# Patient Record
Sex: FEMALE | Race: WHITE
[De-identification: names, ages, dates, MRNs, and addresses within clinical notes are randomized per-mention and may not be internally consistent; named-entity substitution may affect disease eponyms.]

---

## 2018-11-30 ENCOUNTER — HOSPITAL ENCOUNTER (EMERGENCY)
Dept: HOSPITAL 97 - ER | Age: 38
Discharge: HOME | End: 2018-11-30
Payer: COMMERCIAL

## 2018-11-30 DIAGNOSIS — I10: ICD-10-CM

## 2018-11-30 DIAGNOSIS — S33.5XXA: Primary | ICD-10-CM

## 2018-11-30 LAB — UA COMPLETE W REFLEX CULTURE PNL UR: (no result)

## 2018-11-30 PROCEDURE — 74176 CT ABD & PELVIS W/O CONTRAST: CPT

## 2018-11-30 PROCEDURE — 81003 URINALYSIS AUTO W/O SCOPE: CPT

## 2018-11-30 PROCEDURE — 81015 MICROSCOPIC EXAM OF URINE: CPT

## 2018-11-30 PROCEDURE — 99284 EMERGENCY DEPT VISIT MOD MDM: CPT

## 2018-11-30 PROCEDURE — 76377 3D RENDER W/INTRP POSTPROCES: CPT

## 2018-11-30 PROCEDURE — 96372 THER/PROPH/DIAG INJ SC/IM: CPT

## 2018-11-30 NOTE — EDPHYS
Physician Documentation                                                                           

 [facility]                                                                

Name: Marci Reyes                                                                                 

Age: 38 yrs                                                                                       

Sex: Female                                                                                       

: 1980                                                                                   

MRN: [id number]                                                                                   

Arrival Date: 2018                                                                          

Time: 01:38                                                                                       

Account#: [id number]                                                                            

Bed 16                                                                                            

Private MD: Agus Perez                                                                        

ED Physician Clinton Tolentino                                                                       

HPI:                                                                                              

                                                                                             

03:19 This 38 yrs old  Female presents to ER via Ambulatory with complaints of Back  gs  

      Pain.                                                                                       

03:19 The patient presents with pain that is acute. The symptoms are located in the low back. gs  

      Onset: The symptoms/episode began/occurred 2 day(s) ago, and became persistent. The         

      pain radiates to the right quadriceps. Associated signs and symptoms: Pertinent             

      negatives: dysuria, incontinence, numbness, urinary retention. Modifying factors: the       

      patient symptoms are aggravated by any movement, bending. Severity of symptoms: At          

      their worst the symptoms were moderate, in the emergency department the symptoms are        

      unchanged.                                                                                  

                                                                                                  

OB/GYN:                                                                                           

01:45 Patient said her LMP was 2 years ago                                                    cc3 

                                                                                                  

Historical:                                                                                       

- Allergies:                                                                                      

:45 No Known Allergies;                                                                     cc3 

- PMHx:                                                                                           

03:21 Hypertension;                                                                           gs  

- PSHx:                                                                                           

01:45 ;                                                                              cc3 

                                                                                                  

- Immunization history:: Adult Immunizations not up to date.                                      

- Social history:: Smoking status: Patient/guardian denies using tobacco, never smoked.           

- Ebola Screening: : No symptoms or risks identified at this time.                                

                                                                                                  

                                                                                                  

ROS:                                                                                              

03:19 All other systems are negative.                                                         gs  

                                                                                                  

Exam:                                                                                             

03:19 Head/Face:  Normocephalic, atraumatic. Eyes:  Pupils equal round and reactive to light, gs  

      extra-ocular motions intact.  Lids and lashes normal.  Conjunctiva and sclera are           

      non-icteric and not injected.  Cornea within normal limits.  Periorbital areas with no      

      swelling, redness, or edema. ENT:  Nares patent. No nasal discharge, no septal              

      abnormalities noted.  Tympanic membranes are normal and external auditory canals are        

      clear.  Oropharynx with no redness, swelling, or masses, exudates, or evidence of           

      obstruction, uvula midline.  Mucous membranes moist. Neck:  Trachea midline, no             

      thyromegaly or masses palpated, and no cervical lymphadenopathy.  Supple, full range of     

      motion without nuchal rigidity, or vertebral point tenderness.  No Meningismus.             

      Chest/axilla:  Normal chest wall appearance and motion.  Nontender with no deformity.       

      No lesions are appreciated. Cardiovascular:  Regular rate and rhythm with a normal S1       

      and S2.  No gallops, murmurs, or rubs.  Normal PMI, no JVD.  No pulse deficits.             

      Respiratory:  Lungs have equal breath sounds bilaterally, clear to auscultation and         

      percussion.  No rales, rhonchi or wheezes noted.  No increased work of breathing, no        

      retractions or nasal flaring. Abdomen/GI:  Soft, non-tender, with normal bowel sounds.      

      No distension or tympany.  No guarding or rebound.  No evidence of tenderness               

      throughout. Skin:  Warm, dry with normal turgor.  Normal color with no rashes, no           

      lesions, and no evidence of cellulitis. MS/ Extremity:  Pulses equal, no cyanosis.          

      Neurovascular intact.  Full, normal range of motion. Neuro:  Awake and alert, GCS 15,       

      oriented to person, place, time, and situation.  Cranial nerves II-XII grossly intact.      

      Motor strength 5/5 in all extremities.  Sensory grossly intact.  Cerebellar exam            

      normal.  Normal gait.                                                                       

03:19 Constitutional: The patient appears alert, awake.                                           

03:19 Back: pain, that is moderate, of the  right low back.                                       

                                                                                                  

Vital Signs:                                                                                      

01:45  / 107; Pulse 94; Resp 20 S; Temp 98.7(O); Pulse Ox 98% on R/A; Weight 83.91 kg   cc3 

      (R); Height 5 ft. 1 in. (154.94 cm) (R); Pain 10/10;                                        

02:12  / 97; Pulse 93; Resp 19 S; Pulse Ox 99% on R/A;                                  cc3 

03:20  / 83; Pulse 89; Resp 18 S; Pulse Ox 98% on R/A;                                  cc3 

01:45 Body Mass Index 34.96 (83.91 kg, 154.94 cm)                                             cc3 

                                                                                                  

MDM:                                                                                              

01:55 Patient medically screened.                                                               

03:19 Differential diagnosis: Pyelonephritis ruptured disc, sprain, Ureterolithiasis. Data    gs  

      reviewed: vital signs, nurses notes. Counseling: I had a detailed discussion with the       

      patient and/or guardian regarding: the historical points, exam findings, and any            

      diagnostic results supporting the discharge/admit diagnosis, lab results, radiology         

      results, the need for outpatient follow up. Response to treatment: the patient's            

      symptoms have markedly improved after treatment, and as a result, I will discharge          

      patient.                                                                                    

                                                                                                  

                                                                                             

01:56 Order name: Urine Microscopic Only; Complete Time: 02:38                                  

                                                                                             

02:33 Order name: Urine Dipstick--Ancillary (enter results); Complete Time: 02:38             ar5 

                                                                                             

01:56 Order name: Urine Pregnancy Test (obtain specimen); Complete Time: 02:21                  

                                                                                             

01:56 Order name: CT Stone Protocol                                                             

                                                                                             

01:56 Order name: Urine Dipstick-Ancillary (obtain specimen); Complete Time: 02:21              

                                                                                                  

Administered Medications:                                                                         

02:05 Drug: Norco 10 mg-325 mg 1 tabs Route: PO;                                              cc3 

02:30 Follow up: Response: No adverse reaction; Pain is decreased                             cc3 

02:10 Drug: TORadol 30 mg Route: IM; Site: left gluteus;                                      cc3 

02:30 Follow up: Response: No adverse reaction; Pain is decreased                             cc3 

                                                                                                  

                                                                                                  

Disposition:                                                                                      

18 03:22 Discharged to Home. Impression: Sprain of ligaments of lumbar spine.               

- Condition is Stable.                                                                            

- Discharge Instructions: Back Exercises, Easy-to-Read.                                           

- Prescriptions for Prednisone 20 mg Oral Tablet - take 1 tablet by ORAL route once               

  daily for 5 days; 5 tablet. Tylenol- Codeine #4 300-60 mg Oral Tablet - take 1 tablet           

  by ORAL route every 6 hours As needed; 12 tablet.                                               

- Medication Reconciliation Form, Thank You Letter, Antibiotic Education, Prescription            

  Opioid Use form.                                                                                

- Follow up: Private Physician; When: 2 - 3 days; Reason: Re-evaluation by your                   

  physician.                                                                                      

                                                                                                  

                                                                                                  

                                                                                                  

Signatures:                                                                                       

Dispatcher [facility]                                                 

Clinton Tolentino MD MD                                                      

Melisa Hameed                             3                                                  

                                                                                                  

Corrections: (The following items were deleted from the chart)                                    

03:21 01:45 PMHx: None; cc3                                                                     

03:37 03:22 2018 03:22 Discharged to Home. Impression: Sprain of ligaments of lumbar    cc3 

      spine. Condition is Stable. Forms are Medication Reconciliation Form, Thank You Letter,     

      Antibiotic Education, Prescription Opioid Use. Follow up: Private Physician; When: 2 -      

      3 days; Reason: Re-evaluation by your physician.                                          

                                                                                                  

**************************************************************************************************

## 2018-11-30 NOTE — RAD REPORT
EXAM DESCRIPTION:  CT - Stone Protocol - 11/30/2018 5:41 am

 

CLINICAL HISTORY:  Abdominal pain. Lower abdominal pain.

 

COMPARISON:  None.

 

TECHNIQUE:  Computed axial tomography of the abdomen pelvis was obtained without oral or IV contrast.
 Lack of IV and oral contrast limits evaluation of solid organs, bowel, and vessels. Coronal reformat
ciarra images were obtained and reviewed.  Preliminary report generated by virtual radiologic a review p
rior to dictation

 

All CT scans are performed using dose optimization technique as appropriate and may include automated
 exposure control or mA/KV adjustment according to patient size.

 

FINDINGS:  A renal calculus is not seen. An ureteral calculus is not noted. A bladder calculus is not
 present.

 

The liver, spleen, pancreas and adrenals appear grossly normal

 

There is no evidence of diverticulitis. The appendix appears normal

 

A 4.5 centimeter left adnexal cystic mass without significant free-fluid.

 

An IUD is in good position

 

A left breast implant is only partially included in the field of view. It may be ruptured and should 
be correlated clinically

 

IMPRESSION:  Negative for a genitourinary calculus

 

4.5 centimeter left adnexal cystic mass without significant free-fluid. 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
radha cyst. It is recommended patient follow up ultrasound in a couple months for re-evaluation

## 2018-11-30 NOTE — ER
Nurse's Notes                                                                                     

 [facility]                                                                

Name: Marci Reyes                                                                                 

Age: 38 yrs                                                                                       

Sex: Female                                                                                       

: 1980                                                                                   

MRN: [id number]                                                                                   

Arrival Date: 2018                                                                          

Time: 01:38                                                                                       

Account#: [id number]                                                                            

Bed 16                                                                                            

Private MD: Agus Perez                                                                        

Diagnosis: Sprain of ligaments of lumbar spine                                                    

                                                                                                  

Presentation:                                                                                     

                                                                                             

01:45 Presenting complaint: Patient states: Low back pain radiating to bilateral hips since   cc3 

      yesterday. Transition of care: patient was not received from another setting of care.       

      Onset of symptoms was 2018. Risk Assessment: Do you want to hurt yourself      

      or someone else? Patient reports no desire to harm self or others. Initial Sepsis           

      Screen: Does the patient meet any 2 criteria? No. Patient's initial sepsis screen is        

      negative. Does the patient have a suspected source of infection? No. Patient's initial      

      sepsis screen is negative. Care prior to arrival: None.                                     

01:45 Method Of Arrival: Ambulatory                                                           cc3 

01:45 Acuity: GINGER 3                                                                           cc3 

                                                                                                  

Triage Assessment:                                                                                

01:45 General: Appears distressed, uncomfortable, Behavior is calm, cooperative, appropriate  cc3 

      for age. Pain: Complains of pain in low back, bilateral hips Pain currently is 10 out       

      of 10 on a pain scale. Quality of pain is described as aching. EENT: No signs and/or        

      symptoms were reported regarding the EENT system. Neuro: Level of Consciousness is          

      awake, alert, obeys commands, Oriented to person, place, time, situation, Appropriate       

      for age. Cardiovascular: Denies chest pain. Respiratory: Airway is patent Respiratory       

      effort is even, unlabored, Respiratory pattern is regular, symmetrical. GI: Abdomen is      

      round non-distended. : No signs and/or symptoms were reported regarding the               

      genitourinary system. Derm: No signs and/or symptoms reported regarding the                 

      dermatologic system. Musculoskeletal: Circulation, motion, and sensation intact. Range      

      of motion: intact in all extremities.                                                       

                                                                                                  

OB/GYN:                                                                                           

01:45 Patient said her LMP was 2 years ago                                                    cc3 

                                                                                                  

Historical:                                                                                       

- Allergies:                                                                                      

:45 No Known Allergies;                                                                     cc3 

- PMHx:                                                                                           

03:21 Hypertension;                                                                           gs  

- PSHx:                                                                                           

01:45 ;                                                                              cc3 

                                                                                                  

- Immunization history:: Adult Immunizations not up to date.                                      

- Social history:: Smoking status: Patient/guardian denies using tobacco, never smoked.           

- Ebola Screening: : No symptoms or risks identified at this time.                                

                                                                                                  

                                                                                                  

Screenin:45 Abuse screen: Denies threats or abuse. Denies injuries from another. Nutritional        cc3 

      screening: No deficits noted. Tuberculosis screening: No symptoms or risk factors           

      identified. Fall Risk Ambulatory Aid- None/Bed Rest/Nurse Assist (0 pts). Gait-             

      Normal/Bed Rest/Wheelchair (0 pts) Mental Status- Oriented to own ability (0 pts).          

                                                                                                  

Assessment:                                                                                       

01:45 General: see triage assessment.                                                         cc3 

02:42 Reassessment: Patient appears in no apparent distress at this time. Patient and/or      cc3 

      family updated on plan of care and expected duration. Pain level reassessed. Patient is     

      alert, oriented x 3, equal unlabored respirations, skin warm/dry/pink. Patient came         

      back from CT scan department.                                                               

03:30 Reassessment: Patient appears in no apparent distress at this time. Patient and/or      cc3 

      family updated on plan of care and expected duration. Pain level reassessed. Patient is     

      alert, oriented x 3, equal unlabored respirations, skin warm/dry/pink. Dr. Tolentino            

      discharged the patient home with prescription given. No IV cannula in situ. Patient         

      left ER vitally stable and ambulatory with her .                                     

                                                                                                  

Vital Signs:                                                                                      

01:45  / 107; Pulse 94; Resp 20 S; Temp 98.7(O); Pulse Ox 98% on R/A; Weight 83.91 kg   cc3 

      (R); Height 5 ft. 1 in. (154.94 cm) (R); Pain 10/10;                                        

02:12  / 97; Pulse 93; Resp 19 S; Pulse Ox 99% on R/A;                                  cc3 

03:20  / 83; Pulse 89; Resp 18 S; Pulse Ox 98% on R/A;                                  cc3 

01:45 Body Mass Index 34.96 (83.91 kg, 154.94 cm)                                             3 

                                                                                                  

ED Course:                                                                                        

01:38 Patient arrived in ED.                                                                  am2 

01:39 Agus Perez DO is Private Physician.                                                am2 

01:45 Arm band placed on right wrist.                                                         cc3 

01:45 Patient has correct armband on for positive identification. Bed in low position. Call   cc3 

      light in reach. Side rails up X 1. Pulse ox on. NIBP on.                                    

01:49 Clinton Tolentino MD is Attending Physician.                                                

01:50 Melisa Hameed is Primary Nurse.                                                      cc3 

01:55 Triage completed.                                                                       cc3 

02:49 CT Stone Protocol In Process Unspecified.                                               EDMS

03:02 CT completed. Patient tolerated procedure well. Patient moved to CT via wheelchair.       

      Patient moved back from CT.                                                                 

03:30 No provider procedures requiring assistance completed. Patient did not have IV access   cc3 

      during this emergency room visit.                                                           

                                                                                                  

Administered Medications:                                                                         

02:05 Drug: Norco 10 mg-325 mg 1 tabs Route: PO;                                              cc3 

02:30 Follow up: Response: No adverse reaction; Pain is decreased                             cc3 

02:10 Drug: TORadol 30 mg Route: IM; Site: left gluteus;                                      cc3 

02:30 Follow up: Response: No adverse reaction; Pain is decreased                             cc3 

                                                                                                  

                                                                                                  

Outcome:                                                                                          

03:22 Discharge ordered by MD.                                                                gs  

03:30 Discharged to home ambulatory, with family.                                             cc3 

03:30 Condition: stable                                                                           

03:30 Discharge instructions given to patient, family, Instructed on discharge instructions,      

      follow up and referral plans. medication usage, Demonstrated understanding of               

      instructions, follow-up care, medications, Prescriptions given X 2.                         

03:37 Patient left the ED.                                                                    cc3 

                                                                                                  

Signatures:                                                                                       

Dispatcher [employer]                           EDMS                                                 

Jairo Durand                                                                                   

Gabriela Luna                               Clinton Guerra MD MD                                                      

Melisa Hameed                             cc3                                                  

                                                                                                  

Corrections: (The following items were deleted from the chart)                                    

03:21 01:45 PMHx: None; cc3                                                                     

04:49 02:42 Reassessment: Patient came back from CT scan department. cc3                      cc3 

                                                                                                  

**************************************************************************************************

## 2024-08-04 NOTE — ER
Nurse's Notes                                                                                     

 [facility]                                                                 

Name: Marci Reyes                                                                                 

Age: 44 yrs                                                                                       

Sex: Female                                                                                       

: 1980                                                                                   

MRN: [id number]                                                                                   

Arrival Date: 2024                                                                          

Time: 10:42                                                                                       

Account#: [id number]                                                                            

Bed 12                                                                                            

Private MD:                                                                                       

Diagnosis: Dental Abscess                                                                         

                                                                                                  

Presentation:                                                                                     

                                                                                             

10:49 Chief complaint: Patient states: "I had some tooth fillings that fell out a while back  aa5 

      and never got fixed and Saturday I woke up with some swelling to my face". pt c/o left      

      cheek swelling. Coronavirus screen: At this time, the client does not indicate any          

      symptoms associated with coronavirus-19. Ebola Screen: Patient denies travel to an          

      Ebola-affected area in the 21 days before illness onset. Initial Sepsis Screen: Does        

      the patient meet any 2 criteria? No. Patient's initial sepsis screen is negative. Does      

      the patient have a suspected source of infection? No. Patient's initial sepsis screen       

      is negative. Risk Assessment: Do you want to hurt yourself or someone else? Patient         

      reports no desire to harm self or others. Onset of symptoms was 2024.                

10:49 Method Of Arrival: Ambulatory                                                           aa5 

10:49 Acuity: GINGRE 4                                                                           aa5 

                                                                                                  

OB/GYN:                                                                                           

11:31 LMP N/A - Birth control method, Not pregnant                                            ll1 

                                                                                                  

Historical:                                                                                       

- Allergies:                                                                                      

10:50 No Known Allergies;                                                                     aa5 

- PMHx:                                                                                           

10:50 Hypertension;                                                                           aa5 

- PSHx:                                                                                           

10:50 hysterectomy;  section;                                                         aa5 

                                                                                                  

- Immunization history:: Adult Immunizations unknown.                                             

- Infectious Disease History:: Denies.                                                            

- Social history:: Smoking status: Patient denies any tobacco usage or history of.                

                                                                                                  

                                                                                                  

Screenin:07 [facility] ED Fall Risk Assessment (Adult) History of falling in the last 3 months,       ll1 

      including since admission No falls in past 3 months (0 pts) Confusion or Disorientation     

      No (0 pts) Intoxicated or Sedated No (0 pts) Impaired Gait No (0 pts) Mobility Assist       

      Device Used No (0 pt) Altered Elimination No (0 pt) Score/Fall Risk Level 0 - 2 = Low       

      Risk Maintained a safe environment, Hourly rounding (assess needs \T\ fall precautionary    

      measures) done. Abuse screen: Denies threats or abuse. Nutritional screening: No            

      deficits noted. Tuberculosis screening: No symptoms or risk factors identified.             

                                                                                                  

Assessment:                                                                                       

11:06 General: Appears uncomfortable, Behavior is calm, cooperative, appropriate for age.     ll1 

      Pain: Complains of pain in L jaw/tooth pain Pain currently is 10 out of 10 on a pain        

      scale. Quality of pain is described as aching, throbbing. EENT: swelling present L side     

      of jaw. Reports pain in left cheek and left jaw.                                            

11:30 Reassessment: No changes from previously documented assessment. Patient and/or family   ll1 

      updated on plan of care and expected duration. Pain level reassessed. Patient is alert,     

      oriented x 3, equal unlabored respirations, skin warm/dry/pink.                             

                                                                                                  

Vital Signs:                                                                                      

10:49  / 100; Pulse 110; Resp 18 S; Temp 97.8(TE); Pulse Ox 98% on R/A; Weight 90.72 kg aa5 

      (R); Height 5 ft. 1 in. (R);                                                                

11:30  / 114; Pulse 97; Resp 17; Pulse Ox 98% ; Pain 7/10;                              ll1 

10:49 Body Mass Index 37.79 (90.72 kg, 154.94 cm)                                             aa5 

11:30 Pain Scale: Adult                                                                       ll1 

11:30 Dr. De La Torre informed of BP. Still cleared for discharge                                  ll1 

                                                                                                  

ED Course:                                                                                        

10:44 Patient arrived in ED.                                                                  im  

10:46 Akin De La Torre MD is Attending Physician.                                               ec2 

10:49 Arm band placed on.                                                                     aa5 

10:50 Triage completed.                                                                       aa5 

11:07 Patient has correct armband on for positive identification. Bed in low position.        ll1 

      Provided Education on: ER procedures and process. Cardiac monitoring not applicable on      

      this patient.                                                                               

11:07 No provider procedures requiring assistance completed.                                  ll1 

11:08 Celine Pal, RN is Primary Nurse.                                                     ll1 

11:32 Patient did not have IV access during this emergency room visit.                        ll1 

                                                                                                  

Administered Medications:                                                                         

11:05 Drug: Amoxicillin-Clavulanate  mg PO once Route: PO;                              ll1 

11:30 Follow up: Response: No adverse reaction                                                1 

11:05 Drug: Ketorolac IM 15 mg IM once Route: IM; Site: right deltoid;                        ll1 

11:30 Follow up: Response: No adverse reaction; Pain is decreased                             1 

11:06 Drug: HYDROcodone-acetaminophen PO 5 mg-325 mg 1 tabs PO once {Note: pain 10/10 RASS 0, 1 

      S.O. driving home.} Route: PO;                                                              

11:30 Follow up: Response: No adverse reaction; Pain is decreased; RASS: Alert and Calm (0)   [facility] 

11:30 Drug: Lidocaine Infiltration (1 %) 20 ml 20 ml Infiltration once; to bedside {Note: by  1 

      Dr. eD La Torre.} Volume: 20 ml; Route: Infiltration;                                            

11:30 Follow up: Response: No adverse reaction; Pain is decreased                             [facility] 

                                                                                                  

                                                                                                  

Medication:                                                                                       

11:07 VIS not applicable for this client.                                                     [facility] 

                                                                                                  

Outcome:                                                                                          

11:21 Discharge ordered by MD.                                                                ec2 

11:31 Discharged to home ambulatory,                                                          [facility] 

11:31 Condition: stable                                                                           

11:31 Discharge instructions given to patient, Instructed on discharge instructions, follow       

      up and referral plans. no drinking with medication, no driving heavy equipment,             

      medication usage, Demonstrated understanding of instructions, follow-up care,               

      medications, Prescriptions given X 2,                                                       

11:32 Patient left the ED.                                                                    [facility] 

                                                                                                  

Signatures:                                                                                       

Maira Garza RN RN   aa5                                                  

Celine Pal RN                       RN   1                                                  

Yuki Wiley Edwin, MD MD   ec2                                                  

                                                                                                  

Corrections: (The following items were deleted from the chart)                                    

10:51 10:50 Allergies: Aspirin; cristopher nicholas 

                                                                                                  

**************************************************************************************************

## 2024-08-04 NOTE — EDPHYS
Physician Documentation                                                                           

 [facility]                                                                 

Name: Marci Reyes                                                                                 

Age: 44 yrs                                                                                       

Sex: Female                                                                                       

: 1980                                                                                   

MRN: [id number]                                                                                   

Arrival Date: 2024                                                                          

Time: 10:42                                                                                       

Account#: [id number]                                                                            

Bed 12                                                                                            

Private MD:                                                                                       

Akin Contreras                                                                        

HPI:                                                                                              

                                                                                             

10:56 This 44 yrs old  Female presents to ER via Ambulatory with complaints of       ec2 

      Toothache, Facial Swelling.                                                                 

10:56 Patient arrives today for left upper dental pain as well as overlying facial swelling.  ec2 

      Reports no fevers or chills, no nausea or vomiting. Denies any history of p.o. intake..     

                                                                                                  

OB/GYN:                                                                                           

11:31 LMP N/A - Birth control method, Not pregnant                                            ll1 

                                                                                                  

Historical:                                                                                       

- Allergies:                                                                                      

10:50 No Known Allergies;                                                                     aa5 

- PMHx:                                                                                           

10:50 Hypertension;                                                                           aa5 

- PSHx:                                                                                           

10:50 hysterectomy;  section;                                                         aa5 

                                                                                                  

- Immunization history:: Adult Immunizations unknown.                                             

- Infectious Disease History:: Denies.                                                            

- Social history:: Smoking status: Patient denies any tobacco usage or history of.                

                                                                                                  

                                                                                                  

ROS:                                                                                              

10:56 Constitutional: as per hpi                                                              ec2 

                                                                                                  

Exam:                                                                                             

10:56 Constitutional:  GEN: NAD                                                                   

Head: atraumatic                                                                                  

Eyes: EOMI                                                                                        

Ears: External ears are normal.  ec2                                                              

      Mouth: Dental caries noted in the left upper teeth, overlying facial swelling, scant.       

      No erythema, no warmth, no fluctuance appreciated.  No submandibular swelling or            

      lymphadenopathy present.  No trismus                                                        

CV: regular rate                                                                                  

LUNGS: no respiratory                                                                             

      distress                                                                                    

ABD: non-distended                                                                                

SKIN: no evidence of rashes                                                                       

MSK: no evidence of                                                                               

      trauma                                                                                      

NEURO: moves all extremities equally                                                              

                                                                                                  

Vital Signs:                                                                                      

10:49  / 100; Pulse 110; Resp 18 S; Temp 97.8(TE); Pulse Ox 98% on R/A; Weight 90.72 kg aa5 

      (R); Height 5 ft. 1 in. (R);                                                                

11:30  / 114; Pulse 97; Resp 17; Pulse Ox 98% ; Pain 7/10;                              ll1 

10:49 Body Mass Index 37.79 (90.72 kg, 154.94 cm)                                             aa5 

11:30 Pain Scale: Adult                                                                       ll1 

11:30 Dr. De La Torre informed of BP. Still cleared for discharge                                  ll1 

                                                                                                  

Procedures:                                                                                       

11:22 Nerve block: (dental) of periapical block, Medication: Lidocaine 1% without epinephrine ec2 

      Amount: 8 mls were injected, Effect: the patient's symptoms are improved, markedly, Set     

      up for procedure. Performed by Akin De La Torre MD Patient tolerated well.                      

                                                                                                  

MDM:                                                                                              

10:47 Patient medically screened.                                                             ec2 

10:56 Data reviewed: vital signs. ED course: Patient arrives today for dental pain and facial ec2 

      swelling. Examination remarkable for well-appearing nontoxic vigorous otherwise in no       

      acute distress with slight tachycardia noted and facial findings as above. Will perform     

      dental block, instructed patient antibiotics and have patient follow-up with dentistry.     

      Presentation consistent with dental abscess.                                                

                                                                                                  

Administered Medications:                                                                         

11:05 Drug: Amoxicillin-Clavulanate  mg PO once Route: PO;                              ll1 

11:30 Follow up: Response: No adverse reaction                                                ll1 

11:05 Drug: Ketorolac IM 15 mg IM once Route: IM; Site: right deltoid;                        ll1 

11:30 Follow up: Response: No adverse reaction; Pain is decreased                             ll1 

11:06 Drug: HYDROcodone-acetaminophen PO 5 mg-325 mg 1 tabs PO once {Note: pain 10/10 RASS 0, ll1 

      S.O. driving home.} Route: PO;                                                              

11:30 Follow up: Response: No adverse reaction; Pain is decreased; RASS: Alert and Calm (0)   ll1 

11:30 Drug: Lidocaine Infiltration (1 %) 20 ml 20 ml Infiltration once; to bedside {Note: by  ll1 

      Dr. De La Torre.} Volume: 20 ml; Route: Infiltration;                                            

11:30 Follow up: Response: No adverse reaction; Pain is decreased                             ll1 

                                                                                                  

                                                                                                  

Disposition Summary:                                                                              

24 11:21                                                                                    

Discharge Ordered                                                                                 

 Notes:       Location: Home                                                                        
  ec2

      Condition: Stable                                                                       ec2 

      Diagnosis                                                                                   

        - Dental Abscess                                                                      ec2 

      Followup:                                                                               ec2 

        - With: Private Physician                                                                  

        - When:                                                                                    

        - Reason: Re-evaluation by your physician                                                  

      Discharge Instructions:                                                                     

        - Discharge Summary Sheet                                                             ec2 

      Forms:                                                                                      

        - Work release form                                                                   ll1 

        - Medication Reconciliation Form                                                      ec2 

        - Antibiotic Education                                                                ec2 

        - Prescription Opioid Use                                                             ec2 

        - Patient Portal Instructions                                                         ec2 

        - Leadership Thank You Letter                                                         ec2 

      Prescriptions:                                                                              

        - acetaminophen-codeine 300-30 mg Oral tablet                                              

            - take 1 tablet ORAL route every 4 hours; 15 tablet; Refills: 0, Product          ec2 

      Selection Permitted                                                                         

        - Augmentin 875-125 mg Oral tablet                                                         

            - take 1 tablet ORAL route every 12 hours for 7 days; 14 tablet; Refills: 0,      ec2 

      Product Selection Permitted                                                                 

Signatures:                                                                                       

Maira Garza RN                     RN   aa5                                                  

Celine Pal RN                       RN   ll1                                                  

Akin De La Torre MD MD   ec2                                                  

                                                                                                  

Corrections: (The following items were deleted from the chart)                                    

10:51 10:50 Allergies: Aspirin; aa5                                                           aa5 

                                                                                                  

**************************************************************************************************

## 2024-08-04 NOTE — XMS REPORT
Continuity of Care Document



                           Created on: 2024





REYES, MARCI REJI

External Reference #: [id number]

: 1980

Sex: Female



Demographics





                                        Address             [address] [place] [address]  [address]

 

                                        Home Phone          [phone]

 

                                        Work Phone          [phone]

 

                                        Email Address       [email or web address]

 

                                        Preferred Language  English

 

                                        Marital Status      Unknown

 

                                        Sikhism Affiliation Unknown

 

                                        Race                Unknown

 

                                        Additional Race(s)  Unavailable

 

                                        Ethnic Group        Unknown





Author





                                        Name                Unknown

 

                                        Address             [address] [facility]

[place]  [address]

 

                                        hospitals

[facility]

 

                                        Address             [address] [facility] [address]

[address]

[place]  [address]

 

                                        Phone               [phone]





Support





                          Name         Relationship Address      Phone

 

                                REYES, ELPIDIO J SP              [address] [place]  [address]                 [phone]

 

                                REYES, ELPIDIO J SP              [address] [place]  [address]                 [phone]

 

                                REYES, ELPIDIO J Personal Relationship [address] [place]  [address]                 [phone]





Care Team Providers





                                Care Team Member Name Role            Phone

 

                                Rochelle Quinn Attending Clinician Unavailable

 

                                French Andrea Attending Clinician Unavailable

 

                                Abril Wood  Attending Clinician Unavailable

 

                                Elizabet Hearn Attending Clinician UnavailFrench Carnes Admitting Clinician Unavailable

 

                                Abril Wood  Admitting Clinician Unavailable

 

                                Agus Perez Admitting Clinician Unavailable







Payers





                    Payer Name Policy Type Policy Number Effective Date Expirati

on Date Source







Allergies, Adverse Reactions, Alerts





                                                    Allergy 

Name                                    Allergy 

Type            Status          Severity        Reaction(s)     Onset 

Date                                    Inactive 

Date                                    Treating 

Clinician                 Comments                  Source

 

                                                    No Known 

Allergie

s            DA           Active       U                         2024-0

3- 

00:00:

00                                                              [facility]

 

                                                    No Known 

Allergie

s            DA           Active       U                         - 

00:00:

00                                                              [facility]

 

                                                    No Known 

Allergie

s            DA           Active       U                         - 

00:00:

00                                                              [facility]







Procedures





                          Procedure    Date / Time Performed Performing Clinicia

n Source

 

                          [id number]      2020 00:00:00 [facility]

 

                          [id number]      2020 00:00:00 [facility]







Encounters





                                                    Start 

Date/Time                               End 

Date/Time                               Encounter 

Type                                    Admission 

Type                                    Attending 

Clinicians                              Care 

Facility                                Care 

Department                              Encounter 

ID                                      Source

 

                                                    2023 

16:07:00                        Outpatient                      Rochelle Quinn             [facility]              [id number]

[id number]                                   [facility] 

- [facility]

 

                                                    2020 

07:15:00                        Inpatient       French Boyd               [facility]               [place]                  [id number]

2002                                  [facility]

l [facility]

 

                                                    2020 

13:30:00                        Inpatient       French Boyd               [facility]               [place]                  [id number]

                                  [facility]

[facility] 

[place]

 

                                                    2020 

13:07:00                        Inpatient                       Lisa Andreanna               [facility]               ROBERTO CARLOS                [id number]

2001                                  [facility]

l [facility]

 

                                                    2024 

08:28:00                                2024 

10:38:00            Inpatient           Abril Nath              [facility]               MEDI.01             [id number]

74                                      [facility] 

[facility]

[facility]

 

                                                    2023-10-23 

10:44:00                                2023-10-23 

10:44:00            Outpatient          Elizabet Alexis           [facility]               RADI                [id number]

19                                      [facility]

 

                                                    2020 

11:00:00                                2020 

11:00:00            Outpatient                              French Andrea               [facility]               RADI                [id number]

2001                                  [facility] 

[facility]

[facility]







Results





                    Test Description Test Time Test Comments Results   Result Co

mments Source







                                        

 

                                        

 

                                        





CBC W/AUTO [id number] 05:33:00* 



                      Test Item  Value      Reference Range Interpretation Comme

nts

 

                      WHITE BLOOD CELL (test code = WBC) 13.4 K/mm3 6.5-12.3   H

          

 

                      RED BLOOD CELL (test code = RBC) 3.72 M/mm3 3.51-4.69  N  

        

 

                      HEMOGLOBIN (test code = HGB) 9.9 g/dL   10.1-13.8  L      

    

 

                      HEMATOCRIT (test code = HCT) 31.5 %     32.5-41.8  L      

    

 

                      MEAN CELL VOLUME (test code = MCV) 84.7 fL    84.6-96.6  N

          

 

                      MEAN CELL HGB (test code = MCH) 26.6 pg    27.3-33.9  L   

       

 

                                                    MEAN CELL HGB CONCETRATION (

test 

code = MCHC)    31.4 gm/dL      32.0-34.2       L               

 

                                                    RED CELL DISTRIBUTION WIDTH 

(test 

code = RDW)     13.3 %          12.2-16.3       N               

 

                      PLATELET COUNT (test code = PLT) 333 K/mm3  134-363    N  

        

 

                                                    MEAN PLATELET VOLUME (test c

ode = 

MPV)            10.1 fL         9.2-12.7        N               

 

                      NEUTROPHIL % (test code = NT%) 78.2 %     57.9-77.3  H    

      

 

                      LYMPHOCYTE % (test code = LY%) 14.4 %     14.5-29.7  L    

      

 

                      MONOCYTE % (test code = MO%) 6.9 %      3.6-10.2   N      

    

 

                      EOSINOPHIL % (test code = EO%) 0.0 %      0.0-3.0    N    

      

 

                      BASOPHIL % (test code = BA%) 0.3 %      0.1-0.9    N      

    

 

                      NEUTROPHIL # (test code = NT#) 10.5 K/mm3                 

      

 

                      LYMPHOCYTE # (test code = LY#) 1.9 K/mm3                  

      

 

                      MONOCYTE # (test code = MO#) 0.9 K/mm3                    

    

 

                      EOSINOPHIL # (test code = EO#) 0 K/mm3                    

      

 

                      BASOPHIL # (test code = BA#) 0.0 K/mm3                    

    





AG HEPATITIS B SURFACE2024-03-15 17:34:00* 



                      Test Item  Value      Reference Range Interpretation Comme

nts

 

                                                    AG HEPATITIS B SURFACE (test

 code 

= HBSAG)        NONREACTIVE     NONREACTIVE                     





AB HEPATITIS C PROFILE2024-03-15 17:34:00* 



                      Test Item  Value      Reference Range Interpretation Comme

nts

 

                                                    AB HEPATITIS C (test code = 

HCVAB)          NONREACTIVE     NONREACTIVE                     

 

                                                    SIGNAL TO CUTOFF (test code 

= 

CUTOFF)         0.20            <0.80           N               





AB HIV 1 22024-03-15 17:34:00* 



                      Test Item  Value      Reference Range Interpretation Comme

nts

 

                                                    AB HIV 1 2 (test 

code = [id number]) NONREACTIVE     NONREACTIVE                     Done by [employer] 

[employer] 

4th Gen HIV Ag/Ab Combo 

Screen





BASIC METABOLIC PANEL2024-03-15 17:32:00* 



                      Test Item  Value      Reference Range Interpretation Comme

nts

 

                                                    SODIUM (test code = 

NA)             142 mEq/L       135-145         N               

 

                                                    POTASSIUM (test code 

= K)            3.7 mEq/L       3.5-5.0         N               

 

                                                    CHLORIDE (test code 

= CL)           106 mEq/L       100-115         N               

 

                                                    CARBON DIOXIDE (test 

code = CO2)     23 mEq/L        22-31           N               

 

                                                    ANION GAP (test code 

= GAP)          17.10           10-20           N               

 

                                                    GLUCOSE (test code = 

GLU)            135 mg/dL                 H               

 

                                                    BLOOD UREA NITROGEN 

(test code = BUN) 9 mg/dL         7-18            N               

 

                                                    CREATININE (test 

code = CREAT)   0.7 mg/dL       0.5-1.0         N               

 

                                                    CALCIUM (test code = 

CA)             8.6 mg/dL       8.4-10.2        N               

 

                                                    GLOMERULAR 

FILTRATION RATE 

(test code = GFR) 111 ml/min      >60             N               The Glomerular

 

Filtration Rate is a 

calculated 

parameterbased on serum 

Creatinine, patient age 

and sex. GFR valuesless 

than 60 mL/min/1.73 

square meters are 

indicative ofChronic 

Kidney Disease. Values 

less than 15 

mL/min/1.73square 

meters indicate Kidney 

failure. The 

calculation forGFR is 

based on the CKD-EPI 

(202) calculation. 

This formulais race 

indifferent and is the 

recommended formula for 

GFRby the National 

Kidney Foundation for 

Adults.The GFR will not 

calculate if the sex is 

unknown or if 

thepatient's age is <18 

years.





HCG SERUM QUAL2024-03-15 17:12:00* 



                      Test Item  Value      Reference Range Interpretation Comme

nts

 

                      HCG SERUM QUAL (test code = HCGQL) NEGATIVE               

          





COVID 19 Asymptomatic IH AG2024-03-15 16:25:00* 



                      Test Item  Value      Reference Range Interpretation Comme

nts

 

                                                    COVID 19 

Asymptomatic IH AG 

(test code = 

COVNONPUIAG)    NEGATIVE        NEGATIVE                        This test has be

en 

authorized only for the 

detection ofproteins from 

SARS-CoV-2, not for any 

other viruses 

orpathogens. Negative 

results should be treated 

as presumptive 

andconfirmed with a 

molecular assay, if 

necessary for 

patientmanagement. 

Negative results do not 

rule out COVID-19 

andshould not be used as 

the sole basis for 

treatment orpatient 

management decisions, 

including infection 

controldecisions. 

Negative results should 

be considered in 

thecontext of a patient's 

recent exposures, history 

and thepresence of 

clinical signs and 

symptoms consistent 

withCOVID-19. This test 

has not been FDA cleared 

or approved; the test 

hasbeen authorized by FDA 

under an Emergency Use 

Authorization(EUA) for 

use by laboratories 

certified under the CLIA 

thatmeet the requirements 

to perform moderate, high 

or waivedcomplexity 

tests. This test is 

authorized for use at 

thePoint of Care (POC), 

i.e., in patient care 

settingsoperating under a 

CLIA Certificate of 

Waiver, Certificate 

ofCompliance, or 

Certificate of 

Accreditation. This test 

is only authorized for 

the duration of 

thedeclaration that 

circumstances exist 

justifying 

theauthorization of 

emergency use of in vitro 

diagnostic testsfor 

detection and/or 

diagnosis of COVID-19 

under [place](b)(1) of 

the Act, 21 U.S.C. 

360bbb-3(b)(1), unless 

theauthorization is 

terminated or revoked 

sooner.





URINALYSIS COMPLETE2024-03-15 16:14:00* 



                      Test Item  Value      Reference Range Interpretation Comme

nts

 

                      UA COLOR (test code = COLU) YELLOW     YELLOW             

   

 

                                                    UA APPEARANCE (test code = 

APPU)           Slightly-Cloudy CLEAR                           

 

                                                    UA GLUCOSE DIPSTICK (test 

code = DGLUU)   NEGATIVE        NEG                             

 

                                                    UA BILIRUBIN DIPSTICK (test 

code = BILU)    NEGATIVE        NEG                             

 

                                                    UA KETONE DIPSTICK (test cod

e 

= KETU)         NEGATIVE        NEG                             

 

                                                    UA SPECIFIC GRAVITY (test 

code = SGU)     1.021           1.001-1.035     N               

 

                                                    UA BLOOD DIPSTICK (test code

 

= JANES)          NEG             NEG                             

 

                                                    UA PH DIPSTICK (test code = 

GABRIELA)            5.0             5-9                             

 

                                                    UA PROTEIN DIPSTICK (test 

code = PROU)    NEGATIVE        NEG                             

 

                                                    UA UROBILINIOGEN DIPSTICK 

(test code = URO) NEGATIVE mg/dL  NEG                             

 

                                                    UA NITRITE DIPSTICK (test 

code = MAGALY)    NEG             NEG                             

 

                                                    UA LEUKOCYTE ESTERASE 

DIPSTICK (test code = LEUU) NEG             NEG                             

 

                      UA WBC (test code = WBCU) 0-2 #/hpf  NONE SEEN            

 

 

                      UA RBC (test code = RBCU) 0-2 #/hpf  NONE SEEN            

 

 

                                                    UA EPITHELIAL CELLS (test 

code = EPIU)    RARE #/HPF      RARE-FEW                        

 

                      UA MUCUS (test code = MUCU) 2+         NONE SEEN  A       

   





URINE SAMPLE: CLEAN CATCHCBC W/AUTO DIFF2024-03-15 16:05:00* 



                      Test Item  Value      Reference Range Interpretation Comme

nts

 

                      WHITE BLOOD CELL (test code = WBC) 8.5 K/mm3  6.5-12.3   N

          

 

                      RED BLOOD CELL (test code = RBC) 4.10 M/mm3 3.51-4.69  N  

        

 

                      HEMOGLOBIN (test code = HGB) 11.2 g/dL  10.1-13.8  N      

    

 

                      HEMATOCRIT (test code = HCT) 35.3 %     32.5-41.8  N      

    

 

                      MEAN CELL VOLUME (test code = MCV) 86.1 fL    84.6-96.6  N

          

 

                      MEAN CELL HGB (test code = MCH) 27.3 pg    27.3-33.9  N   

       

 

                                                    MEAN CELL HGB CONCETRATION (

test 

code = MCHC)    31.7 gm/dL      32.0-34.2       L               

 

                                                    RED CELL DISTRIBUTION WIDTH 

(test 

code = RDW)     13.1 %          12.2-16.3       N               

 

                      PLATELET COUNT (test code = PLT) 364 K/mm3  134-363    H  

        

 

                                                    MEAN PLATELET VOLUME (test c

ode = 

MPV)            10.0 fL         9.2-12.7        N               

 

                      NEUTROPHIL % (test code = NT%) 66.1 %     57.9-77.3  N    

      

 

                      LYMPHOCYTE % (test code = LY%) 25.4 %     14.5-29.7  N    

      

 

                      MONOCYTE % (test code = MO%) 5.2 %      3.6-10.2   N      

    

 

                      EOSINOPHIL % (test code = EO%) 2.5 %      0.0-3.0    N    

      

 

                      BASOPHIL % (test code = BA%) 0.7 %      0.1-0.9    N      

    

 

                      NEUTROPHIL # (test code = NT#) 5.6 K/mm3                  

      

 

                      LYMPHOCYTE # (test code = LY#) 2.2 K/mm3                  

      

 

                      MONOCYTE # (test code = MO#) 0.4 K/mm3                    

    

 

                      EOSINOPHIL # (test code = EO#) 0.21 K/mm3                 

      

 

                      BASOPHIL # (test code = BA#) 0.1 K/mm3                    

    





- DUP AB/PEL/SC COMP2023-10-23 13:45:00
************************************************************[facility] 1980 Sex: 
F************************************************************ Name: REYES,MARCI 
[facility] : 1980 Age/S: 43 / F [id number] [facility] Unit #: 
[id number] Loc: [place] [address] Phys: Elizabet Hearn MD Acct: [id number] 
Dis Date:  Status: REG CLI PHONE #: [phone] Exam Date: 10/23/2023 1139 FAX 
#: Reason: EXCESSIVE MENSTRUATIONS  EXAMS: CPT: [id number] AB/PEL/SC COMP 
76631 CLINICAL INFORMATION: Menorrhagia. Irregular cycles. Dictation location: A
1 Comparison: No recent similar prior. Technique: Transabdominal and/or 
transvaginal study was done. Duplex color and spectral Doppler analysis of 
ovarian flow. FINDINGS: The uterus measures 12.4 x 6.2 x 7.2 cm. The endometrial
echo measured 1.3cm. The ovaries were normal in size and appearance with no 
adnexal mass or fluid identified. Normal blood flow was visualized by Doppler. 
Minimal free fluid was seen in the cul-de-sac. No significant cervical cyst 
formation. IMPRESSION: Slight thickeningof the endometrial echo could be due to 
the phase of menstruation. Otherwise no uterine or adnexal mass identified. ** 
Electronically Signed by FERNANDO Alvarado ** ** on 10/23/2023 at 1345 ** 
Reported and signed by: Christopher Alvarado M.D. CC: Agus Perez DO; Elizabet Hearn MD  Technologist: Dalila Hall Trnscb Date/Time: 10/23/2023 (1345) 
JoseAGV PAGE 1 Signed Report  Name: REYES,MARCI [facility] : 1980 
Age/S: 43 / F [address] [facility] Unit #: [id number]  Loc: [place] [address] 
Phys: Elizabet Hearn MD Acct: [id number] Dis Date: Status: REG CLI  PHONE #:
[phone] Exam Date: 10/23/2023 1139 FAX #: Reason: EXCESSIVE MENSTRUATIONS 
EXAMS:  CPT: [id number] DUP AB/PEL/SC COMP 74430 (Continued) Orig Print D/T: S: 
10/23/2023 (1348) Probe:  PAGE 2 Signed Report- US PELVIC COMPLETE2023-10-23 
13:45:00************************************************************[facility] : 1980 Sex: 
F************************************************************ Name: REYES,MARCI 
[facility]  : 1980 Age/S: 43 / F [address] [address] Unit #: 
[id number] Loc: [place] [address] Phys: Elizabet Hearn MD  Acct: [id number]
Dis Date: Status: REG CLI PHONE #: [phone] Exam Date: 10/23/2023 1139  FAX 
#: Reason: MENORRHAGIA WITH REGULAR CYCLE EXAMS: CPT: [id number] US PELVIC 
COMPLETE  [address] CLINICAL INFORMATION: Menorrhagia. Irregular cycles. Dictation 
location: A 1 Comparison: No recent similar prior.  Technique: Transabdominal 
and/or transvaginal study was done. Duplex color and spectral Doppler analysis 
of ovarian flow. FINDINGS: The uterus measures 12.4 x 6.2 x 7.2 cm. The 
endometrial echo measured 1.3cm. The ovaries were normal in size and appearance 
with no adnexal mass or fluid identified. Normal blood flow was visualized by 
Doppler. Minimal free fluid was seen in the cul-de-sac. No significant cervical 
cyst formation. IMPRESSION: Slight thickening of the endometrial echo could be 
due to the phase of menstruation. Otherwise no uterine or adnexal mass 
identified. ** Electronically Signed by FERNANDO Alvarado ** ** on 10/23/2023
at 1345 ** Reported and signed by: Christopher Alvarado M.D. CC: Agus Hearn MD  Technologist: Dalila Hall Trnscb Date/Time: 10/23/2023 
(6142) t.SDR.AGV  PAGE 1 Signed Report Name: REYES,MARCI [facility] : 
1980 Age/S: 43 / F [address] [facility] Unit #: [id number] Loc: [place]
[address] Phys: Elizabet Hearn MD  Acct: [id number] Dis Date: Status: REG CLI 
PHONE #: [phone] Exam Date: 10/23/2023 1139 FAX #: Reason: MENORRHAGIA WITH
REGULAR CYCLE EXAMS: CPT: [id number] US PELVIC COMPLETE [address]  (Continued) Orig 
Print D/T: S: 10/23/2023 (1980) Probe: PAGE 2  Signed Report- US TRANSVAGINAL 
NON OB2023-10-23 13:45:00
************************************************************[facility] : 1980 Sex: 
F************************************************************ Name: REYES,MARCI 
[facility] : 1980 Age/S: 43 / F [address] [facility] Unit #: 
[id number] Loc:  [place] [address] Phys: Elizabet Hearn MD Acct: [id number]
Dis Date: Status: REG CLI  PHONE #: [phone] Exam Date: 10/23/2023 1138 FAX 
#: Reason: MENORRHAGIA WITH REGULAR CYCLE EXAMS:  CPT: [id number] US TRANSVAGINAL
NON OB [address] CLINICAL INFORMATION: Menorrhagia. Irregular cycles. Dictation
location: A 1 Comparison: No recent similar prior. Technique: Transabdominal 
and/or transvaginal study was done. Duplex color and spectral Doppler analysis 
of ovarian flow. FINDINGS: The uterus measures 12.4 x 6.2 x 7.2 cm. The 
endometrial echo measured 1.3cm. The ovaries were normal in size and appearance 
with no adnexal mass or fluid identified. Normal blood flow was visualized by 
Doppler. Minimal free fluid was seen in the cul-de-sac. No significant cervical 
cyst formation.  IMPRESSION: Slight thickening of the endometrial echo could be 
due to the phase of menstruation. Otherwise no uterine or adnexal mass 
identified. ** Electronically Signed by FERNANDO Alvarado ** ** on 10/23/2023
at 1345 ** Reported and signed by: Christopher Alvarado M.D.  CC: Agus Perez DO; Elizabet Hearn MDTechnologist: Dalila Hall  Trnscb Date/Time: 10/23/2023
(2347) JoseAGV PAGE 1 Signed Report Name: REYES,MARCI  [facility] : 
1980 Age/S: 43 / F [id number] Unit #: [id number] Loc: [place]
[address]  Phys: Elizabet Hearn MD Acct: [id number] Dis Date: Status: REG CLI 
PHONE #: [phone] Exam Date: 10/23/2023 1131 FAX #: Reason: MENORRHAGIA WITH
REGULAR CYCLE EXAMS: CPT: [id number] [place] [address] (Continued) 
Orig Print D/T: S: 10/23/2023 (8811) Probe: [id number]  PAGE 2 Signed Report
FALLOPIAN TUBE,[id number] 15:04:00
--------------------------------------------------------------------------------

------------RUN DATE: 20  [facility] PAGE 1 RUN TIME:  
Specimen Inquiry RUN USER: INTERFACE  -------
--------------------------------------------------------------------------------

-----PATIENT: REYES,MARCI ACCT #: [id number] LOC: [facility] U #: [id number] 
AGE/SX: 39/F ROOM: [facility] RE20REG DR: French Andrea MD : [birth year] 
BED: A DIS:  STATUS: ADM IN TLOC: --------------------------------
------------------------------------------------------------ SPEC #: 
[id number] RECD:  STATUS: YASMANI CESPEDES #: [id number] STEFANIE: 20- 
SUBM DR: French Andrea MD ENTERED:  SP TYPE: FALLS OT DR: 
Bisi Ascencio MD ORDERED: LEVEL II SURGIC/2 CODES: H09019 - FALLOPIAN TUBE
COPIES TO: Bisi Ascencio MD [address] [place] [address] [phone] 
[email or web address] French Andrea MD [address] [place] 
[address] [phone] PROCEDURES: LEVEL II SURGIC (Incomplete) TISSUES: 
FALLOPIAN TUBE, NOS - BILATERAL FALLOPIAN TUBES CLINICAL HISTORY 39 year old, 
 @ 37.1 weeks, CHTN (kr) FINAL DIAGNOSIS Specimen #1 right fallopian tube, 
segmental resection: - histologic - complete lumen demonstrated Specimen #2 left
fallopian tube, brennen resection: - histologic  - complete lumen demonstrated
CPT code(s): 88302 x2 cds/wpd 20 ** CONTINUED ON NEXT PAGE ** 
--------------------------------------------------------------------------------

------------RUN DATE: 20 [facility] PAGE 2 RUN TIME:  
Specimen Inquiry RUN USER: INTERFACE 
--------------------------------------------------------------------------------

------------SPEC #: [id number] PATIENT: REYES,MARCI #[id number] 
(Continued)-------------------------------
------------------------------------------------------------- GROSS DESCRIPTION 
ANATOMIC SOURCE OF TISSUE (per Requisition): Fallopian right and left tube 
segments (two containers) Each specimen is labeled with the patient's name and 
medical record number. Specimen #1 is designated "right fallopiantube" and 
consists of a 1.0 cm in length and 0.4 cm in diameter pink-purple and hyperemic 
segment of fallopian tube. The lumen is pinpoint. Representative sections are 
submitted labeled A1. Specimen #2 is designated "left fallopian tube" and 
consists of a 0.8 cm in length and 0.6 cm in diameter pink-purple and hyperemic 
segment of fallopian tube. The lumen is pinpoint. Entirely submitted as B1. da
/denzel 20 MICROSCOPIC DESCRIPTION Cross-sections of fallopian tubes are 
histologic and demonstratecompletely transected lumens. errol/wpprincess 
20------------------------------------------------------
-------------------------------------- Signed ________________________________ 
Miky Ward 20 1504 
--------------------------------------------------------------------------------

------------  ** END OF REPORT **CBC W/AUTO [id number] 05:36:00* 



                      Test Item  Value      Reference Range Interpretation Comme

nts

 

                                                    WHITE BLOOD CELL (test 

code = WBC)     15.5 K/mm3      6.6-12.1        H               Results verified

 by 

repeat analysis

 

                                                    RED BLOOD CELL (test 

code = RBC)     4.01 M/mm3      3.45-5.01       N               

 

                                                    HEMOGLOBIN (test code = 

HGB)            11.6 g/dL       10.7-13.9       N               

 

                                                    HEMATOCRIT (test code = 

HCT)            35.9 %          32.1-42.1       N               

 

                                                    MEAN CELL VOLUME (test 

code = MCV)     90 fL           84.1-94.8       N               

 

                                                    MEAN CELL HGB (test code 

= MCH)          28.9 pg         27-35           N               

 

                                                    MEAN CELL HGB 

CONCETRATION (test code 

= MCHC)         32.3 gm/dL      32.2-34.1       N               

 

                                                    RED CELL DISTRIBUTION 

WIDTH (test code = RDW) 19.0 %          12.4-16.5       H               

 

                                                    PLATELET COUNT (test 

code = PLT)     199 K/mm3       133-385         N               

 

                                                    MEAN PLATELET VOLUME 

(test code = MPV) 11.0 fl         9.1-12.7        N               

 

                                                    NEUTROPHIL % (test code 

= NT%)          85.4 %          56.5-79.4       H               

 

                                                    LYMPHOCYTE % (test code 

= LY%)          8.2 %           14.3-34.3       L               

 

                                                    MONOCYTE % (test code = 

MO%)            5.6 %           5.1-10.4        N               

 

                                                    EOSINOPHIL % (test code 

= EO%)          0.1 %           0.1-3.0         N               

 

                                                    BASOPHIL % (test code = 

BA%)            0.2 %           0.1-1.0         N               

 

                                                    NEUTROPHIL # (test code 

= NT#)          13.2 K/mm3                                      

 

                                                    LYMPHOCYTE # (test code 

= LY#)          1.3 K/mm3                                       

 

                                                    MONOCYTE # (test code = 

MO#)            0.9 K/mm3                                       

 

                                                    EOSINOPHIL # (test code 

= EO#)          0.01 K/mm3                                      

 

                                                    BASOPHIL # (test code = 

BA#)            0.0 K/mm3                                       

 

                                                    RBC MORPHOLOGY REQUIRED 

(test code = RBCM) NORMAL          NORMAL                          

 

                                                    PLATELET MORPHOLOGY 

REQUIRED (test code = 

PLTMR)          NORMAL          NORMAL                          





AG HEPATITIS B [id number] 16:21:00* 



                      Test Item  Value      Reference Range Interpretation Comme

nts

 

                                                    AG HEPATITIS B SURFACE (test

 code 

= HBSAG)        NONREACTIVE     NONREACTIVE                     





IS CONSENT FORM SIGNED FOR HIV TESTING? YAB HEPATITIS C [id number] 
16:21:00* 



                      Test Item  Value      Reference Range Interpretation Comme

nts

 

                                                    AB HEPATITIS C (test code = 

HCVAB)          NONREACTIVE     NONREACTIVE                     

 

                                                    SIGNAL TO CUTOFF (test code 

= 

CUTOFF)         0.15            <0.80           N               





IS CONSENT FORM SIGNED FOR HIV TESTING? YAB [id number] 16:21:00* 



                      Test Item  Value      Reference Range Interpretation Comme

nts

 

                      AB TREPONEMA (test code = TREPAB) NONREACTIVE NONREACTIVE 

           





IS CONSENT FORM SIGNED FOR HIV TESTING? YAB HIV 1  16:21:00* 



                      Test Item  Value      Reference Range Interpretation Comme

nts

 

                                                    AB HIV 1 2 (test 

code = [id number]) NONREACTIVE     NONREACTIVE                     Done by [employer] 

[employer] 

4[employer] Gen HIV Ag/Ab Combo 

Screen





IS CONSENT FORM SIGNED FOR HIV TESTING? YAG HEPATITIS B [id number] 
15:52:00* 



                      Test Item  Value      Reference Range Interpretation Comme

nts

 

                                                    AG HEPATITIS B SURFACE (test

 code 

= HBSAG)        NONREACTIVE     NONREACTIVE                     





IS CONSENT FORM SIGNED FOR HIV TESTING? YAB HEPATITIS C [id number] 
15:52:00* 



                      Test Item  Value      Reference Range Interpretation Comme

nts

 

                      AB HEPATITIS C (test code = HCVAB)            NONREACTIVE 

           

 

                      SIGNAL TO CUTOFF (test code = CUTOFF)            <0.80    

             





IS CONSENT FORM SIGNED FOR HIV TESTING? CRISTIB [id number] 15:52:00* 



                      Test Item  Value      Reference Range Interpretation Comme

nts

 

                      AB TREPONEMA (test code = TREPAB) NONREACTIVE NONREACTIVE 

           





IS CONSENT FORM SIGNED FOR HIV TESTING? NOELLE HIV 1  15:52:00* 



                      Test Item  Value      Reference Range Interpretation Comme

nts

 

                      AB HIV 1 2 (test code = [id number])            NONREACTIVE   

         





IS CONSENT FORM SIGNED FOR HIV TESTING? YUR PROTEIN/CREATININE [id number] 
15:35:00* 



                      Test Item  Value      Reference Range Interpretation Comme

nts

 

                                                    UR PROTEIN RANDOM (test code

 = 

PROTU)          33.0 mg/dL                                      

 

                                                    UR CREATININE RANDOM (test 

code = CREATU)  78.8 mg/dL                                      

 

                                                    PROTEIN/CREATININE RATIO (te

st 

code = P/CRATIO) 410.0 mg/gcrea  <200            H               





URIC [id number] 15:02:00* 



                      Test Item  Value      Reference Range Interpretation Comme

nts

 

                      URIC ACID (test code = URIC) 4.7 mg/dL  2.6-6.0    N      

    





SGOT/[id number] 15:02:00* 



                      Test Item  Value      Reference Range Interpretation Comme

nts

 

                      SGOT/AST (test code = AST) 33 units/L 15-37               

  





[id number] 15:02:00* 



                      Test Item  Value      Reference Range Interpretation Comme

nts

 

                      SGPT/ALT (test code = ALT) 24 units/L 12-78      N        

  





LACTIC DEHYDROGENASE(LDH)2020 15:02:00* 



                      Test Item  Value      Reference Range Interpretation Comme

nts

 

                                                    LACTIC DEHYDROGENASE(LDH) (t

est 

code = LDH)     198 units/L               N               





URINALYSIS W/O [id number] 14:30:00* 



                      Test Item  Value      Reference Range Interpretation Comme

nts

 

                                                    UA GLUCOSE DIPSTICK (test co

de = 

DGLUU)          NEGATIVE        NEGATIVE                        

 

                                                    UA KETONE DIPSTICK (test cod

e = 

KETU)           1+              NEGATIVE                        

 

                                                    UA PROTEIN DIPSTICK (test co

de = 

PROU)           NEGATIVE        NEGATIVE                        





IS NURSE PERFORMING TEST? NCBC W/AUTO [id number] 14:28:00* 



                      Test Item  Value      Reference Range Interpretation Comme

nts

 

                      WHITE BLOOD CELL (test code = WBC) 8.1 K/mm3  6.6-12.1   N

          

 

                      RED BLOOD CELL (test code = RBC) 4.32 M/mm3 3.45-5.01  N  

        

 

                      HEMOGLOBIN (test code = HGB) 12.1 g/dL  10.7-13.9  N      

    

 

                      HEMATOCRIT (test code = HCT) 38.7 %     32.1-42.1  N      

    

 

                      MEAN CELL VOLUME (test code = MCV) 90 fL      84.1-94.8  N

          

 

                      MEAN CELL HGB (test code = MCH) 28.0 pg    27-35      N   

       

 

                                                    MEAN CELL HGB CONCETRATION (

test 

code = MCHC)    31.3 gm/dL      32.2-34.1       L               

 

                                                    RED CELL DISTRIBUTION WIDTH 

(test 

code = RDW)     19.4 %          12.4-16.5       H               

 

                      PLATELET COUNT (test code = PLT) 196 K/mm3  133-385    N  

        

 

                                                    MEAN PLATELET VOLUME (test c

ode = 

MPV)            11.5 fl         9.1-12.7        N               

 

                      NEUTROPHIL % (test code = NT%) 72.7 %     56.5-79.4  N    

      

 

                      LYMPHOCYTE % (test code = LY%) 18.1 %     14.3-34.3  N    

      

 

                      MONOCYTE % (test code = MO%) 7.1 %      5.1-10.4   N      

    

 

                      EOSINOPHIL % (test code = EO%) 1.0 %      0.1-3.0    N    

      

 

                      BASOPHIL % (test code = BA%) 0.4 %      0.1-1.0    N      

    

 

                      NEUTROPHIL # (test code = NT#) 5.9 K/mm3                  

      

 

                      LYMPHOCYTE # (test code = LY#) 1.5 K/mm3                  

      

 

                      MONOCYTE # (test code = MO#) 0.6 K/mm3                    

    

 

                      EOSINOPHIL # (test code = EO#) 0.08 K/mm3                 

      

 

                      BASOPHIL # (test code = BA#) 0.0 K/mm3                    

    

 

                                                    RBC MORPHOLOGY REQUIRED (mita

t code 

= RBCM)         NORMAL          NORMAL                          

 

                                                    PLATELET MORPHOLOGY REQUIRED

 (test 

code = PLTMR)   NORMAL          NORMAL                          





AG HEPATITIS B [id number] 15:57:00* 



                      Test Item  Value      Reference Range Interpretation Comme

nts

 

                                                    AG HEPATITIS B SURFACE (test

 code 

= HBSAG)        NONREACTIVE     NONREACTIVE                     





AB HEPATITIS C [id number] 15:57:00* 



                      Test Item  Value      Reference Range Interpretation Comme

nts

 

                                                    AB HEPATITIS C (test code = 

HCVAB)          NONREACTIVE     NONREACTIVE                     

 

                                                    SIGNAL TO CUTOFF (test code 

= 

CUTOFF)         0.15            <0.80           N               





AB [id number] 15:57:00* 



                      Test Item  Value      Reference Range Interpretation Comme

nts

 

                      AB TREPONEMA (test code = TREPAB) NONREACTIVE NONREACTIVE 

           





AG HEPATITIS B [id number] 15:22:00* 



                      Test Item  Value      Reference Range Interpretation Comme

nts

 

                                                    AG HEPATITIS B SURFACE (test

 code 

= HBSAG)        NONREACTIVE     NONREACTIVE                     





AB HEPATITIS C [id number] 15:22:00* 



                      Test Item  Value      Reference Range Interpretation Comme

nts

 

                      AB HEPATITIS C (test code = HCVAB)            NONREACTIVE 

           

 

                      SIGNAL TO CUTOFF (test code = CUTOFF)            <0.80    

             





AB [id number] 15:22:00* 



                      Test Item  Value      Reference Range Interpretation Comme

nts

 

                      AB TREPONEMA (test code = TREPAB) NONREACTIVE NONREACTIVE 

           





BLOOD UREA [id number] 14:50:00* 



                      Test Item  Value      Reference Range Interpretation Comme

nts

 

                      BLOOD UREA NITROGEN (test code = BUN) 9 mg/dL    7-18     

  N          





[id number] 14:50:00* 



                      Test Item  Value      Reference Range Interpretation Comme

nts

 

                      CREATININE (test code = CREAT) 0.6 mg/dL  0.5-1.0    N    

      





URIC [id number] 14:50:00* 



                      Test Item  Value      Reference Range Interpretation Comme

nts

 

                      URIC ACID (test code = URIC) 4.7 mg/dL  2.6-6.0    N      

    





[id number] 14:50:00* 



                      Test Item  Value      Reference Range Interpretation Comme

nts

 

                      SGOT/AST (test code = AST) 21 units/L 15-37      N        

  





[id number] 14:50:00* 



                      Test Item  Value      Reference Range Interpretation Comme

nts

 

                      SGPT/ALT (test code = ALT) 21 units/L 12-78      N        

  





LACTIC DEHYDROGENASE(LDH)2020 14:50:00* 



                      Test Item  Value      Reference Range Interpretation Comme

nts

 

                                                    LACTIC DEHYDROGENASE(LDH) (t

est 

code = LDH)     176 units/L               N               





URINALYSIS [id number] 14:45:00* 



                      Test Item  Value      Reference Range Interpretation Comme

nts

 

                      UA COLOR (test code = COLU) YELLOW     YELLOW             

   

 

                                                    UA APPEARANCE (test code = 

APPU)           Slightly-Cloudy CLEAR                           

 

                                                    UA GLUCOSE DIPSTICK (test 

code = DGLUU)   NEGATIVE        NEG                             

 

                                                    UA BILIRUBIN DIPSTICK (test 

code = BILU)    NEGATIVE        NEG                             

 

                                                    UA KETONE DIPSTICK (test cod

e 

= KETU)         2+              NEG             A               

 

                                                    UA SPECIFIC GRAVITY (test 

code = SGU)     1.021           1.001-1.035     N               

 

                                                    UA BLOOD DIPSTICK (test code

 

= JANES)          NEG             NEG                             

 

                                                    UA PH DIPSTICK (test code = 

GABRIELA)            5.0             5-9                             

 

                                                    UA PROTEIN DIPSTICK (test 

code = PROU)    NEGATIVE        NEG                             

 

                                                    UA UROBILINIOGEN DIPSTICK 

(test code = URO) NEGATIVE mg/dL  NEG                             

 

                                                    UA NITRITE DIPSTICK (test 

code = MAGALY)    POSITIVE        NEG             A               

 

                                                    UA LEUKOCYTE ESTERASE 

DIPSTICK (test code = LEUU) NEG             NEG                             

 

                      UA WBC (test code = WBCU) 6-10 #/hpf NONE SEEN  A         

 

 

                      UA RBC (test code = RBCU) 3-5 #/hpf  NONE SEEN  A         

 

 

                                                    UA EPITHELIAL CELLS (test 

code = EPIU)    FEW #/HPF       RARE-FEW                        

 

                                                    UA BACTERIA (test code = 

BACU)           MODERATE /HPF   RARE-FEW        A               

 

                      UA MUCUS (test code = MUCU) 3+         NONE SEEN          

   





URINE SAMPLE: CLEAN CATCHCBC W/AUTO [id number] 14:37:00* 



                      Test Item  Value      Reference Range Interpretation Comme

nts

 

                      WHITE BLOOD CELL (test code = WBC) 8.9 K/mm3  6.6-12.1   N

          

 

                      RED BLOOD CELL (test code = RBC) 4.22 M/mm3 3.45-5.01  N  

        

 

                      HEMOGLOBIN (test code = HGB) 11.8 g/dL  10.7-13.9  N      

    

 

                      HEMATOCRIT (test code = HCT) 37.5 %     32.1-42.1  N      

    

 

                      MEAN CELL VOLUME (test code = MCV) 89 fL      84.1-94.8  N

          

 

                      MEAN CELL HGB (test code = MCH) 28.0 pg    27-35      N   

       

 

                                                    MEAN CELL HGB CONCETRATION (

test 

code = MCHC)    31.5 gm/dL      32.2-34.1       L               

 

                                                    RED CELL DISTRIBUTION WIDTH 

(test 

code = RDW)     19.3 %          12.4-16.5       H               

 

                      PLATELET COUNT (test code = PLT) 191 K/mm3  133-385    N  

        

 

                                                    MEAN PLATELET VOLUME (test c

ode = 

MPV)            10.7 fl         9.1-12.7        N               

 

                      NEUTROPHIL % (test code = NT%) 73.5 %     56.5-79.4  N    

      

 

                      LYMPHOCYTE % (test code = LY%) 18.9 %     14.3-34.3  N    

      

 

                      MONOCYTE % (test code = MO%) 5.6 %      5.1-10.4   N      

    

 

                      EOSINOPHIL % (test code = EO%) 0.9 %      0.1-3.0    N    

      

 

                      BASOPHIL % (test code = BA%) 0.3 %      0.1-1.0    N      

    

 

                      NEUTROPHIL # (test code = NT#) 6.5 K/mm3                  

      

 

                      LYMPHOCYTE # (test code = LY#) 1.7 K/mm3                  

      

 

                      MONOCYTE # (test code = MO#) 0.5 K/mm3                    

    

 

                      EOSINOPHIL # (test code = EO#) 0.08 K/mm3                 

      

 

                      BASOPHIL # (test code = BA#) 0.0 K/mm3                    

    

 

                                                    RBC MORPHOLOGY REQUIRED (mita

t code 

= RBCM)         NORMAL          NORMAL                          

 

                                                    PLATELET MORPHOLOGY REQUIRED

 (test 

code = PLTMR)   NORMAL          NORMAL                          





- US PREGNANCY [facility] [id number] 12:29:00Patient Name: REYES,MARCI Unit No: 
[id number] EXAMS: CPT CODE: [id number] [place] [address] [facility] [address] OBSTETRICAL ULTRASOUND REPORT 
---------------------------------------------------------------------- Pat. 
Name: REYES, MARCI Pat. No: [id number] Study Date: 2020 11:28am , Age: 
1980, 39 Pregnancies:  4, Para 3 LMP: 2019 GA by LMP: 34w2d 
GA by 1st: 34w2d GA by US: 35w2d GA Selected: 34w2d (LMP) FREDRICK: 2020 Ref
erring MD: French Andrea Sonographer: Aleida Mendoza RDMS, RVT CPT4: USPREGFU
Admitting MD: French Andrea Hist/Ind: SCAN 3 F/U GROWTH HTN 
---------------------------------------------------------------------- 
MEASUREMENTS FETAL AGE FETAL GROWTH EVALUATION Measurement GA Range Srce %for GA
Ratios ----------- ----- ------------- ---- ------- ------------------------- 
BPD 8.9 cm 36w4d (85l0z-26n0y) Hadl BPD 83% FL/BPD 0.73 (0.71 - 0.87) HC 32.4 cm
36w1d (21z3v-34k0w) Hadl HC 82% FL/AC 0.19 (0.20 - 0.24* APD 11.2 cm APD HC/AC 
0.94 (0.94 - 1.13* TAD 10.8 cm TAD CI 0.81 (0.70 - 0.86) AC 34.6 cm 38w5d 
(68e6i-12c0j) Hadl AC >95 FL 6.5 cm 33w2d (48n0h-49i2h) Hadl FL 34% HL 6.0 cm 
34w5d (66t3v-67q0n) Logan HL 58% GA for sonogram 35w2d (66e4k-96b3n) Fetal Weight
Estimate: based on (BPD,HC,AC,FL) Hadlock Weight: 3045 gm (5211-7502) Hadlo : 
6lbs, 11oz Normal: 2283 gm (2118-9991) Brenn  Wt% &gt;90 for 34.3 wks Cervical 
Length: 4.8 cm Fetal Heart Rate: 147 bpm Amniotic Fluid Index: 08.9cm (08.0-
24.8) Q1: 1.7cm Q2: 4.2cm Q3: 0.0cm Q4: 3.0cm 
---------------------------------------------------------------------- MATERNAL 
ANATOMY ---------------------------------------------------------------------- 
Ovaries LxHxW (cm) Right 3.7 x 1.9 x 2.0 Vol: 7.4cc Left 3.7 x 2.1 x 2.1 Vol: 
8.5cc ---------------------------------------------------------------------- 
---------------------------------------------------------------------- CLINICAL 
SUMMARY  Type of Gestation: Juárez Intrauterine pregnancy in vertex 
presentation. Fetal size is large for gestational age. [facility] NAME: REYES,MARCI  Radiology Department PHYS: French Garcia MD
[address] : 1980 AGE: 39 SEX: F [place], [place] [address]  ACCT NO: 
[id number] LOC: NATALIERAD PHONE #: [phone] EXAM DATE: 2020 STATUS: REG 
CLI FAX #: [phone] RAD NO: Page 1  Signed Report (CONTINUED) Patient Name: 
REYES,MARCI Unit No: [id number] EXAMS: CPT CODE: [id number] US PREGNANCY FLW UP 
83047 (Continued) Fetal growth: Suspect LGA fetus (>90%) Fetal motion and organs
seen: Fetal heart motion seenFetal body and limb movements observed Placental 
location: Anterior Placental maturity : Grade 2 There is no evidence of placenta
previa. Amniotic fluid volume is normal. Uterus and adnexa: No significant 
abnormality is seen. Thank you for allowing us to participate in the care of 
this patient. Mack Blunt M.D. ----------------- Electronic Signature 
2020 12:29pm ** Electronically Signed byMack Blunt MD on 2020 at 
1229 ** Reported and signed by: Mack Blunt MD  CC: French Andrea MD 
Technologist: Aleida Mendoza RDMS, RVT Probe: Trnscrbd D/T: 2020 (1229) 
t.SDR.YOS Orig Print D/T: S: 2020 (1229) [facility] 
NAME: REYES,MARCI Radiology Department PHYS: French Garcia MD [address] : 1980 AGE: 39 SEX: F [place] [address] ACCT NO: [id number]
LOC: CHELITA.RAD PHONE #: [phone] EXAM DATE: 2020 STATUS: REG CLI FAX #: 
[phone] RAD NO: Page 2 Signed Report Patient Name: REYES,MARCI Unit No: 
[id number] EXAMS:  CPT CODE: [id number] US PREGNANCY FLW UP 82499 (Continued)  
[facility] NAME: REYES,MARCI Radiology Department [facility]YS: 
[facility] [address] : 1980 AGE: 39 SEX: F [place] [address] ACCT NO: [id number] LOC: CHELITA.RAD PHONE #: [phone] EXAM DATE: 
2020 STATUS: REG CLI FAX #: [phone] RAD NO: Page 3 Signed Report- US 
PREG AFTER  TRI2019-10-07 10:50:00Patient Name: REYES,MARCI Unit No: 
[id number] EXAMS: CPT CODE: [id number] US PREG AFTER  TRI [address] [facility] [address] [place], [place] [address]  OBSTETRICAL ULTRASOUND 
REPORT ---------------------------------------------------------------------- 
Pat. Name: REYES, MARCI Pat. No: [id number] Study Date: 10/07/2019 9:50am , 
Age: 1980, 39 Pregnancies:  4, Para 3 LMP: 2019 GA by LMP: 
20w1d GA by 1st: 20w1d GA by US: 21w2d GA Selected: 20w1d (LMP) FREDRICK:  2020
Referring MD: French Andrea Sonographer: Janet Gaona RDMS CPT4: 
[id number] Admitting MD: French Andrea Hist/Ind: ANATOMY SCAN 2 
---------------------------------------------------------------------- 
MEASUREMENTS FETAL AGE FETAL GROWTH EVALUATION Measurement GA Range Srce %for GA
Ratios ----------- ----- ------------- ---- ------- ------------------------- 
BPD 4.9 cm 20w6d (16x5c-17h2d) Hadl BPD 72% FL/BPD 0.65 HC 18.2 cm 20w3d (18w6d-
22w1d) Hadl HC 61% FL/AC 0.19 APD 5.4 cm  APD HC/AC1.08 (1.06 - 1.24) TAD 5.3 cm
TAD CI 0.80 (0.70 - 0.86) AC 16.8 cm 21w4d (27w6j-34z6s) Hadl AC 83% FL 3.2 cm 
19w4d (26c7x-32n7h) Hadl FL 39% HL 3.0 cm 19w6d (48w1t-21m8c) Logan HL 46% GA for
sonogram 21w2d (53x3v-42r9p) Fetal Weight Estimate: based on (BPD,AC) Hadlock 
Weight: 387 gm (331-444) Hadlock  : 0lbs, 13oz Cervical Length: 4.9 cm Fetal 
Heart Rate: 168 bpm -----------------------------------
----------------------------------- CLINICAL SUMMARY Type of Gestation: 
Juárez Intrauterine pregnancy in vertex presentation. Fetal size is 
appropriate for gestational age. Fetal motion and organsseen: Fetal heart motion
seen Fetal somatic activity observed Fetal body and limb movements seen Four 
chamber fetal heart observed Left ventricular outflow tract (LVOT) seen Right 
ventricular outflowtract (RVOT) seen Normal intracranial anatomy seen Umbilical 
cord insertion in fetus seen Fetal stomach, Renal Fossa, Bladder and Spine seen 
Three vessel umbilical cord noted Placental location: [facility] NAME: REYES,MARCI Radiology Department PHYS: French Garcia MD 
[address] : 1980 AGE: 39 SEX: F [place], [place] [address] ACCT NO: 
[id number] LOC: NATALIERAD PHONE #: [phone] EXAM DATE: 10/07/2019 STATUS: REG 
CLI FAX #: [phone] RAD NO: Page 1 Signed Report (CONTINUED) Patient Name: 
REYES,MARCI  Unit No: [id number] EXAMS: CPT CODE: [id number] US PREG AFTER 1ST 
TRI 73417 (Continued)  Anterior Placental maturity : Grade 1 There is no 
evidence of placenta previa. Amniotic fluid volume is normal. Uterus and adnexa:
No significant abnormality is seen. Thank you for allowing us to participate in 
the care of this patient. Zana Guzmán M.D. ------------------ Electronic 
Signature 10/07/2019 10:50am ** Electronically Signed by Zana Guzmán MD on 
10/07/2019 at 1050 ** Reported and signed by: Zana Guzmán MD CC: French Andrea MD  Technologist: Janet Gaona RDMS Probe: Trnscrbd D/T: 10/07/2019
(1050) t.SDR.AJ13 Orig Print D/T: S: 10/07/2019 (1050) [facility] NAME: REYES,MARCI Radiology Department  [facility] French Valentine [facility]
[address] : 1980 AGE: 39 SEX: F [place] [address] ACCT NO: 
[id number] LOC: NATALIERAD  PHONE #: [phone] EXAM DATE: 10/07/2019 STATUS: REG
CLI FAX #: [phone] RAD NO: Page 2 Signed Report  Patient Name: REYES,MARCI 
Unit No: [id number] EXAMS: CPT CODE: [id number] US PREG AFTER 1ST TRI  [address] 
(Continued) [facility] NAME[facility]MARCI  [facility]
t PHYS: PAKO French Valentine MD [address] : 1980 AGE: 39 SEX: F 
[place] [address]  ACCT NO: [id number] LOC: NATALIERAD PHONE #: [phone] 
EXAM DATE: 10/07/2019 STATUS: REG CLI FAX #: [phone] RAD NO: Page 3  Signed
Report- US PREG EVAL 1ST [id number] 15:04:00Patient Name: REYES,MARCI Unit
No: [id number] EXAMS: CPT CODE: [id number] US PREG EVAL 1ST TRIMTR  [address] [facility] [address] [place], [place] [address] OBSTETRICAL ULTRASOUND REPORT
---------------------------------------------------------------------- Pat. 
Name: REYES, MARCI Pat. No: [id number] Study Date: 2019 1:53pm , Age: 
1980, 39 Pregnancies:  4, Para 3 LMP: 2019 GA by LMP: 13w1d 
GA by US: 13w2d GA Selected: 13w1d (LMP) FREDRICK: 2020 Referring MD: FRENCH ANDREA Sonographer: Aleida Mendoza RDMS, RVT CPT4: [id number] Admitting MD: 
FRENCH ANDREA/Ind: SCAN 1 VIABILITY 
---------------------------------------------------------------------- ME
ASUREMENTS FETAL AGE FETAL GROWTH EVALUATION Measurement GA Range Srce %for GA 
Ratios ----------- ----- ------------- ---- ------- ------------------------- 
CRL 7.4 cm 13w4d (91w8r-75w7y) Hadl CRL 67% FL 1.1 cm 13w1d (37i0k-67t0q) Hadl 
FL 50% GA for sonogram 13w2d (41z3p-99n2n) based on (CRL,FL) Avg Cervical 
Length: 3.7 cm Fetal Heart Rate: 162 bpm 
---------------------------------------------------------------------- MATERNAL 
ANATOMY ---------------------------------------------------------------------- 
Ovaries LxHxW (cm) Right 3.7 x 1.9 x 2.3 Vol: 8.5cc Left 4.4 x 2.4 x 2.1 Vol: 
11.6cc ---------------------------------------------------------------------- 
---------------------------------------------------------------------- CLINICAL 
SUMMARY Type of Gestation: Juárez Intrauterine pregnancy in variable 
presentation. Fetal motion and organs seen: Fetal heart motion seen Placental 
location: Forming Amniotic fluid volume is normal. Uterus and adnexa: Left 
ovarian CLC 30 x 18 x 20mm Cristine Christensen M.D. -------------------- Electronic 
Signature 2019 03:04pm [facility] NAME: REYES,MARCI 
Radiology Department PHYS: PAKO - French Andrea MD [address]  : 
1980 AGE: 39 SEX: F [place], [place] [address] ACCT NO: [id number] LOC: NATALIERAD 
PHONE #: [phone] EXAM DATE: 2019 STATUS: PRE CLI FAX #: [phone] 
RAD NO: Page 1 Signed Report (CONTINUED) Patient Name: REYES,MARCI Unit No: 
[id number] EXAMS: CPT CODE: [id number] US PREG EVAL 1ST [id number] (Continued) 
** Electronically Signed by Cristine Christensen MD on 2019 at 1504 ** Reported and
signed by: Cristine Christensen MD  CC: French Andrea MD Technologist: Aleida Mendoza 
RDMS, RVT Probe:  Trnscrbd D/T: 2019 (1504) t.SDR.NMG Orig Print D/T: S: 
2019 (1503) [facility] NAME: REYES,MARCI  Radiology 
Department [facility]YS: [facility] [address] : 1980 AGE: 
39 SEX: F [place] [address] ACCT NO: [id number] LOC: NATALIERAD PHONE #: 
[phone] EXAM DATE: 2019 STATUS: PRE CLI FAX #: [phone] RAD NO: 
Page 2 Signed Report Patient Name:REYES,MARCI Unit No: [id number] EXAMS: CPT 
CODE: [id number] US PREG EVAL 1ST TRIMTR 62994 (Continued) [facility] NAME: REYES,MARCI Radiology Department PHYS: PAKO Hoang Andrea MD [address] : 1980 AGE: 39 SEX: F [place] [address] ACCT NO: 
[id number] LOC: NATALIERAD PHONE #: [phone] EXAM DATE: 2019 STATUS: PRE 
CLI FAX #: [phone] RAD NO:  Page 3 Signed Report- US PREG UT TRANSVAGINAL
2019 15:04:00Patient Name: REYES,MARCI Unit No: [id number] EXAMS: CPT 
CODE: [id number] US PREG UT TRANSVAGINAL [address] [facility] [address] [place], [place] [address] OBSTETRICAL ULTRASOUND REPORT --------
-------------------------------------------------------------- Pat. Name: REYES,
MARCI Pat. No: [id number] Study Date: 2019 1:53pm , Age: 1980, 39
Pregnancies:  4, Para 3 LMP: 2019 GA by LMP: 13w1d GA by US: 13w2d 
GA Selected: 13w1d (LMP) FREDRICK: 2020 Referring MD: French Andrea  
Sonographer: Aleida Mendoza RDMS, RVT CPT4: [id number] Hist/Ind: SCAN 1 
VIABILITY ----------------------------------------------------------------------
MEASUREMENTS FETAL AGE FETAL GROWTH EVALUATION Measurement GA Range Srce %for GA
Ratios ----------- ----- ------------- ---- ------- ------------------------- 
CRL 7.4 cm 13w4d (63o5m-00y0x) Hadl CRL 67% FL 1.1 cm 13w1d (87t4f-95e8q) Hadl 
FL 50% GA for sonogram 13w2d (10u5e-82n2x) based on (CRL,FL) Avg Cervical 
Length: 3.7 cm Fetal Heart Rate: 162 bpm 
---------------------------------------------------------------------- MATERNAL 
ANATOMY ---------------------------------------------------------------------- 
Ovaries LxHxW (cm) Right 3.7 x 1.9 x 2.3 Vol: 8.5cc Left 4.4 x 2.4 x 2.1 Vol: 
11.6cc ---------------------------------------------------------------------- 
---------------------------------------------------------------------- CLINICAL 
SUMMARY Type of Gestation: Juárez Intrauterine pregnancy in variable 
presentation. Fetal motion and organs seen: Fetal heart motion seen Placental 
location: Forming Amniotic fluid volume is normal. Uterus and adnexa: Left 
ovarian CLC 30 x 18 x 20mm Cristine Christensen M.D. -------------------- Electronic 
Signature 2019 03:04pm [facility] NAME: REYES,MARCI 
diology Department PHYS: BREE.01 - French Andrea MD [address]  : 
1980 AGE: 39 SEX: F [place], [place] [address] ACCT NO: [id number] LOC: MARGIE 
PHONE #: [phone] EXAM DATE: 2019 STATUS: PRE CLI FAX #: [phone] 
RAD NO: Page 1 Signed Report (CONTINUED) Patient Name: REYES,MARCI Unit No: 
[id number] EXAMS: CPT CODE: [id number] [facility] TRANSVAGINAL [address] (Continued) 
** Electronically Signed by Cristine Christensen MD on 2019 at 1501 ** Reported and
signed by: Cristine Christensen MD  CC:French Andrea MD Technologist: Aleida Mendoza RDMS, RVT Probe: [id number] Trnscrbd D/T: 2019(1508) Mitesh Orig Print 
D/T: S: 2019 (1123) [facility] NAME: REYES,MARCI  
Radiology Department [facility]YS: [facility] French Andrea MD [address] : 
1980 AGE: 39 SEX:F [place] [address] ACCT NO: [id number] LOC: NATALIERAD 
PHONE #: [phone] EXAM DATE: 2019STATUS: PRE CLI FAX #: [phone] 
RAD NO: Page 2 Signed Report Patient Name: REYES,MARCI Unit No:[id number] EXAMS:
CPT CODE: [id number]  PREG UT TRANSVAGINAL [address] (Continued) [facility] NAME: REYES,MARCI Radiology Department PHYS: AMMY Callahan 
French Andrea MD [address] : 1980 AGE: 39 SEX: F [place] [address] ACCT NO: [id number] LOC: NATALIERAD PHONE #: [phone] EXAM DATE: 
2019 STATUS: PRE CLI FAX #: [phone] RAD NO: Page 3 Signed Report



Notes





                          Date/Time    Note         Provider     Source

 

                                        2024 08:51:00 [id number] [place] [address]





PATIENT NAME: REYES,MARCI KAYE ADMIT DATE: 

24

ACCOUNT NO: [id number] ROOM NO: [facility]

MEDICAL RECORD NO: [id number] AGE: 44

SEX: F



ADMITTING PHYSICIAN: Abril Wood MD

ATTENDING PHYSICIAN: Abril Wood MD





OPERATION DATE: 2024



PREOPERATIVE DIAGNOSES:

1. Menometrorrhagia.

2. Enlarged uterus.

3. Endometrial polyp.

4. Pelvic pain.



POSTOPERATIVE DIAGNOSES:

1. Menometrorrhagia.

2. Enlarged uterus.

3. Endometrial polyp.

4. Pelvic pain.

5. Endometriosis.



PROCEDURES:

1. Robotically assisted total laparoscopic 

hysterectomy, bilateral

salpingectomy.

2. Robotically assisted laparoscopic excision of 

endometriosis.

3. Robotically assisted laparoscopic lysis of 

adhesions.



SURGEON: Abril Wood MD



ASSISTANT: Mitul Esquivel



ANESTHESIA: General.



ESTIMATED BLOOD LOSS: 100 mL



INTRAVENOUS FLUIDS: 1200 mL



URINE OUTPUT: 500 mL of clear urine.



COMPLICATIONS: None.



COUNTS: Correct.



FINDINGS: A 12-week size uterus with bladder 

adherent to the mid and lower

uterine segment, evidence of bilateral tubal 

ligation, normal ovaries

bilaterally. Endometrial implants on the left 

pelvic sidewall, posterior

cul-de-sac and right pelvic sidewall, colon 

adhered to left abdominal sidewall.

Omental adhesion to the anterior abdominal wall. 

Normal-appearing upper

abdomen.



PATIENT NAME: REYES,MARCI KAYE ACCOUNT #: 

[id number]









INDICATIONS: The patient is a 43-year-old  

4, para 4, who presented with

menometrorrhagia. The patient was found on 

ultrasound to have enlarged uterus

as well as a possible endometrial polyp. The 

patient also had chronic pelvic

pain and was noted to have endometriosis. Risks, 

benefits and alternatives were

discussed with the patient. The patient agreed to 

above-named procedures.



PROCEDURE IN DETAIL: The patient was taken to the 

OR with IV in place. She was

induced under general anesthesia without 

difficulty. The patient was given

Ancef preoperatively for prophylaxis as well as 

Lovenox 40 mg subcutaneously for

DVT prophylaxis. The patient was placed in dorsal 

lithotomy position in

[facility]. She was prepped and draped in the usual 

sterile manner. Hussein

catheter was placed in bladder sterilely. 

Anterior lip of the cervix was

grasped with single tooth tenaculum. Cervix was 

gently dilated from size 9 to

size 15 using Marco Antonio dilators. Uterus was gently 

sounded to 12 cm. Stay sutures

were placed at 3 o'clock and 9 o'clock, a 12 cm 

DESTINEY uterine manipulator, 3.0

colpotomizer ring and vaginal occluder were then 

placed in the uterus, on the

cervix and in the vagina respectively. These were 

attached to stay sutures.

Vaginal occluder was filled with 60 mL of saline. 

The patient was then placed

flat. Attention was then turned to the abdomen. 

Due to prior  section

x4 with anticipation of possible intra-abdominal 

adhesions, entry was made in

Khanna's point. Approximately 3 fingerbreadths 

below the costal margin along the

left midclavicular line, 8 mm transverse incision 

was made using the AirSeal

port and 5 mm 0-degree scope direct entry into 

the abdominal cavity was

accomplished easily. CO2 gas was then used to 

insufflate the abdomen with

pressure limit set at 15 mmHg. The patient was 

then placed in Trendelenburg.

Survey of the abdomen revealed an omental 

adhesions below the umbilicus in the

midline. Uterus was noted to be enlarged. Upper 

abdomen was within normal

limits. Umbilical port site trocar was placed 

under direct visualization as

well as 2 ancillary ports, one in right lower 

quadrant, one in the left lower

quadrant, all 8 mm ports. Laparoscope was 

removed. Robot was docked.

Fenestrated bipolar instrument was placed in the 

left lower quadrant. Monopolar

instrument was placed in right lower quadrant. 

Laparoscope was placed in the

umbilical port site. Omental adhesions to 

anterior abdominal wall was carefully

lysed. At that point, colon was noted to be 

adhered to left abdominal sidewall.

These adhesions were carefully lysed with extreme 

care to avoid injuring colon.

At that point, the left adnexal area was more 

easily visualized. The patient

was pretreated with IV Benadryl. ICG dye was 

injected intravenously. Pelvis

was then thoroughly inspected and endometrial 

implants were then noted along the

left pelvic sidewall and the posterior cul-de-sac 

and along the right pelvic

sidewall. No lesions were noted anteriorly; 

however bladder was noted to be

adhered to the mid and lower uterine segment. 

Attention was first turned to the

left pelvic sidewall. Ureter was identified. 

Incision was made in the

peritoneum. Hydrodissection was performed. 

Endometrial implants were then

carefully circumscribed and excised adjacent in 

the area of the left ovarian

fossa. Another cluster of lesions were 

circumscribed and excised along the left

pelvic sidewall adjacent to the left uterosacral 

ligament. Attention was then

turned to lesions in the posterior cul-de-sac. 

Lesion in the mid posterior

cul-de-sac was carefully circumscribed and 

excised with extreme care to avoid

injuring rectum. Another cluster of lesions was 

circumscribed and excised in

the right posterior cul-de-sac. Attention was 

then turned to the right pelvic

sidewall. Again, ureter was identified. Incision 

was made in the peritoneum.

Hydrodissection was performed. The lesions along 

the right pelvic sidewall

carefully circumscribed and excised. At that 

point, no other obvious lesions

were noted. Attention was then turned to the left 

adnexal area. Left



PATIENT NAME: REYES,MARCI KAYE ACCOUNT #: 

[id number]







uteroovarian ligament was cauterized using 

bipolar instrument then divided using

monopolar scissors. Left round ligament was 

divided in similar fashion.

Bladder was then back filled. Anterior leaflet 

was incised and dense adhesions

of the bladder to the anterior uterus were 

carefully lysed with extreme care to

avoid injuring the bladder. Bladder flap was then 

developed in the midline.

Uterine vessels were then skeletonized on the 

left side by incising posterior

peritoneum. Uterine vessels were then cauterized 

at the level of the internal

cervical os. Attention was then turned to the 

right side. Right uteroovarian

ligament was cauterized using bipolar instrument 

then divided using monopolar

scissors. Right round ligament was divided in 

similar fashion.  Anterior leaf

of the peritoneum was incised and carried out 

medially to meet the other side.

Bladder was additionally dissected off lower 

uterine segment on the right side.

Uterine vessels were skeletonized on the right 

side by incising posterior

peritoneum. Uterine vessels were then cauterized 

at the level of the internal

cervical os. After assuring that the bladders 

were dissected off the lower

uterine segment, colpotomy was performed at the 

cervicovaginal junction

anteriorly from 10 o'clock to 2 o'clock. Uterine 

vessels were then again

encountered on the right side, were cauterized 

using bipolar instrument then

divided using monopolar scissors. Colpotomy was 

continued posteriorly over to

the left side. Left uterine vessels were 

cauterized using bipolar instrument

then divided using monopolar scissors. Colpotomy 

was completed. Uterus was

then brought partially into the vagina, left in 

place to maintain

pneumoperitoneum. Attention was then turned to 

the left adnexa. Left fallopian

tube was elevated and a note was noted to be 

adhered to the ovary. Adhesions

were carefully lysed. Mesosalpinx was then 

incised to excise the tube. Tube

was removed through the assistant port. Same was 

performed on the right side.

At that point, the uterus was removed through as 

0 V-Loc suture was introduced

into the pelvis. Sterile sponge and glove was 

placed in the vagina to maintain

pneumoperitoneum. Vaginal cuff was then closed in 

2 layers, first to

reapproximate the vaginal mucosa from right apex 

over to the left apex. Same

suture was used to reapproximate the endopelvic 

fascia from left apex over to

right apex. Suture was then ran back towards the 

midline. Pelvis was then

thoroughly irrigated and checked under low 

pressure. Good hemostasis was noted.

Good peristalsis was noted bilaterally. Due to 

extensive dissection, Vistaseal

was sprayed in the pelvis over the vaginal cuff 

as well as bilateral vascular

pedicles and posterior cul-de-sac. Good 

hemostasis was confirmed. All

instruments were then removed. Robot was 

undocked. Pneumoperitoneum was

evacuated. Trocars removed. Skin was closed using 

4-0 Vicryl subcuticular

stitch in all port sites and sealed with 

Dermabond. A 0.25% Marcaine was

injected in all port sites. Hussein catheter was 

removed. Speculum exam of

vagina revealed that the cuff was intact and 

hemostatic. No vaginal lacerations

were noted. The patient was removed from dorsal 

lithotomy position, awoken from

general anesthesia without difficulty. The 

patient tolerated the procedure

well.



Throughout the case, Mitul Esquivel acted as 

first assistant. He provided

protection and retraction. He performed suction 

for visualization. He

participated in the positioning of the patient as 

well as the closure of the

wounds. Without his assistance, the surgery could 

not have been performed

safely or to adequate accepted medical standards. 

Therefore his assistance was

considered medically indicated and necessary.



Dictated By: Abril Wood MD



Date Dictated: 2024 08:51:24



PATIENT NAME: REYES,MARCI KAYE ACCOUNT #: 

[id number]







Date Transcribed: 2024 09:37:34

NNT/ENRIQUE

Job #: [id number]

Receipt ID: [id number]



Authenticated and Edited by Abril Wood MD 

On 3/31/24 8:23:19 PM











Electronically Signed by Abril Wood MD on 

24 at 0824

























































































PATIENT NAME: REYES,MARCI KAYE ACCOUNT #: 

[id number]                                        [facility]

 

                                        2024 08:31:00 

[facility] ([facility])

Gynecology Post Prog Note

REPORT#:[id number] REPORT STATUS: Signed

REPORT INITIALIZATION DATE:24 TIME: 831



PATIENT: REYES,MARCI KAYE UNIT #: [id number]

ACCOUNT#: [id number] ROOM/BED: [facility]

: [birth year] AGE: 43 SEX: F ATTEND: Abril Wood MD

ADM DT: 24 AUTHOR: Abril Wood MD

REPT SERVICE DT/TIME: 24 0831

* ALL edits or amendments must be made on the 

electronic/computer document *





General

Post-op: day 1

Status post:

RATLHBS, EOE, REKHA



Subjective

Comments:

Pt doing well. Jeffry reg diet. Voiding well. Pain 

controlled.



Objective



General

VS/I O:

Last Documented:

Result Date Time

Pulse Ox 96  0446

B/P 130/83  0446

B/P Mean 98.9  0446

O2 Delivery Room air  044

Temp 98.4  0446

Pulse 98  044

Resp 17  044

O2 Flow Rate 10  1415



24 hour I O ending at 0700:

 0700  1900

Intake Total 500.00 2090.00

Output Total 400 700

Balance 100.00 1390.00



Intake, .00 1650.00

Intake, Oral 440

Output, 100

Estimated

Blood Loss

Output, Urine 400 600

Patient 99.1 kg

Weight

Weight  Standing scale

Measurement

Method



PATIENT WEIGHT:



Weight (lb): 218

Weight (oz): 7.65

Weight (kg): 99.100





Physical Exam

General appearance: alert, awake

Wound/incision:

Location:

abd

Site condition: edges approximated, incision 

intact

Abdomen: normal bowel sounds, soft, no distention



Results

Findings/Data:

Laboratory Tests



0400

Hematology

WBC (6.5 - 12.3 K/mm3) 13.4 H

RBC (3.51 - 4.69 M/mm3) 3.72

Hgb (10.1 - 13.8 g/dL) 9.9 L

Hct (32.5 - 41.8 %) 31.5 L

MCV (84.6 - 96.6 fL) 84.7

MCH (27.3 - 33.9 pg) 26.6 L

MCHC (32.0 - 34.2 gm/dL) 31.4 L

RDW (12.2 - 16.3 %) 13.3

Plt Count (134 - 363 K/mm3) 333

MPV (9.2 - 12.7 fL) 10.1

Neut % (Auto) (57.9 - 77.3 %) 78.2 H

Lymph % (Auto) (14.5 - 29.7 %) 14.4 L

Mono % (Auto) (3.6 - 10.2 %) 6.9

Eos % (Auto) (0.0 - 3.0 %) 0.0

Baso % (Auto) (0.1 - 0.9 %)  0.3

Neut # (Auto) (K/mm3) 10.5

Lymph # (Auto) (K/mm3) 1.9

Mono # (Auto) (K/mm3) 0.9

Eos # (Auto) (K/mm3) 0

Baso # (Auto) (K/mm3) 0.0









Diagnosis, Assessment Plan

Free Text A P:

Doing well. HD stable. Good U/O and GI fxn.



Plan: Cont reg diet

PO pain meds

D/C today

Precautions given





Electronically Signed by Abril Wood MD on 

24 at 0833



RPT #:[id number]

***END OF REPORT***



                                                    [facility]

 

                                        2024-03-15 16:14:00 [id number]  [place] [address]





PATIENT NAME: REYES,MARCI KAYE  ADMIT DATE:

ACCOUNT NO: [id number] ROOM NO:

MEDICAL RECORD NO: [id number] AGE: 43

SEX: F



ADMITTING PHYSICIAN:

ATTENDING PHYSICIAN: Abril Wood MD





Order:

[id number]

Test Reason : PRE- OP (2024)

Test Date/Time Stamp:

Fri Mar 15 2024 16:14:03

Blood Pressure : ***/*** mmHG

Vent. Rate : 099 BPM Atrial Rate : 099 BPM

P-R Int : 162 ms QRS Dur : 088 ms

QT Int : 354 ms P-R-T Axes : 040 -06 019 degrees

QTc Int : 454 ms



Normal sinus rhythm

Normal ECG

No previous ECGs available

Confirmed by MADDY KEITA MD ([id number]) on 

3/18/2024 6:07:33 PM



Referred By: DOES_NOT KNOW Confirmed by:MADDY KEITA MD











Electronically Signed by Maddy Keita MD on 

24 at 1803











































PATIENT NAME: REYES,MARCI KAYE  ACCOUNT #: 

[id number]                                        [facility]

 

                                        2020 12:03:00 [id number] [place] [address]





PATIENT NAME: REYES,MARCI ADMIT DATE: 20

ACCOUNT NO: [id number] ROOM NO: [facility]

MEDICAL RECORD NO: [id number] AGE: 39

SEX: F



ADMITTING PHYSICIAN: French Andrae MD

ATTENDING PHYSICIAN: French Andrea MD





ADMISSION DATE: 2020

DISCHARGE DATE: 2020



ADMITTING DIAGNOSES: G4, P3 at 37 and 1/7th 

weeks; chronic hypertension;

superimposed preeclampsia; prior low transverse 

 section x3;

multiparity, desires permanent sterilization; Rh 

negative; iron deficiency

anemia; left ovarian cyst; and urinary tract 

infection.



DISCHARGE DIAGNOSES: G4, P3 at 37 and 1/7th 

weeks; chronic hypertension;

superimposed preeclampsia; prior low transverse 

 section x3;

multiparity, desires permanent sterilization; Rh 

negative; iron deficiency

anemia; left ovarian cyst; urinary tract 

infection; repeat low transverse

 section; and bilateral tubal ligation.



DISCHARGE MEDICATIONS: Norco 5, Motrin, prenatal 

vitamins, and labetalol 100 mg

p.o. b.i.d.



DISCHARGE INSTRUCTIONS: Pelvic rest, no heavy 

lifting, wound and fever

precautions, postpartum blues and depression 

precautions, and preeclampsia

precautions. Follow up with Dr. Andrea in 6 

weeks. Blood pressure checks at

home twice a day. Call for blood pressure over 

160/100 or symptoms.



HOSPITAL COURSE: Ms. Reyes was admitted on the 

 from the clinic at

which point she was having blood pressures in the 

150s over 110 at 37 and 1/7th

weeks. She had previously been scheduled for a 

repeat  on the  and underwent her  section and tubal 

ligation at the time of

admission. Per Dr. Curry's report, there was no 

left ovarian cyst present. Her

surgery was uncomplicated. On postoperative day 

#1, she was doing well with

adequate pain control and adequate blood pressure 

control on her labetalol. Her

hemoglobin and hematocrit preoperatively was 12.1 

and 38.7 and on postoperative

day #1, was 11.6 and 35.9. On postoperative day 

#2, her diet was advanced. Her

epidural was removed. She was started on oral 

pain medication. On

postoperative day #3, she was discharged home 

with the above noted prescriptions

and precautions. She was also given Macrodantin 

100 mg b.i.d. to continue for a

total of 7 days. This was cleared with the 

pediatrician.



Dictated By: French Andrea MD



WT: DS:MEL/BREE.KAYLEN

DD: 2020 12:03:56

DT: 2020 12:15:37

Conf#: [id number]/DID#: [id number]





PATIENT NAME: REYES,MARCI ACCOUNT #: [id number]









Authenticated by French Andrea MD On 2020 

12:47:03 PM











Electronically Signed by French Andrea MD on 

20 at 1247

































































































PATIENT NAME: REYES,MARCI ACCOUNT #: [id number]                           [facility]

 

                                        2020 08:36:00 

[facility] ([facility])

OB Postpart Progr Note

REPORT#:[id number] REPORT STATUS: Signed

DATE:20 TIME: 08



PATIENT: REYES,MARCI UNIT #: [id number]

ACCOUNT#: [id number] ROOM/BED: 2036-

: 82 AGE: 39 SEX: F ATTEND: French Andrea MD

ADM DT: 20 AUTHOR: French Andrea MD



* ALL edits or amendments must be made on the 

electronic/computer document *





Subjective



Subjective

Comments:

No complaints. Wants to go home. Eating. Voiding. 

Ambulating. Good pain control.

No headaches. DW patient circ care



Objective



Nursing Documentation Review

Nursing data:

The data set between the solid lines has been 

imported from nursing

documentation. Any exceptions have been noted 

below under Provider comments.

_________________________________________________

_______________________________



Feeding preference:

_________________________________________________

_______________________________



Provider comments on imported nursing data: []

_____________________________________________





General

VS:

Vital Signs

Date Temp Pulse Resp B/P B/P Mean Pulse Ox FiO2

/- 97.7-99.1  18 113-122/67-80 

90.0



Last Documented:

Result Date Time

B/P 118/73  035

Temp 98.5  0358

Pulse 88  0358

Resp 18  0358

B/P Mean 90.0  1610

Pulse Ox 92 / 2130



Patient Weight



Weight (lb): 248

Weight (oz):

Weight (kg): 112.491





Physical Exam

Incision site: well approximated edges, staples 

intact, dry, no drainage, no

inflammation

Fundus: firm, below the umbilicus, non-tender

Lower extremities:

Edema: none





Diagnosis, Assessment Plan



Diagnosis, Assessment Plan

Comments:

POD 3 Repeat LTCS/BTL, CHTN SI PE

Doing well

Continue labetolol

Rx NOrco 5, motrin

Continue macrodantin x 5 days

FU 6 weeks

Prec given



Electronically Signed by French Andrea MD on 

20 at 0838



RPT #:[id number]

***END OF REPORT***



                                                    [facility]

 

                                        2020 09:10:00 

[facility] ([facility])

OB Postpart Progr Note

REPORT#:[id number] REPORT STATUS: Signed

DATE:20 TIME: 0910



PATIENT: REYES,MARCI UNIT #: [id number]

ACCOUNT#: [id number] ROOM/BED: 2036-A

: 82 AGE: 39 SEX: F ATTEND: French Andrea MD

ADM DT: 20 AUTHOR: French Andrea MD



* ALL edits or amendments must be made on the 

electronic/computer document *





Subjective



Subjective

Comments:

When I walked in the room, patient states "I want 

to go home. I want my baby".

DW patient baby in NICU. She does not understand 

why. She is being communicated

with by Dr. Tiara Andrea and NICU staff but does 

not understand "saturation". I

dw patient reasons, ?s answered. Baby doing well 

and likely to be transferred to

general nursery today. She will want him 

circumcised.





Objective



Nursing Documentation Review

Nursing data:

The data set between the solid lines has been 

imported from nursing

documentation. Any exceptions have been noted 

below under Provider comments.

_________________________________________________

_______________________________



Feeding preference:

_________________________________________________

_______________________________



Provider comments on imported nursing data: []

_____________________________________________





General

VS:

Vital Signs

Date Temp Pulse Resp B/P  B/P Mean Pulse Ox FiO2

/- 97.6-99.1  18-20 102-121/66-75 

85.7-90.5



Last Documented:

Result Date Time

B/P 113/72  033

Temp 98.7  0339

Pulse 92  0339

Resp  18  0339

B/P Mean 85.7 / 0339

Pulse Ox 92 / 2130



Patient Weight



Weight (lb): 248

Weight (oz):

Weight (kg): 112.491





Physical Exam

Incision site: well approximated edges, staples 

intact, dry, no drainage, no

inflammation

Lower extremities:

Edema: 1+ pitting



Result

Findings/data:

Laboratory Tests



1355

Chemistry

Uric Acid (2.6 - 6.0 mg/dL) 4.7

AST (15 - 37 units/L) 33

ALT (12 - 78 units/L) 24

Lactate Dehydrogenase (81 - 234 units/L) 198



Laboratory Tests



0437 1355

Hematology

WBC (6.6 - 12.1 K/mm3) 15.5 H 8.1

RBC (3.45 - 5.01 M/mm3) 4.01 4.32

Hgb (10.7 - 13.9 g/dL) 11.6 12.1

Hct (32.1 - 42.1 %) 35.9 38.7

MCV (84.1 - 94.8 fL) 90 90

MCH (27 - 35 pg) 28.9 28.0

MCHC (32.2 - 34.1 gm/dL) 32.3 31.3 L

RDW (12.4 - 16.5 %) 19.0 H 19.4 H

Plt Count (133 - 385 K/mm3) 199 196

MPV (9.1 - 12.7 fl)  11.0 11.5

Neut % (Auto) (56.5 - 79.4 %) 85.4 H 72.7

Lymph % (Auto) (14.3 - 34.3 %) 8.2 L 18.1

Mono % (Auto) (5.1 - 10.4 %) 5.6 7.1

Eos % (Auto) (0.1 - 3.0 %) 0.1 1.0

Baso % (Auto) (0.1 - 1.0 %) 0.2 0.4

Neut # (Auto) (K/mm3) 13.2 5.9

Lymph # (Auto) (K/mm3) 1.3 1.5

Mono # (Auto) (K/mm3) 0.9 0.6

Eos # (Auto) (K/mm3) 0.01 0.08

Baso # (Auto) (K/mm3) 0.0 0.0



Laboratory Tests



1355

Serology

Treponema pallidum Ab (NONREACTIVE) NONREACTIVE

Hep Bs Antigen (NONREACTIVE) NONREACTIVE

Hepatitis C Antibody (NONREACTIVE) NONREACTIVE

Hep C Ab Signal/Cutoff (<0.80) 0.15

HIV 1 2 Antibody (NONREACTIVE) NONREACTIVE



Laboratory Tests



1355 1355

Urines

Urine Protein (NEGATIVE) NEGATIVE

Urine Glucose (UA) (NEGATIVE)  NEGATIVE

Urine Ketones (NEGATIVE) 1+

Ur Random Creatinine (mg/dL) 78.8

U Random Total Protein (mg/dL)  33.0

Protein/Creatinin Ratio (<200 mg/gcrea) 410.0 H









Diagnosis, Assessment Plan



Diagnosis, Assessment Plan

Comments:

POD 2 Repeat LTCS/BTL for superimposed pre 

eclampsia on CHTN

Doing well

Circ on baby planned

BP stable

Likely home tomorrow

Will need rx for meds

Continue macrodantin for 7 days. Will confirm no 

bilirubin elevations with

pediatrician.



Electronically Signed by French Andrea MD on 

20 at 0914



RPT #:[id number]

***END OF REPORT***



                                                    [facility]

 

                                        2020 08:52:00 

[facility] ([facility])

OB Postpart Progr Note

REPORT#:[id number] REPORT STATUS: Signed

DATE:20 TIME: 852



PATIENT: REYES,MARCI UNIT #: [id number]

ACCOUNT#: [id number] ROOM/BED: [address]

: 82 AGE: 39 SEX: F ATTEND: French Andrea MD

ADM DT: 20 AUTHOR: Bisi Ascencio MD



* ALL edits or amendments must be made on the 

electronic/computer document *





Subjective



Subjective

EGA weeks/days: 37 weeks

Status/Day: post operative, d1

Patient reports:

Patient reports:

Yes normal lochia, Yes pain management effective, 

No no complaints



Objective



Nursing Documentation Review

Nursing Data:

The data set between the solid lines has been 

imported from nursing

documentation. Any exceptions have been noted 

below under Provider comments.

_________________________________________________

_______________________________



Feeding preference:

_________________________________________________

_______________________________



Provider comments on imported nursing data: []

_____________________________________________





General

VS:

Vital Signs:

Date Time Temp Pulse Resp B/P B/P Pulse O2 O2 

Flow FiO2

Mean Ox Delivery Rate

02/04 0334 98.9 101 20 104/64

02/04 0334 99.0 101 20 104/64 77.1

02/03  98.6 101 20 130/84

02/03 2322 98.6 101 20 130/84 99.3

02/03 2130  130/78

02/03 2130 93 14 92

02/03 5 89 16 94

02/03  94.0

02/03  92 25 128/72 93

02/03  91.0

02/03 2030 95

02/03 2030 91 16 125/68  93

02/03  92 18 94

02/03  18

02/03  89.0

02/03  90 23 129/67  94

02/03 1945 89.0

02/03 1945 83 16 123/69 94

02/03 1931 96.0

02/03 1931  125/83

02/03 1931 90 18 97

02/03 1930 95 25 94

02/03 1915 81.0

02/03 1915 86 27 118/60 95

02/03 1901 90.0

02/03 1901 98.4 90 17 131/68 99

02/03 1900 92 32  94

02/03 1845 90.0

02/03 1845 82 19 119/71 95

02/03 1830 88.0

02/03 1830 83  17 119/68 95

02/03 1815 80.0

02/03 1815 83 18 115/59 95

02/03 1807 78.0

02/03 1807 98.6 82 17 106/57 96

02/03 1539 115.0

02/03 1539 86 149/91

02/03 1456 119.0

02/03 1456 90 162/93

02/03 1439 118.0

02/03 1439 92 158/92

02/03 1424 116.0

02/03 1424  84 154/93

02/03 1420 98.6 18

02/03 1324 107.0

02/03 1324 90 140/85



Patient Weight



Weight (lb): 248

Weight (oz):

Weight (kg): 112.491





Physical Exam

Abdomen: soft

Incision site: well approximated edges, staples 

intact, dry

Uterus: involution appropriate, non-tender

Fundus: below the umbilicus, non-tender

Lochia: normal



Result

Findings/Data:

Laboratory Tests:



0437 1355 1355

Chemistry

Uric Acid (2.6 - 6.0 mg/dL) 4.7

AST (15 - 37 units/L) 33

ALT (12 - 78 units/L) 24

Lactate Dehydrogenase (81 - 234 units/L) 198

Hematology

WBC (6.6 - 12.1 K/mm3) 15.5 H 8.1

RBC (3.45 - 5.01 M/mm3) 4.01  4.32

Hgb (10.7 - 13.9 g/dL) 11.6 12.1

Hct (32.1 - 42.1 %) 35.9 38.7

MCV (84.1 - 94.8 fL) 90 90

MCH (27 - 35 pg) 28.9 28.0

MCHC (32.2 - 34.1 gm/dL) 32.3 31.3 L

RDW (12.4 - 16.5 %) 19.0 H 19.4 H

Plt Count (133 - 385 K/mm3) 199 196

MPV (9.1 - 12.7 fl) 11.0 11.5

Neut % (Auto) (56.5 - 79.4 %) 85.4 H 72.7

Lymph % (Auto) (14.3 - 34.3 %)  8.2 L 18.1

Mono % (Auto) (5.1 - 10.4 %) 5.6 7.1

Eos % (Auto) (0.1 - 3.0 %) 0.1 1.0

Baso % (Auto) (0.1 - 1.0 %) 0.2 0.4

Neut # (Auto) (K/mm3) 13.2 5.9

Lymph # (Auto) (K/mm3)  1.3 1.5

Mono # (Auto) (K/mm3) 0.9 0.6

Eos # (Auto) (K/mm3) 0.01 0.08

Baso # (Auto) (K/mm3) 0.0 0.0

Serology

Treponema pallidum Ab (NONREACTIVE) NONREACTIVE

Hep Bs Antigen (NONREACTIVE) NONREACTIVE

Hepatitis C Antibody (NONREACTIVE) NONREACTIVE

Hep C Ab Signal/Cutoff (<0.80)  0.15

HIV 1 2 Antibody (NONREACTIVE) NONREACTIVE

Urines

Urine Protein (NEGATIVE) NEGATIVE

Urine Glucose (UA) (NEGATIVE) NEGATIVE

Urine Ketones (NEGATIVE) 1+

Ur Random Creatinine (mg/dL)  78.8

U Random Total Protein (mg/dL) 33.0

Protein/Creatinin Ratio (<200 mg/gcrea) 410.0 H







Diagnosis, Assessment Plan



Diagnosis, Assessment Plan

Problem List/A P:

1. UTI (urinary tract infection) in pregnancy in 

third trimester



Assessment: nml postpartum progress, chronic 

hypertension, pre-eclampsia

Plan: routine postpartum care

Plan discussed with: patient, spouse/partner



Electronically Signed by Bisi Ascencio MD on 

20 at 0854



RPT #:[id number]

***END OF REPORT***



                                                    [facility]

 

                                        2020 18:08:00 [id number] [facility]'

[place] [address]





PATIENT NAME: REYES,MARCI ADMIT DATE: 20

ACCOUNT NO: [id number] ROOM NO: [facility]

MEDICAL RECORD NO: [id number] AGE: 39

SEX: F



ADMITTING PHYSICIAN: French Andrea MD

ATTENDING PHYSICIAN: French Andrea MD





OPERATION DATE: 2020



PREOPERATIVE DIAGNOSES: Intrauterine pregnancy at 

37-1/2 weeks with chronic

hypertension, superimposed pregnancy-induced 

hypertension with some severe

features, but no proteinuria, multiparity with 

desire for permanent

sterilization.



POSTOPERATIVE DIAGNOSES: Intrauterine pregnancy 

at 37-1/2 weeks with chronic

hypertension, superimposed pregnancy-induced 

hypertension with some severe

features, but no proteinuria, multiparity with 

desire for permanent

sterilization.



OPERATIONS AND PROCEDURES: The patient underwent 

a repeat  section,

Pfannenstiel skin incision, low cervical 

transverse uterine incision, and

bilateral tubal sterilization.



SURGEON: Bisi Ascencio MD



ASSISTANT: Aaliyah Mckeon SA



ANESTHESIA: Combined epidural spinal per Dr. Cerda.



FINDINGS: Delivered a viable vigorous male infant 

at 1719 from the vertex

position. Copious clear fluid. Weight was 3760, 

Apgars were 8 and 8 for

color.Resolution previous ovarian cyst



ESTIMATED BLOOD LOSS: 700 mL.



COMPLICATIONS: None.



DETAILS OF PROCEDURE: The patient was taken to 

the operating room where she

received her epidural anesthetic per Dr. Cerda. She was placed in a supine

position on the operating table with a wedge on 

the right hip and prepped and

draped in standard fashion for  section. 

Prepped and draped in standard

fashion for  section. A time-out 

procedure was performed. After

verification of adequate anesthesia, a 

Pfannenstiel skin incision was made 2

fingerbreadths above the symphysis pubis 

utilizing a skin knife. Incision was

taken down the fascia utilizing electrocautery 

unit with hemostasis achieved

using electrocautery unit. The fascia was scored 

in the midline. Fascial

incision was extended bilaterally in a 

curvilinear fashion utilizing the Bryant

scissors. Fascia was removed from the underlying 

rectus muscle utilizing sharp

and blunt dissection, both superiorly and 

inferiorly. Rectus muscle was divided

in the midline. Peritoneum was entered bluntly 

and the incision was extended



PATIENT NAME: REYES,MARCI ACCOUNT #: [id number]







bluntly. Bladder blade was placed. Because of the 

patient's three previous

C-sections, an incision was made relatively high 

in the lower uterine segment.

The baby was encountered in the vertex position 

in clear fluid. The uterine

incision was extended bluntly. The baby was 

delivered utilizing a single blade

of a Brown's forceps as a lever after 

artificial rupture of membranes with a

clear fluid. Baby's head was delivered 

atraumatically. Nares and mouth were

suctioned. Cord was clamped and cut and the 

infant was shown to his parents and

handed to the nurse in attendance. Cord blood was 

obtained. Stem cells were

then collected for MD Cowart. Upon completion, 

the placenta was given to MD Cowart. The placenta was then manually 

extracted from the uterus. Uterus was

exteriorized and wrapped in a moist lap. The 

endometrial cavity was swabbed

utilizing a moist lap. Bladder blade was then 

placed. Uterine incision was

closed utilizing running suture of 0 Vicryl in a 

running-locking fashion.

Multiple figure-of-eight sutures were required 

for hemostasis. The area was

inspected for hemostasis, which was noted to be 

excellent. A moist lap was

placed across the lower uterine segment and the 

right tube was grasped utilizing

a Rochester clamp. An avascular space was noted in 

the mesosalpinx. Incision was

made utilizing electrocautery. The 0 chromic was 

placed through the defect and

the tube was tied proximally and distally 

utilizing 0 chromic. Multiple ties

were made. The intervening segment of tube was 

resected and handed to the nurse

in attendance to be tagged as right tube. 

Procedure was repeated on the

opposite side. Uterus was then turned to the 

pelvic cavity, which was

irrigated and suctioned. A small amount of 

bleeding was noted from the

midpoint of the uterine incision and a 

figure-of-eight suture was placed with

excellent hemostasis. Both tubal sites were 

inspected for hemostasis, which

was found to be excellent. Peritoneum was grasped 

with 3 hemostats and closed

utilizing running suture of 2-0 Vicryl in a 

running fashion. Same suture was

used to loosely reapproximate the rectus muscle 

in the midline. Subfascial

area was irrigated, inspected, and hemostasis 

achieved using electrocautery

unit. Fascia was closed utilizing an 0 Vicryl 

suture ligature in a running

fashion. The [employer] were 

reapproximated utilizing 2-0 Vicryl

suture ligature. Skin was reapproximated 

utilizing skin staples and a dressing

was applied. Estimated blood loss was 700 mL. 

Mom, dad, and baby Eleno

tolerated the procedure well.



Dictated By: Bisi Ascencio MD



WT: OP:FDEBRA/WALE/KAYLEN

DD: 2020 18:08:36

DT: 2020 20:03:34

Conf#: [id number]/DID#: [id number]



Authenticated and Edited by Bisi Ascencio MD On 

20 8:55:41 AM











Electronically Signed by Bisi Ascencio MD on 

20 at 0858















PATIENT NAME: REYES,MARCI ACCOUNT #: [id number]                           [facility]

 

                                        2020 17:56:00 

[facility] ([facility])

OB Delivery Note

REPORT#:[id number] REPORT STATUS: Signed

DATE:20 TIME: 



PATIENT: REYES,MARCI UNIT #: [id number]

ACCOUNT#: [id number] ROOM/BED: [address]

: 82 AGE: 39 SEX: F ATTEND: French Andrea MD

ADM DT: 20 AUTHOR: Bisi Ascencio MD



* ALL edits or amendments must be made on the 

electronic/computer document *





OB Delivery



Pre-delivery

Meds prior to delivery: labetalol ancef

[place] evaluation at delivery: NRP certified 

personnel

Admission EGA (wks/days): 37 1/7 weeks

EGA at delivery (wks/days): 37 weeks



Steroids Prior to Delivery

Steroids prior to delivery: no, delivery on 

arrival



General

VS:

Last Documented:

Result Date Time

B/P Mean 115.0  1539

B/P 149/91  1539

Pulse 86  1539

Temp 98.6  1420

Resp 18  1420







Baby A Information

Baby A information

Delivery date: 20

Delivery time: 1719

Birth status: live born

Wt of baby (grams): 3760

Gender: male, [place]



 Delivery

 section

Abdominal incision: Pfannenstiel

Primary indication: previous , Chronic 

htn with superimposed

preeclampsia

Priority: indicated (add on)

Antibiotic prior to incision: 1 dose

)(SCDs applied activated: Yes

Incision: low transverse

Hemorrhage: no

Uterine scar: intact

Consent: indication discussed, questions 

answered, pt consent to op delivery

Mother's condition: mother stable

Infant's condition: infant stable in room

Additional comments:

Surgeon Silva Norton

Repeat c/s BTL



Comp none



Blood Loss/Details

Blood loss at delivery: <1000 ml

EBL (ml's): 700



Electronically Signed by Bisi Ascencio MD on 

20 at 1800



RPT #:[id number]

***END OF REPORT***



                                                    [facility]

 

                                        2020 12:43:00 

[facility] ([facility])

OB Admission / H P

REPORT#:[id number] REPORT STATUS: Signed

DATE:20 TIME: 1243



PATIENT: REYES,MARCI  UNIT #: [id number]

ACCOUNT#: [id number] ROOM/BED:

: 82 AGE: 39 SEX: F ATTEND: French Andrea MD

ADM DT: 20 AUTHOR: French Andrea MD



* ALL edits or amendments must be made on the 

electronic/computer document *





OB Admission H P Hx

Chief complaint: elevated blood pressure

HPI:

40 yo WF  @ 39  weeks who was seen in 

the office today in [place]

with worsening BP. She has a h/o Chronic HTN and 

had been on Labetolol 100 mg

BID the entire pregnancy without problems. She 

was seen recently in [facility] with

increasing BPs and later placed on modified 

bedrest at home on . Since then,

her BPs have been up to 167/109. Today in the 

office her BP is 150/90 and 152/

110. She also reports decreased FM today. There 

is no HA, visual changes, RUQ

pain. No VB/LOF.



PNC: Dr. French Andrea

1. HTN-on meds before pregnancy off and on. 

Started on Labetolol 100mg BID on  @ 10 weeks and has remained on same dose 

throughout pregnancy. Took baby ASA

until 36 weeks

2. Prior C/S x 3 and MPDPS-scheduled for repeat 

c/section and bilateral

salpingectomy.

3. RH negative-s/p Rhogam @ 29  weeks

4. Third trimester anemia-placed on iron 2x/day

5. 4 cm left ovarian cyst seen on early scan but 

adnexa not seen on f/u USG. To

check left adnexa at the time of scheduled 

surgery

6. E. coli UTI (100,000) dx'd  and has not 

been treated yet. Sensitive to

all abx tested. [place] [facility] [place] [facility]



PNLabs: GBS positive, O negative, RI, HIV 

negative, HB neg, NR, GC/CH/Trich

negative, NIPT normal boy, CF/SMA/FX negative, 

One hour @ 13 1/7 weeks 137 and @

28 weeks was 128. H/H at 28 weeks 10.0/304.



POB: T C Section, Largest was 8#15oz girl in 

2009. No complications



GYN: Regular cycles. No abnormal pap smears. No 

STDs

Pregnancy history:

: 4

Term: 3

: 0

Abortus: 0

Living children: 3

Complications (prev preg): none

Previous : low uterine trans incis

Number of prev : 3

Indication for prior : arrest 

dilatation/descent

Current pregnancy:

EDC: 20

Admission EGA (wks/days): 37 1/7 weeks

EGA based on: LMP

Conditions of pregnancy: anemia, HTN-chron 

w/pre-eclampsia, kidney/bladder

infection

Labs:

Blood type: See HPI above

Past medical history: migraines, obesity, UTI, 

Migraines; HTN-dx'd .

Previously on losartan (with infrequent use); 

Vitamin D Deficiency; Obesity

Past surgical history:  Breast Implants; 

3/5/2002 c/s; 9/15/2006 c/s; 2009 c/s; 10/2018 Hysteroscopic removal of IUD; 

 Tonsillectomy; [facility]

Social history: employed, , no alcohol 

use, no tobacco use, no drug use,

-Rick

Medications:

Home Medications:

IRON AG FUM/VIT C/FA/MV/CA THREONATE (FERREX 28) 

1 TAB PO DAILY

LABETALOL (TRANDATE) 100 MG PO BID

PNV WITH FE FUMARATE/FA (PRENATAL) 1 TAB PO DAILY





Allergies

Coded Allergies:

No Known Allergies (20)





Objective



General

VS:

249 lbs today in the office

BP in office 152/100, 150/90





Patient Weight



Weight (lb): 248

Weight (oz):

Weight (kg): 112.185420





Physical Exam

Abdomen: gravid, soft, no abnormal tenderness, no 

guarding, no rebound

tenderness

Pelvic exam:

Pelvis clinically adequate: cl/thick/high

Membranes:

Membranes: Intact

Lower extremities:

Edema: 1+ pitting



Diagnosis, Assessment Plan



Diagnosis, Assessment Plan

Problem List/A P:

1. UTI (urinary tract infection) in pregnancy in 

third trimester



Comments:

40 yo  at 37 1/7 weeks with prior c/s x 3 

and MPDPS.

CHTN with worsening BPs in the severe range.

Admit for deliery by Repeat LTCS/Bilateral 

Salpingectomy. I have dw Dr. Ascencio

Please check adnexa for left ovarian cyst seen on 

early USG (and adnexa not

visualized on fu scan).

Obesity

Having a boy and Dr. Andrea with do circ on 

Wednesday or Thursday

Pedi Dr. Tiara Andrea at [facility] then Dr. Stone

Breastfeeding planned

Continue labetolol 100 mg BID pp

Continue oral abx pp for UTI treatment

RH negative-s/p Rhogam on 

Flu shot done 11/10/19

TDAP done 19





Electronically Signed by French Andrea MD on 

20 at 1356



RPT #:[id number]

***END OF REPORT***



                                                    [facility]

 

                                        2020 16:45:00 [id number] [facility]

[facility]

[address]

[place], [place] [address]





PATIENT NAME: REYES,MARCI ADMIT DATE: 20

ACCOUNT NO: [id number] ROOM NO:

MEDICAL RECORD NO: [id number] AGE: 39

SEX: F



ADMITTING PHYSICIAN:

ATTENDING PHYSICIAN: French Andrea MD







TRIAGE EVALUATION: 2020



HISTORY OF PRESENT ILLNESS: The patient is a 

39-year-old G4, P3-0-0-3, with

last menstrual period in 2019, and estimated 

date of confinement 2020.

She presented at 35-5/7 weeks, complaining of 

increased blood pressures at work.

She states she noted that her fingers on her 

right hand had become numb and

purple.  A coworker suggested she check her blood 

pressure. She was using the

cuff in the office, not the usual cuff she uses 

at home and her pressure was

155/105. [facility]; however, all

blood pressures have been within normal limits 

and less than 140/90. Her

highest was 123/72 at arrival. Dr. Andrea gave 

orders for labs to be drawn and

asked that I see the patient in the 30-minute 

window for [facility], was notified at

1:53 and patient evaluation began at 2:14 p.m. 

She denies headache, vision

changes, right upper quadrant pain, or other 

preeclampsia symptoms. She denies

fever, chills, diarrhea, constipation or dysuria. 

She does report some nausea

earlier prior to arrival but no vomiting. She 

denies vaginal bleeding or

leakage of fluid. She reports occasional 

contractions every 1 to 2 hours and

reports good fetal movement. She does have 

chronic hypertension and has been

taking her labetalol as instructed with good 

control of her pressures on home

monitoring and in clinic previously. Her prenatal 

care has been with Dr. Andrea. Her next visit is scheduled for 

2020. Her care began at

approximately 9 weeks' gestation in this 

pregnancy. She states other than the

chronic hypertension well controlled with 

labetalol, she has had no

complications during the pregnancy and all labs 

and ultrasounds have been

normal.



PAST MEDICAL HISTORY: Chronic hypertension 

diagnosed in , controlled with

losartan prior to the pregnancy, although she did 

stop it prior to conception

and since pregnancy, she has used labetalol.



PAST SURGICAL HISTORY:  section x3. Also, 

breast augmentation in 

and tonsils in  and wisdom teeth in .



ALLERGIES: NO KNOWN DRUG ALLERGIES.



MEDICATIONS: Prenatal vitamins, baby aspirin 

daily, iron 2 tablets at one time

daily and labetalol 100 mg twice daily.



OBSTETRICAL HISTORY: In , full term  

section for failure to



progress, delivered of a female infant weighing 8 

pounds 12 ounces. In ,

full-term  section without trial of 

labor, delivered of a female infant



PATIENT NAME: REYES,MARCI ACCOUNT #: [id number]







weighing 7 pounds 15 ounces. In , full term 

 section, without trial

of labor, delivered of a female infant weighing 8 

pounds 15 ounces. The patient

denies complications in any of the pregnancies.



GYNECOLOGIC HISTORY: Menarche at age 15 with 

regular monthly cycles lasting 7

days, moderate in amount with minimal cramping. 

She denies prior history of

STDs and reports all Pap smears normal.



SOCIAL HISTORY: The patient denies tobacco or 

illicit drug use. She reports

occasional prepregnancy alcohol use. She lives 

with her spouse, a 39-year-old

male with hypertension and her 3 daughters.  She 

works as a District  for

[employer] in [place]. She states her job is 

stressful, working in the

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



FAMILY HISTORY: Parents both with hypertension. 

Maternal grandmother with

history of breast cancer and the uterine cancer. 

Maternal grandfather with

heart disease. Paternal grandmother  with 

lung cancer, she was a

smoker, she also had hypertension. Paternal 

grandfather  with unknown

type of cancer, he was also a smoker. The patient 

has 1 sister living and

healthy and her 3 daughters, the youngest with 

asthma. Otherwise, the older two

are healthy. The patient reports her 's 

uncle has Down syndrome. He

does not think his grandmother was over 35 at the 

time of delivery. Otherwise,

no mental retardation or birth defects in either 

family. The patient did have

chromosomal testing earlier in the pregnancy and 

reports it was negative.



REVIEW OF SYSTEMS: A 10-point review of systems 

is negative except as stated

above.



PHYSICAL EXAMINATION:

GENERAL: The patient is a well-nourished, 

well-developed white female, in no

acute distress, lying on triage stretcher in 

[place].

VITAL SIGNS: Height 5 feet 1 inches, weight prior 

to the pregnancy 180 pounds

and at most recent visit to clinic 240 pounds, 

blood pressure 123/72, pulse 87,

respirations 18, and temperature 99.1.

HEAD AND NECK: Within normal limits without 

lymphadenopathy or thyromegaly.

CHEST: Clear to auscultation bilaterally with 

regular rate and rhythm.

BREASTS: Deferred.

ABDOMEN: Soft, nontender, gravid with a fundal 

height of 37 cm. No pelvic exam

was performed as the patient denied contractions 

and none were seen on ____.

EXTREMITIES: Show 1+ edema to mid calf. Bilateral 

patellar reflexes 2+.

NEUROLOGIC: Nonfocal. The patient is awake, 

alert, and oriented x3.



DIAGNOSTIC DATA: NST is category 1 with baseline 

fetal heart tones in the 130s

with multiple 15 x 15 accelerations, no decels, 

and no contractions noted.



LABORATORY DATA: Labs ordered by Dr. Andrea 

included white blood cell count

of 8.9, hemoglobin 11.8, hematocrit 37.5, and 

platelets 191,000. BUN 9,

creatinine 0.6, ALT 21, AST 21, uric acid 4.7, 

and . Urinalysis with 2+

ketones, positive for nitrites, otherwise 

negative with 3-5 rbc's, 6-10 wbc's,

few epithelial cells, and moderate amount of 

bacteria consistent with urinary

tract infection. Urine culture has been sent, but 

results will not be available

for approximately 48 hours.



ASSESSMENT AND PLAN: This is a 39-year-old G4, P3 

at 35-5/7 weeks with history

of chronic hypertension, normally well controlled 

on labetalol, but with



PATIENT NAME: REYES,MARCI ACCOUNT #: [id number]







reported elevated blood pressure at work earlier 

today. On workup in [facility] [facility], all

blood pressures are normal and no evidence of 

superimposed preeclampsia on her

labs. The patient does report excess maternal 

weight gain. She is now noted

to have a urinary tract infection and ketonuria. 

She is discharged home in

stable condition with reassuring fetal status. 

She is encouraged to drink at

least 100 ounces of water daily and eat frequent 

small healthy meals. At work,

she should avoid sitting for prolonged periods of 

time and when she is sitting,

she should prop her feet and walk around the bed 

every few hours. She is given

a script for Macrobid for the urinary tract 

infection. She is to start her

script today and take one pill every 12 hours 

until finished. She is encouraged

to perform kick counts nightly. Instructions and 

handout are given. The above

information was discussed with Dr. Andrea and she 

agreed with the plan.



Dictated By: Amy Krause MD



WT: [id number]:[id number]/KAYLEN

DD: 2020 16:45:13

DT: 2020 17:32:06

Conf#: [id number]/DID#: [id number]

Authenticated and Edited by Amy Krause MD On 

20 7:58:29 AM











Electronically Signed by Amy Krause MD on 

20 at 0800





























































PATIENT NAME: REYES,MARCI ACCOUNT #: [id number]                           [facility]

## 2024-10-13 NOTE — XMS REPORT
Continuity of Care Document



                          Created on: 2024





REYES, MARCI REJI

External Reference #: [id number]

: 1980

Sex: Female



Demographics





                                        Address             [address] [place] [address]  [address]

 

                                        Home Phone          [phone]

 

                                        Work Phone          [phone]

 

                                        Email Address       [email or web address]

 

                                        Preferred Language  English

 

                                        Marital Status      Unknown

 

                                        Temple Affiliation Unknown

 

                                        Race                Unknown

 

                                        Additional Race(s)  Unavailable

 

                                        Ethnic Group        Unknown





Author





                                        Name                Unknown

 

                                        Address             [address]

[place]  [address]

 

                                        [facility]

[employer]

 

                                        Address             [address]

[place]  [address]

 

                                        Phone               [phone]





Support





                          Name         Relationship Address      Phone

 

                                REYES, ELPIDIO J SP              [address] [place]  [address]                 [phone]

 

                                REYES, ELPIDIO J SP              [address] [place]  [address]                 [phone]

 

                                REYES, ELPIDIO J Personal Relationship [address] [place]  [address]                 [phone]





Care Team Providers





                                Care Team Member Name Role            Phone

 

                                Rochelle Quinn Attending Clinician Unavailable

 

                                French Andrea Attending Clinician Unavailable

 

                                Abril Wood  Attending Clinician Unavailable

 

                                Elizabet Hearn Attending Clinician UnavailFrench Carnes Admitting Clinician Unavailable

 

                                Abril Wood  Admitting Clinician Unavailable

 

                                Agus Perez Admitting Clinician Unavailable







Payers





                    Payer Name Policy Type Policy Number Effective Date Expirati

on Date Source







Allergies, Adverse Reactions, Alerts





                                                    Allergy 

Name                                    Allergy 

Type            Status          Severity        Reaction(s)     Onset 

Date                                    Inactive 

Date                                    Treating 

Clinician                 Comments                  Source

 

                                                    No Known 

Allergie

s            DA           Active       U                         2024-0

3- 

00:00:

00                                                              [facility]

 

                                                    No Known 

Allergie

s            DA           Active       U                         - 

00:00:

00                                                              [facility]

 

                                                    No Known 

Allergie

s            DA           Active       U                         - 

00:00:

00                                                              [facility]







Procedures





                          Procedure    Date / Time Performed Performing Clinicia

n Source

 

                          [id number]      2020 00:00:00 [facility]

 

                          [id number]      2020 00:00:00 [facility]







Encounters





                                                    Start 

Date/Time                               End 

Date/Time                               Encounter 

Type                                    Admission 

Type                                    Attending 

Clinicians                              Care 

Facility                                Care 

Department                              Encounter 

ID                                      Source

 

                                                    2023 

16:07:00                        Outpatient                      Rochelle Quinn             [facility]              [id number]

[id number]                                   [facility] 

[facility] 

[facility]

 

                                                    2020 

07:15:00                        Inpatient       French Boyd               [facility]               [place]                  [id number]

2002                                  [facility]

l [facility]

 

                                                    2020 

13:30:00                        Inpatient       French Boyd               [facility]               [place]                  [id number]

                                  [facility]

[facility] 

[place]

 

                                                    2020 

13:07:00                        Inpatient                       Lisa Andreanna               [facility]               ROBERTO CARLOS                [id number]

2001                                  [facility] 

[facility]

l [facility]

 

                                                    2024 

08:28:00                                2024 

10:38:00            Inpatient           Abril Nath              [facility]               MEDI.01             [id number]

74                                      [facility] 

[facility]

[facility]

 

                                                    2023-10-23 

10:44:00                                2023-10-23 

10:44:00            Outpatient          Elizabet Alexis           [facility]               RADI                [id number]

19                                      [facility]

 

                                                    2020 

11:00:00                                2020 

11:00:00            Outpatient                              French Andrea               [facility]               RADI                [id number]

2001                                  [facility] 

[facility]

[facility]







Results





                    Test Description Test Time Test Comments Results   Result Co

mments Source







                                        

 

                                        

 

                                        





CBC W/AUTO [id number] 05:33:00* 



                      Test Item  Value      Reference Range Interpretation Comme

nts

 

                      WHITE BLOOD CELL (test code = WBC) 13.4 K/mm3 6.5-12.3   H

          

 

                      RED BLOOD CELL (test code = RBC) 3.72 M/mm3 3.51-4.69  N  

        

 

                      HEMOGLOBIN (test code = HGB) 9.9 g/dL   10.1-13.8  L      

    

 

                      HEMATOCRIT (test code = HCT) 31.5 %     32.5-41.8  L      

    

 

                      MEAN CELL VOLUME (test code = MCV) 84.7 fL    84.6-96.6  N

          

 

                      MEAN CELL HGB (test code = MCH) 26.6 pg    27.3-33.9  L   

       

 

                                                    MEAN CELL HGB CONCETRATION (

test 

code = MCHC)    31.4 gm/dL      32.0-34.2       L               

 

                                                    RED CELL DISTRIBUTION WIDTH 

(test 

code = RDW)     13.3 %          12.2-16.3       N               

 

                      PLATELET COUNT (test code = PLT) 333 K/mm3  134-363    N  

        

 

                                                    MEAN PLATELET VOLUME (test c

ode = 

MPV)            10.1 fL         9.2-12.7        N               

 

                      NEUTROPHIL % (test code = NT%) 78.2 %     57.9-77.3  H    

      

 

                      LYMPHOCYTE % (test code = LY%) 14.4 %     14.5-29.7  L    

      

 

                      MONOCYTE % (test code = MO%) 6.9 %      3.6-10.2   N      

    

 

                      EOSINOPHIL % (test code = EO%) 0.0 %      0.0-3.0    N    

      

 

                      BASOPHIL % (test code = BA%) 0.3 %      0.1-0.9    N      

    

 

                      NEUTROPHIL # (test code = NT#) 10.5 K/mm3                 

      

 

                      LYMPHOCYTE # (test code = LY#) 1.9 K/mm3                  

      

 

                      MONOCYTE # (test code = MO#) 0.9 K/mm3                    

    

 

                      EOSINOPHIL # (test code = EO#) 0 K/mm3                    

      

 

                      BASOPHIL # (test code = BA#) 0.0 K/mm3                    

    





AG HEPATITIS B SURFACE2024-03-15 17:34:00* 



                      Test Item  Value      Reference Range Interpretation Comme

nts

 

                                                    AG HEPATITIS B SURFACE (test

 code 

= HBSAG)        NONREACTIVE     NONREACTIVE                     





AB HEPATITIS C PROFILE2024-03-15 17:34:00* 



                      Test Item  Value      Reference Range Interpretation Comme

nts

 

                                                    AB HEPATITIS C (test code = 

HCVAB)          NONREACTIVE     NONREACTIVE                     

 

                                                    SIGNAL TO CUTOFF (test code 

= 

CUTOFF)         0.20            <0.80           N               





AB HIV 1 22024-03-15 17:34:00* 



                      Test Item  Value      Reference Range Interpretation Comme

nts

 

                                                    AB HIV 1 2 (test 

code = RGF95EA) NONREACTIVE     NONREACTIVE                     Done by [employer] 

[employer] 

4th Gen HIV Ag/Ab Combo 

Screen





BASIC METABOLIC PANEL2024-03-15 17:32:00* 



                      Test Item  Value      Reference Range Interpretation Comme

nts

 

                                                    SODIUM (test code = 

NA)             142 mEq/L       135-145         N               

 

                                                    POTASSIUM (test code 

= K)            3.7 mEq/L       3.5-5.0         N               

 

                                                    CHLORIDE (test code 

= CL)           106 mEq/L       100-115         N               

 

                                                    CARBON DIOXIDE (test 

code = CO2)     23 mEq/L        22-31           N               

 

                                                    ANION GAP (test code 

= GAP)          17.10           10-20           N               

 

                                                    GLUCOSE (test code = 

GLU)            135 mg/dL                 H               

 

                                                    BLOOD UREA NITROGEN 

(test code = BUN) 9 mg/dL         7-18            N               

 

                                                    CREATININE (test 

code = CREAT)   0.7 mg/dL       0.5-1.0         N               

 

                                                    CALCIUM (test code = 

CA)             8.6 mg/dL       8.4-10.2        N               

 

                                                    GLOMERULAR 

FILTRATION RATE 

(test code = GFR) 111 ml/min      >60             N               The Glomerular

 

Filtration Rate is a 

calculated 

parameterbased on serum 

Creatinine, patient age 

and sex. GFR valuesless 

than 60 mL/min/1.73 

square meters are 

indicative ofChronic 

Kidney Disease. Values 

less than 15 

mL/min/1.73square 

meters indicate Kidney 

failure. The 

calculation forGFR is 

based on the CKD-EPI 

(202) calculation. 

This formulais race 

indifferent and is the 

recommended formula for 

GFRby the National 

Kidney Foundation for 

Adults.The GFR will not 

calculate if the sex is 

unknown or if 

thepatient's age is <18 

years.





HCG SERUM QUAL2024-03-15 17:12:00* 



                      Test Item  Value      Reference Range Interpretation Comme

nts

 

                      HCG SERUM QUAL (test code = HCGQL) NEGATIVE               

          





COVID 19 Asymptomatic IH AG2024-03-15 16:25:00* 



                      Test Item  Value      Reference Range Interpretation Comme

nts

 

                                                    COVID 19 

Asymptomatic IH AG 

(test code = 

COVNONPUIAG)    NEGATIVE        NEGATIVE                        This test has be

en 

authorized only for the 

detection ofproteins from 

SARS-CoV-2, not for any 

other viruses 

orpathogens. Negative 

results should be treated 

as presumptive 

andconfirmed with a 

molecular assay, if 

necessary for 

patientmanagement. 

Negative results do not 

rule out COVID-19 

andshould not be used as 

the sole basis for 

treatment orpatient 

management decisions, 

including infection 

controldecisions. 

Negative results should 

be considered in 

thecontext of a patient's 

recent exposures, history 

and thepresence of 

clinical signs and 

symptoms consistent 

withCOVID-19. This test 

has not been FDA cleared 

or approved; the test 

hasbeen authorized by FDA 

under an Emergency Use 

Authorization(EUA) for 

use by laboratories 

certified under the CLIA 

thatmeet the requirements 

to perform moderate, high 

or waivedcomplexity 

tests. This test is 

authorized for use at 

thePoint of Care (POC), 

i.e., in patient care 

settingsoperating under a 

CLIA Certificate of 

Waiver, Certificate 

ofCompliance, or 

Certificate of 

Accreditation. This test 

is only authorized for 

the duration of 

thedeclaration that 

circumstances exist 

justifying 

theauthorization of 

emergency use of in vitro 

diagnostic testsfor 

detection and/or 

diagnosis of COVID-19 

under Uozhyvq490(b)(1) of 

the Act, 21 U.S.C. 

360bbb-3(b)(1), unless 

theauthorization is 

terminated or revoked 

sooner.





URINALYSIS COMPLETE2024-03-15 16:14:00* 



                      Test Item  Value      Reference Range Interpretation Comme

nts

 

                      UA COLOR (test code = COLU) YELLOW     YELLOW             

   

 

                                                    UA APPEARANCE (test code = 

APPU)           Slightly-Cloudy CLEAR                           

 

                                                    UA GLUCOSE DIPSTICK (test 

code = DGLUU)   NEGATIVE        NEG                             

 

                                                    UA BILIRUBIN DIPSTICK (test 

code = BILU)    NEGATIVE        NEG                             

 

                                                    UA KETONE DIPSTICK (test cod

e 

= KETU)         NEGATIVE        NEG                             

 

                                                    UA SPECIFIC GRAVITY (test 

code = SGU)     1.021           1.001-1.035     N               

 

                                                    UA BLOOD DIPSTICK (test code

 

= JANES)          NEG             NEG                             

 

                                                    UA PH DIPSTICK (test code = 

GABRIELA)            5.0             5-9                             

 

                                                    UA PROTEIN DIPSTICK (test 

code = PROU)    NEGATIVE        NEG                             

 

                                                    UA UROBILINIOGEN DIPSTICK 

(test code = URO) NEGATIVE mg/dL  NEG                             

 

                                                    UA NITRITE DIPSTICK (test 

code = MAGALY)    NEG             NEG                             

 

                                                    UA LEUKOCYTE ESTERASE 

DIPSTICK (test code = LEUU) NEG             NEG                             

 

                      UA WBC (test code = WBCU) 0-2 #/hpf  NONE SEEN            

 

 

                      UA RBC (test code = RBCU) 0-2 #/hpf  NONE SEEN            

 

 

                                                    UA EPITHELIAL CELLS (test 

code = EPIU)    RARE #/HPF      RARE-FEW                        

 

                      UA MUCUS (test code = MUCU) 2+         NONE SEEN  A       

   





URINE SAMPLE: CLEAN CATCHCBC W/AUTO DIFF2024-03-15 16:05:00* 



                      Test Item  Value      Reference Range Interpretation Comme

nts

 

                      WHITE BLOOD CELL (test code = WBC) 8.5 K/mm3  6.5-12.3   N

          

 

                      RED BLOOD CELL (test code = RBC) 4.10 M/mm3 3.51-4.69  N  

        

 

                      HEMOGLOBIN (test code = HGB) 11.2 g/dL  10.1-13.8  N      

    

 

                      HEMATOCRIT (test code = HCT) 35.3 %     32.5-41.8  N      

    

 

                      MEAN CELL VOLUME (test code = MCV) 86.1 fL    84.6-96.6  N

          

 

                      MEAN CELL HGB (test code = MCH) 27.3 pg    27.3-33.9  N   

       

 

                                                    MEAN CELL HGB CONCETRATION (

test 

code = MCHC)    31.7 gm/dL      32.0-34.2       L               

 

                                                    RED CELL DISTRIBUTION WIDTH 

(test 

code = RDW)     13.1 %          12.2-16.3       N               

 

                      PLATELET COUNT (test code = PLT) 364 K/mm3  134-363    H  

        

 

                                                    MEAN PLATELET VOLUME (test c

ode = 

MPV)            10.0 fL         9.2-12.7        N               

 

                      NEUTROPHIL % (test code = NT%) 66.1 %     57.9-77.3  N    

      

 

                      LYMPHOCYTE % (test code = LY%) 25.4 %     14.5-29.7  N    

      

 

                      MONOCYTE % (test code = MO%) 5.2 %      3.6-10.2   N      

    

 

                      EOSINOPHIL % (test code = EO%) 2.5 %      0.0-3.0    N    

      

 

                      BASOPHIL % (test code = BA%) 0.7 %      0.1-0.9    N      

    

 

                      NEUTROPHIL # (test code = NT#) 5.6 K/mm3                  

      

 

                      LYMPHOCYTE # (test code = LY#) 2.2 K/mm3                  

      

 

                      MONOCYTE # (test code = MO#) 0.4 K/mm3                    

    

 

                      EOSINOPHIL # (test code = EO#) 0.21 K/mm3                 

      

 

                      BASOPHIL # (test code = BA#) 0.1 K/mm3                    

    





- DUP AB/PEL/SC COMP2023-10-23 13:45:00
************************************************************[facility] 1980 Sex: 
F************************************************************ Name: REYES,MARCI 
[facility] : 1980 Age/S: 43 / F [address] Unit #: 
[id number] Loc: [place] [address] Phys: Elizabet Hearn MD Acct: [id number] 
Dis Date:  Status: REG CLI PHONE #: [phone] Exam Date: 10/23/2023 1139 FAX 
#: Reason: EXCESSIVE MENSTRUATIONS  EXAMS: CPT: [id number] AB/PEL/SC COMP 
80976 CLINICAL INFORMATION: Menorrhagia. Irregular cycles. Dictation location: A
1 Comparison: No recent similar prior. Technique: Transabdominal and/or 
transvaginal study was done. Duplex color and spectral Doppler analysis of 
ovarian flow. FINDINGS: The uterus measures 12.4 x 6.2 x 7.2 cm. The endometrial
echo measured 1.3cm. The ovaries were normal in size and appearance with no 
adnexal mass or fluid identified. Normal blood flow was visualized by Doppler. 
Minimal free fluid was seen in the cul-de-sac. No significant cervical cyst 
formation. IMPRESSION: Slight thickeningof the endometrial echo could be due to 
the phase of menstruation. Otherwise no uterine or adnexal mass identified. ** 
Electronically Signed by FERNANDO Alvarado ** ** on 10/23/2023 at 1345 ** 
Reported and signed by: Christopher Alvarado M.D. CC: Agus Perez DO; Elizabet Hearn MD  Technologist: Dalila Hall Trnscb Date/Time: 10/23/2023 (1345) 
JoseAGV PAGE 1 Signed Report  Name: REYES,MARCI [facility] : 1980 
Age/S: 43 / F [id number] [address] Unit #: [id number]  Loc: [place] [address] 
Phys: Elizabet Hearn MD Acct: [id number] Dis Date: Status: REG CLI  PHONE #:
[phone] Exam Date: 10/23/2023 1139 FAX #: Reason: EXCESSIVE MENSTRUATIONS 
EXAMS:  CPT: [id number] DUP AB/PEL/SC COMP 79405 (Continued) Orig Print D/T: S: 
10/23/2023 (1348) Probe:  PAGE 2 Signed Report- US PELVIC COMPLETE2023-10-23 
13:45:00************************************************************[facility] : 1980 Sex: 
F************************************************************ Name: REYES,MARCI 
[facility]  : 1980 Age/S: 43 / F [address] Unit #: 
[id number] Loc: [place] [address] Phys: Elizabet Hearn MD  Acct: [id number]
Dis Date: Status: REG CLI PHONE #: [phone] Exam Date: 10/23/2023 1139  FAX 
#: Reason: MENORRHAGIA WITH REGULAR CYCLE EXAMS: CPT: [id number] US PELVIC 
COMPLETE  [address]756 CLINICAL INFORMATION: Menorrhagia. Irregular cycles. Dictation 
location: A 1 Comparison: No recent similar prior.  Technique: Transabdominal 
and/or transvaginal study was done. Duplex color and spectral Doppler analysis 
of ovarian flow. FINDINGS: The uterus measures 12.4 x 6.2 x 7.2 cm. The 
endometrial echo measured 1.3cm. The ovaries were normal in size and appearance 
with no adnexal mass or fluid identified. Normal blood flow was visualized by 
Doppler. Minimal free fluid was seen in the cul-de-sac. No significant cervical 
cyst formation. IMPRESSION: Slight thickening of the endometrial echo could be 
due to the phase of menstruation. Otherwise no uterine or adnexal mass 
identified. ** Electronically Signed by FERNANDO Alvarado ** ** on 10/23/2023
at 1345 ** Reported and signed by: Christopher Alvarado M.D. CC: Agus Hearn MD  Technologist: Dalila Hall Trnscb Date/Time: 10/23/2023 
(1996) t.[email or web address].AGV  PAGE 1 Signed Report Name: REYES,MARCI [facility] : 
1980 Age/S: 43 / F [address] [facility] Unit #: [id number] Loc: [place]
[address] Phys: Elizabet Hearn MD  Acct: [id number] Dis Date: Status: REG CLI 
PHONE #: [phone] Exam Date: 10/23/2023 1139 FAX #: Reason: MENORRHAGIA WITH
REGULAR CYCLE EXAMS: CPT: [id number] US PELVIC COMPLETE 40990  (Continued) Orig 
Print D/T: S: 10/23/2023 (9863) Probe: PAGE 2  Signed Report- US TRANSVAGINAL 
NON OB2023-10-23 13:45:00
************************************************************[facility] : 1980 Sex: 
F************************************************************ Name: REYES,MARCI 
[facility] : 1980 Age/S: 43 / F [address] [facility] Unit #: 
[id number] Loc:  [place] [address] Phys: Elizabet Hearn MD Acct: [id number]
Dis Date: Status: REG CLI  PHONE #: [phone] Exam Date: 10/23/2023 1138 FAX 
#: Reason: MENORRHAGIA WITH REGULAR CYCLE EXAMS:  CPT: [id number] US TRANSVAGINAL
NON OB 37261 CLINICAL INFORMATION: Menorrhagia. Irregular cycles. Dictation
location: A 1 Comparison: No recent similar prior. Technique: Transabdominal 
and/or transvaginal study was done. Duplex color and spectral Doppler analysis 
of ovarian flow. FINDINGS: The uterus measures 12.4 x 6.2 x 7.2 cm. The 
endometrial echo measured 1.3cm. The ovaries were normal in size and appearance 
with no adnexal mass or fluid identified. Normal blood flow was visualized by 
Doppler. Minimal free fluid was seen in the cul-de-sac. No significant cervical 
cyst formation.  IMPRESSION: Slight thickening of the endometrial echo could be 
due to the phase of menstruation. Otherwise no uterine or adnexal mass 
identified. ** Electronically Signed by FERNANDO Alvarado ** ** on 10/23/2023
at 1345 ** Reported and signed by: Christopher Alvarado M.D.  CC: Agus Perez DO; Elizabet Hearn MDTechnologist: Dalila Hall  Trnscb Date/Time: 10/23/2023
(3567) JoseAGV PAGE 1 Signed Report Name: REYES,MARCI  [facility] : 
1980 Age/S: 43 / F [id number] [address] Unit #: [id number] Loc: [place], [place]
[address]  Phys: Elizabet Hearn MD Acct: [id number] Dis Date: Status: REG CLI 
PHONE #: [phone] Exam Date: 10/23/2023 1135 FAX #: Reason: MENORRHAGIA WITH
REGULAR CYCLE EXAMS: CPT: [id number] [place] [id number] (Continued) 
Orig Print D/T: S: 10/23/2023 (8993) Probe: [id number]  PAGE 2 Signed Report
FALLOPIAN TUBE,[id number] 15:04:00
--------------------------------------------------------------------------------

------------RUN DATE: 20  [facility] PAGE 1 RUN TIME:  
Specimen Inquiry RUN USER: INTERFACE  -------
--------------------------------------------------------------------------------

-----PATIENT: REYES,MARCI ACCT #: [id number] LOC: [facility] U #: [id number] 
AGE/SX: 39/F ROOM: [facility] RE20REG DR: French Andrea MD : [birth year] 
BED: A DIS:  STATUS: ADM IN TLOC: --------------------------------
------------------------------------------------------------ SPEC #: 
[id number] RECD:  STATUS: YASMANI CESPEDES #: [id number] STEFANIE: 20- 
SUBM DR: French Andrea MD ENTERED:  SP TYPE: FALLS OT DR: 
Bisi Ascencio MD ORDERED: LEVEL II SURGIC/2 CODES: [id number] - FALLOPIAN TUBE
COPIES TO: Bisi Ascencio MD [address] [place] [address] [phone] 
[email or web address] French Andrea MD [address] [place] 
[address] [phone] PROCEDURES: LEVEL II SURGIC (Incomplete) TISSUES: 
FALLOPIAN TUBE, NOS - BILATERAL FALLOPIAN TUBES CLINICAL HISTORY 39 year old, 
 @ 37.1 weeks, CHTN (kr) FINAL DIAGNOSIS Specimen #1 right fallopian tube, 
segmental resection: - histologic - complete lumen demonstrated Specimen #2 left
fallopian tube, brennen resection: - histologic  - complete lumen demonstrated
CPT code(s): 88302 x2 cds/wpd 20 ** CONTINUED ON NEXT PAGE ** 
--------------------------------------------------------------------------------

------------RUN DATE: 20 [facility] PAGE 2 RUN TIME:  
Specimen Inquiry RUN USER: INTERFACE 
--------------------------------------------------------------------------------

------------SPEC #: [id number] PATIENT: REYES,MARCI #[id number] 
(Continued)-------------------------------
------------------------------------------------------------- GROSS DESCRIPTION 
ANATOMIC SOURCE OF TISSUE (per Requisition): Fallopian right and left tube 
segments (two containers) Each specimen is labeled with the patient's name and 
medical record number. Specimen #1 is designated "right fallopiantube" and 
consists of a 1.0 cm in length and 0.4 cm in diameter pink-purple and hyperemic 
segment of fallopian tube. The lumen is pinpoint. Representative sections are 
submitted labeled A1. Specimen #2 is designated "left fallopian tube" and 
consists of a 0.8 cm in length and 0.6 cm in diameter pink-purple and hyperemic 
segment of fallopian tube. The lumen is pinpoint. Entirely submitted as B1. da
/denzel 20 MICROSCOPIC DESCRIPTION Cross-sections of fallopian tubes are 
histologic and demonstratecompletely transected lumens. errol/wpprincess 
20------------------------------------------------------
-------------------------------------- Signed ________________________________ 
Miky Ward 20 1504 
--------------------------------------------------------------------------------

------------  ** END OF REPORT **CBC W/AUTO [id number] 05:36:00* 



                      Test Item  Value      Reference Range Interpretation Comme

nts

 

                                                    WHITE BLOOD CELL (test 

code = WBC)     15.5 K/mm3      6.6-12.1        H               Results verified

 by 

repeat analysis

 

                                                    RED BLOOD CELL (test 

code = RBC)     4.01 M/mm3      3.45-5.01       N               

 

                                                    HEMOGLOBIN (test code = 

HGB)            11.6 g/dL       10.7-13.9       N               

 

                                                    HEMATOCRIT (test code = 

HCT)            35.9 %          32.1-42.1       N               

 

                                                    MEAN CELL VOLUME (test 

code = MCV)     90 fL           84.1-94.8       N               

 

                                                    MEAN CELL HGB (test code 

= MCH)          28.9 pg         27-35           N               

 

                                                    MEAN CELL HGB 

CONCETRATION (test code 

= MCHC)         32.3 gm/dL      32.2-34.1       N               

 

                                                    RED CELL DISTRIBUTION 

WIDTH (test code = RDW) 19.0 %          12.4-16.5       H               

 

                                                    PLATELET COUNT (test 

code = PLT)     199 K/mm3       133-385         N               

 

                                                    MEAN PLATELET VOLUME 

(test code = MPV) 11.0 fl         9.1-12.7        N               

 

                                                    NEUTROPHIL % (test code 

= NT%)          85.4 %          56.5-79.4       H               

 

                                                    LYMPHOCYTE % (test code 

= LY%)          8.2 %           14.3-34.3       L               

 

                                                    MONOCYTE % (test code = 

MO%)            5.6 %           5.1-10.4        N               

 

                                                    EOSINOPHIL % (test code 

= EO%)          0.1 %           0.1-3.0         N               

 

                                                    BASOPHIL % (test code = 

BA%)            0.2 %           0.1-1.0         N               

 

                                                    NEUTROPHIL # (test code 

= NT#)          13.2 K/mm3                                      

 

                                                    LYMPHOCYTE # (test code 

= LY#)          1.3 K/mm3                                       

 

                                                    MONOCYTE # (test code = 

MO#)            0.9 K/mm3                                       

 

                                                    EOSINOPHIL # (test code 

= EO#)          0.01 K/mm3                                      

 

                                                    BASOPHIL # (test code = 

BA#)            0.0 K/mm3                                       

 

                                                    RBC MORPHOLOGY REQUIRED 

(test code = RBCM) NORMAL          NORMAL                          

 

                                                    PLATELET MORPHOLOGY 

REQUIRED (test code = 

PLTMR)          NORMAL          NORMAL                          





AG HEPATITIS B [id number] 16:21:00* 



                      Test Item  Value      Reference Range Interpretation Comme

nts

 

                                                    AG HEPATITIS B SURFACE (test

 code 

= HBSAG)        NONREACTIVE     NONREACTIVE                     





IS CONSENT FORM SIGNED FOR HIV TESTING? YAB HEPATITIS C [id number] 
16:21:00* 



                      Test Item  Value      Reference Range Interpretation Comme

nts

 

                                                    AB HEPATITIS C (test code = 

HCVAB)          NONREACTIVE     NONREACTIVE                     

 

                                                    SIGNAL TO CUTOFF (test code 

= 

CUTOFF)         0.15            <0.80           N               





IS CONSENT FORM SIGNED FOR HIV TESTING? YAB [id number] 16:21:00* 



                      Test Item  Value      Reference Range Interpretation Comme

nts

 

                      AB TREPONEMA (test code = TREPAB) NONREACTIVE NONREACTIVE 

           





IS CONSENT FORM SIGNED FOR HIV TESTING? YAB HIV 1  16:21:00* 



                      Test Item  Value      Reference Range Interpretation Comme

nts

 

                                                    AB HIV 1 2 (test 

code = SPX67QC) NONREACTIVE     NONREACTIVE                     Done by [employer] 

[employer] 

4th Gen HIV Ag/Ab Combo 

Screen





IS CONSENT FORM SIGNED FOR HIV TESTING? YAG HEPATITIS B [id number] 
15:52:00* 



                      Test Item  Value      Reference Range Interpretation Comme

nts

 

                                                    AG HEPATITIS B SURFACE (test

 code 

= HBSAG)        NONREACTIVE     NONREACTIVE                     





IS CONSENT FORM SIGNED FOR HIV TESTING? YAB HEPATITIS C [id number] 
15:52:00* 



                      Test Item  Value      Reference Range Interpretation Comme

nts

 

                      AB HEPATITIS C (test code = HCVAB)            NONREACTIVE 

           

 

                      SIGNAL TO CUTOFF (test code = CUTOFF)            <0.80    

             





IS CONSENT FORM SIGNED FOR HIV TESTING? CRISTIB [id number] 15:52:00* 



                      Test Item  Value      Reference Range Interpretation Comme

nts

 

                      AB TREPONEMA (test code = TREPAB) NONREACTIVE NONREACTIVE 

           





IS CONSENT FORM SIGNED FOR HIV TESTING? NOELLE HIV 1  15:52:00* 



                      Test Item  Value      Reference Range Interpretation Comme

nts

 

                      AB HIV 1 2 (test code = [id number])            NONREACTIVE   

         





IS CONSENT FORM SIGNED FOR HIV TESTING? YUR PROTEIN/CREATININE [id number] 
15:35:00* 



                      Test Item  Value      Reference Range Interpretation Comme

nts

 

                                                    UR PROTEIN RANDOM (test code

 = 

PROTU)          33.0 mg/dL                                      

 

                                                    UR CREATININE RANDOM (test 

code = CREATU)  78.8 mg/dL                                      

 

                                                    PROTEIN/CREATININE RATIO (te

st 

code = P/CRATIO) 410.0 mg/gcrea  <200            H               





URIC [id number] 15:02:00* 



                      Test Item  Value      Reference Range Interpretation Comme

nts

 

                      URIC ACID (test code = URIC) 4.7 mg/dL  2.6-6.0    N      

    





[id number] 15:02:00* 



                      Test Item  Value      Reference Range Interpretation Comme

nts

 

                      SGOT/AST (test code = AST) 33 units/L 15-37               

  





[id number] 15:02:00* 



                      Test Item  Value      Reference Range Interpretation Comme

nts

 

                      SGPT/ALT (test code = ALT) 24 units/L 12-78      N        

  





LACTIC DEHYDROGENASE(LDH)2020 15:02:00* 



                      Test Item  Value      Reference Range Interpretation Comme

nts

 

                                                    LACTIC DEHYDROGENASE(LDH) (t

est 

code = LDH)     198 units/L               N               





URINALYSIS W/O [id number] 14:30:00* 



                      Test Item  Value      Reference Range Interpretation Comme

nts

 

                                                    UA GLUCOSE DIPSTICK (test co

de = 

DGLUU)          NEGATIVE        NEGATIVE                        

 

                                                    UA KETONE DIPSTICK (test cod

e = 

KETU)           1+              NEGATIVE                        

 

                                                    UA PROTEIN DIPSTICK (test co

de = 

PROU)           NEGATIVE        NEGATIVE                        





IS NURSE PERFORMING TEST? NCBC W/AUTO [id number] 14:28:00* 



                      Test Item  Value      Reference Range Interpretation Comme

nts

 

                      WHITE BLOOD CELL (test code = WBC) 8.1 K/mm3  6.6-12.1   N

          

 

                      RED BLOOD CELL (test code = RBC) 4.32 M/mm3 3.45-5.01  N  

        

 

                      HEMOGLOBIN (test code = HGB) 12.1 g/dL  10.7-13.9  N      

    

 

                      HEMATOCRIT (test code = HCT) 38.7 %     32.1-42.1  N      

    

 

                      MEAN CELL VOLUME (test code = MCV) 90 fL      84.1-94.8  N

          

 

                      MEAN CELL HGB (test code = MCH) 28.0 pg    27-35      N   

       

 

                                                    MEAN CELL HGB CONCETRATION (

test 

code = MCHC)    31.3 gm/dL      32.2-34.1       L               

 

                                                    RED CELL DISTRIBUTION WIDTH 

(test 

code = RDW)     19.4 %          12.4-16.5       H               

 

                      PLATELET COUNT (test code = PLT) 196 K/mm3  133-385    N  

        

 

                                                    MEAN PLATELET VOLUME (test c

ode = 

MPV)            11.5 fl         9.1-12.7        N               

 

                      NEUTROPHIL % (test code = NT%) 72.7 %     56.5-79.4  N    

      

 

                      LYMPHOCYTE % (test code = LY%) 18.1 %     14.3-34.3  N    

      

 

                      MONOCYTE % (test code = MO%) 7.1 %      5.1-10.4   N      

    

 

                      EOSINOPHIL % (test code = EO%) 1.0 %      0.1-3.0    N    

      

 

                      BASOPHIL % (test code = BA%) 0.4 %      0.1-1.0    N      

    

 

                      NEUTROPHIL # (test code = NT#) 5.9 K/mm3                  

      

 

                      LYMPHOCYTE # (test code = LY#) 1.5 K/mm3                  

      

 

                      MONOCYTE # (test code = MO#) 0.6 K/mm3                    

    

 

                      EOSINOPHIL # (test code = EO#) 0.08 K/mm3                 

      

 

                      BASOPHIL # (test code = BA#) 0.0 K/mm3                    

    

 

                                                    RBC MORPHOLOGY REQUIRED (mita

t code 

= RBCM)         NORMAL          NORMAL                          

 

                                                    PLATELET MORPHOLOGY REQUIRED

 (test 

code = PLTMR)   NORMAL          NORMAL                          





AG HEPATITIS B [id number] 15:57:00* 



                      Test Item  Value      Reference Range Interpretation Comme

nts

 

                                                    AG HEPATITIS B SURFACE (test

 code 

= HBSAG)        NONREACTIVE     NONREACTIVE                     





AB HEPATITIS C [id number] 15:57:00* 



                      Test Item  Value      Reference Range Interpretation Comme

nts

 

                                                    AB HEPATITIS C (test code = 

HCVAB)          NONREACTIVE     NONREACTIVE                     

 

                                                    SIGNAL TO CUTOFF (test code 

= 

CUTOFF)         0.15            <0.80           N               





AB [id number] 15:57:00* 



                      Test Item  Value      Reference Range Interpretation Comme

nts

 

                      AB TREPONEMA (test code = TREPAB) NONREACTIVE NONREACTIVE 

           





AG HEPATITIS B [id number] 15:22:00* 



                      Test Item  Value      Reference Range Interpretation Comme

nts

 

                                                    AG HEPATITIS B SURFACE (test

 code 

= HBSAG)        NONREACTIVE     NONREACTIVE                     





AB HEPATITIS C [id number] 15:22:00* 



                      Test Item  Value      Reference Range Interpretation Comme

nts

 

                      AB HEPATITIS C (test code = HCVAB)            NONREACTIVE 

           

 

                      SIGNAL TO CUTOFF (test code = CUTOFF)            <0.80    

             





AB [id number] 15:22:00* 



                      Test Item  Value      Reference Range Interpretation Comme

nts

 

                      AB TREPONEMA (test code = TREPAB) NONREACTIVE NONREACTIVE 

           





BLOOD UREA [id number] 14:50:00* 



                      Test Item  Value      Reference Range Interpretation Comme

nts

 

                      BLOOD UREA NITROGEN (test code = BUN) 9 mg/dL    7-18     

  N          





[id number] 14:50:00* 



                      Test Item  Value      Reference Range Interpretation Comme

nts

 

                      CREATININE (test code = CREAT) 0.6 mg/dL  0.5-1.0    N    

      





URIC [id number] 14:50:00* 



                      Test Item  Value      Reference Range Interpretation Comme

nts

 

                      URIC ACID (test code = URIC) 4.7 mg/dL  2.6-6.0    N      

    





[id number] 14:50:00* 



                      Test Item  Value      Reference Range Interpretation Comme

nts

 

                      SGOT/AST (test code = AST) 21 units/L 15-37      N        

  





[id number] 14:50:00* 



                      Test Item  Value      Reference Range Interpretation Comme

nts

 

                      SGPT/ALT (test code = ALT) 21 units/L 12-78      N        

  





LACTIC DEHYDROGENASE(LDH)2020 14:50:00* 



                      Test Item  Value      Reference Range Interpretation Comme

nts

 

                                                    LACTIC DEHYDROGENASE(LDH) (t

est 

code = LDH)     176 units/L               N               





URINALYSIS [id number] 14:45:00* 



                      Test Item  Value      Reference Range Interpretation Comme

nts

 

                      UA COLOR (test code = COLU) YELLOW     YELLOW             

   

 

                                                    UA APPEARANCE (test code = 

APPU)           Slightly-Cloudy CLEAR                           

 

                                                    UA GLUCOSE DIPSTICK (test 

code = DGLUU)   NEGATIVE        NEG                             

 

                                                    UA BILIRUBIN DIPSTICK (test 

code = BILU)    NEGATIVE        NEG                             

 

                                                    UA KETONE DIPSTICK (test cod

e 

= KETU)         2+              NEG             A               

 

                                                    UA SPECIFIC GRAVITY (test 

code = SGU)     1.021           1.001-1.035     N               

 

                                                    UA BLOOD DIPSTICK (test code

 

= JANES)          NEG             NEG                             

 

                                                    UA PH DIPSTICK (test code = 

GABRIELA)            5.0             5-9                             

 

                                                    UA PROTEIN DIPSTICK (test 

code = PROU)    NEGATIVE        NEG                             

 

                                                    UA UROBILINIOGEN DIPSTICK 

(test code = URO) NEGATIVE mg/dL  NEG                             

 

                                                    UA NITRITE DIPSTICK (test 

code = MAGALY)    POSITIVE        NEG             A               

 

                                                    UA LEUKOCYTE ESTERASE 

DIPSTICK (test code = LEUU) NEG             NEG                             

 

                      UA WBC (test code = WBCU) 6-10 #/hpf NONE SEEN  A         

 

 

                      UA RBC (test code = RBCU) 3-5 #/hpf  NONE SEEN  A         

 

 

                                                    UA EPITHELIAL CELLS (test 

code = EPIU)    FEW #/HPF       RARE-FEW                        

 

                                                    UA BACTERIA (test code = 

BACU)           MODERATE /HPF   RARE-FEW        A               

 

                      UA MUCUS (test code = MUCU) 3+         NONE SEEN          

   





URINE SAMPLE: CLEAN CATCHCBC W/AUTO [id number] 14:37:00* 



                      Test Item  Value      Reference Range Interpretation Comme

nts

 

                      WHITE BLOOD CELL (test code = WBC) 8.9 K/mm3  6.6-12.1   N

          

 

                      RED BLOOD CELL (test code = RBC) 4.22 M/mm3 3.45-5.01  N  

        

 

                      HEMOGLOBIN (test code = HGB) 11.8 g/dL  10.7-13.9  N      

    

 

                      HEMATOCRIT (test code = HCT) 37.5 %     32.1-42.1  N      

    

 

                      MEAN CELL VOLUME (test code = MCV) 89 fL      84.1-94.8  N

          

 

                      MEAN CELL HGB (test code = MCH) 28.0 pg    27-35      N   

       

 

                                                    MEAN CELL HGB CONCETRATION (

test 

code = MCHC)    31.5 gm/dL      32.2-34.1       L               

 

                                                    RED CELL DISTRIBUTION WIDTH 

(test 

code = RDW)     19.3 %          12.4-16.5       H               

 

                      PLATELET COUNT (test code = PLT) 191 K/mm3  133-385    N  

        

 

                                                    MEAN PLATELET VOLUME (test c

ode = 

MPV)            10.7 fl         9.1-12.7        N               

 

                      NEUTROPHIL % (test code = NT%) 73.5 %     56.5-79.4  N    

      

 

                      LYMPHOCYTE % (test code = LY%) 18.9 %     14.3-34.3  N    

      

 

                      MONOCYTE % (test code = MO%) 5.6 %      5.1-10.4   N      

    

 

                      EOSINOPHIL % (test code = EO%) 0.9 %      0.1-3.0    N    

      

 

                      BASOPHIL % (test code = BA%) 0.3 %      0.1-1.0    N      

    

 

                      NEUTROPHIL # (test code = NT#) 6.5 K/mm3                  

      

 

                      LYMPHOCYTE # (test code = LY#) 1.7 K/mm3                  

      

 

                      MONOCYTE # (test code = MO#) 0.5 K/mm3                    

    

 

                      EOSINOPHIL # (test code = EO#) 0.08 K/mm3                 

      

 

                      BASOPHIL # (test code = BA#) 0.0 K/mm3                    

    

 

                                                    RBC MORPHOLOGY REQUIRED (mita

t code 

= RBCM)         NORMAL          NORMAL                          

 

                                                    PLATELET MORPHOLOGY REQUIRED

 (test 

code = PLTMR)   NORMAL          NORMAL                          





- US PREGNANCY [facility] [id number] 12:29:00Patient Name: REYES,MARCI Unit No: 
[id number] EXAMS: CPT CODE: [id number] [place] [id number] [facility] [address] OBSTETRICAL ULTRASOUND REPORT 
---------------------------------------------------------------------- Pat. 
Name: REYES, MARCI Pat. No: [id number] Study Date: 2020 11:28am , Age: 
1980, 39 Pregnancies:  4, Para 3 LMP: 2019 GA by LMP: 34w2d 
GA by 1st: 34w2d GA by US: 35w2d GA Selected: 34w2d (LMP) FREDRICK: 2020 Ref
erring MD: French Andrea Sonographer: Aleida Mendoza RDMS, RVT CPT4: USPREGFU
Admitting MD: French Andrea Hist/Ind: SCAN 3 F/U GROWTH HTN 
---------------------------------------------------------------------- 
MEASUREMENTS FETAL AGE FETAL GROWTH EVALUATION Measurement GA Range Srce %for GA
Ratios ----------- ----- ------------- ---- ------- ------------------------- 
BPD 8.9 cm 36w4d (74v5d-77o1y) Hadl BPD 83% FL/BPD 0.73 (0.71 - 0.87) HC 32.4 cm
36w1d (70z7j-66o1f) Hadl HC 82% FL/AC 0.19 (0.20 - 0.24* APD 11.2 cm APD HC/AC 
0.94 (0.94 - 1.13* TAD 10.8 cm TAD CI 0.81 (0.70 - 0.86) AC 34.6 cm 38w5d 
(76t9u-34b9l) Hadl AC >95 FL 6.5 cm 33w2d (67d6u-10i3m) Hadl FL 34% HL 6.0 cm 
34w5d (52f4w-70t4s) Logan HL 58% GA for sonogram 35w2d (87p1r-08e7j) Fetal Weight
Estimate: based on (BPD,HC,AC,FL) Hadlock Weight: 3045 gm (2685-5475) Hadlo : 
6lbs, 11oz Normal: 2283 gm (7481-6214) Brenn  Wt% &gt;90 for 34.3 wks Cervical 
Length: 4.8 cm Fetal Heart Rate: 147 bpm Amniotic Fluid Index: 08.9cm (08.0-
24.8) Q1: 1.7cm Q2: 4.2cm Q3: 0.0cm Q4: 3.0cm 
---------------------------------------------------------------------- MATERNAL 
ANATOMY ---------------------------------------------------------------------- 
Ovaries LxHxW (cm) Right 3.7 x 1.9 x 2.0 Vol: 7.4cc Left 3.7 x 2.1 x 2.1 Vol: 
8.5cc ---------------------------------------------------------------------- 
---------------------------------------------------------------------- CLINICAL 
SUMMARY  Type of Gestation: Juárez Intrauterine pregnancy in vertex 
presentation. Fetal size is large for gestational age. [facility] NAME: REYES,MARCI  Radiology Department PHYS: French Garcia MD
[address] : 1980 AGE: 39 SEX: F [place], [place] [address]  ACCT NO: 
[id number] LOC: NATALIERAD PHONE #: [phone] EXAM DATE: 2020 STATUS: REG 
CLI FAX #: [phone] RAD NO: Page 1  Signed Report (CONTINUED) Patient Name: 
REYES,MARCI Unit No: [id number] EXAMS: CPT CODE: [id number] US PREGNANCY FLW UP 
93898 (Continued) Fetal growth: Suspect LGA fetus (>90%) Fetal motion and organs
seen: Fetal heart motion seenFetal body and limb movements observed Placental 
location: Anterior Placental maturity : Grade 2 There is no evidence of placenta
previa. Amniotic fluid volume is normal. Uterus and adnexa: No significant 
abnormality is seen. Thank you for allowing us to participate in the care of 
this patient. Mack Blunt M.D. ----------------- Electronic Signature 
2020 12:29pm ** Electronically Signed byMack Blunt MD on 2020 at 
1229 ** Reported and signed by: Mack Blunt MD  CC: French Andrea MD 
Technologist: Aleida Mendoza RDMS, RVT Probe: Trnscrbd D/T: 2020 (1229) 
t.SDR.YOS Orig Print D/T: S: 2020 (1229) [facility] 
NAME: REYES,MARCI Radiology Department PHYS: French Garcia MD [address] : 1980 AGE: 39 SEX: F [place] [address] ACCT NO: [id number]
LOC: CHELITA.RAD PHONE #: [phone] EXAM DATE: 2020 STATUS: REG CLI FAX #: 
[phone] RAD NO: Page 2 Signed Report Patient Name: REYES,MARCI Unit No: 
[id number] EXAMS:  CPT CODE: [id number] US PREGNANCY FLW UP 08693 (Continued)  
[facility] NAME: REYES,MARCI Radiology Department PHYS: 
[facility] [[email or web address]]French Larry MD [address] : 1980 AGE: 39 SEX: F [place] [address] ACCT NO: [id number] LOC: CHELITA.RAD PHONE #: [phone] EXAM DATE: 
2020 STATUS: REG CLI FAX #: [phone] RAD NO: Page 3 Signed Report- US 
PREG AFTER  TRI2019-10-07 10:50:00Patient Name: REYES,MARCI Unit No: 
[id number] EXAMS: CPT CODE: [id number] US PREG AFTER  TRI [address] [facility] [address] [place], [place] [address]  OBSTETRICAL ULTRASOUND 
REPORT ---------------------------------------------------------------------- 
Pat. Name: REYES, MARCI Pat. No: [id number] Study Date: 10/07/2019 9:50am , 
Age: 1980, 39 Pregnancies:  4, Para 3 LMP: 2019 GA by LMP: 
20w1d GA by 1st: 20w1d GA by US: 21w2d GA Selected: 20w1d (LMP) FREDRICK:  2020
Referring MD: French Andrea Sonographer: Janet Gaona RDMS CPT4: 
[id number] Admitting MD: French Andrea Hist/Ind: ANATOMY SCAN 2 
---------------------------------------------------------------------- 
MEASUREMENTS FETAL AGE FETAL GROWTH EVALUATION Measurement GA Range Srce %for GA
Ratios ----------- ----- ------------- ---- ------- ------------------------- 
BPD 4.9 cm 20w6d (93l5k-90z2k) Hadl BPD 72% FL/BPD 0.65 HC 18.2 cm 20w3d (18w6d-
22w1d) Hadl HC 61% FL/AC 0.19 APD 5.4 cm  APD HC/AC1.08 (1.06 - 1.24) TAD 5.3 cm
TAD CI 0.80 (0.70 - 0.86) AC 16.8 cm 21w4d (28u0r-77z9z) Hadl AC 83% FL 3.2 cm 
19w4d (04m7l-20z2t) Hadl FL 39% HL 3.0 cm 19w6d (78s2e-61x7r) Logan HL 46% GA for
sonogram 21w2d (20z1f-76x1f) Fetal Weight Estimate: based on (BPD,AC) Hadlock 
Weight: 387 gm (331-444) Hadlock  : 0lbs, 13oz Cervical Length: 4.9 cm Fetal 
Heart Rate: 168 bpm -----------------------------------
----------------------------------- CLINICAL SUMMARY Type of Gestation: 
Juárez Intrauterine pregnancy in vertex presentation. Fetal size is 
appropriate for gestational age. Fetal motion and organsseen: Fetal heart motion
seen Fetal somatic activity observed Fetal body and limb movements seen Four 
chamber fetal heart observed Left ventricular outflow tract (LVOT) seen Right 
ventricular outflowtract (RVOT) seen Normal intracranial anatomy seen Umbilical 
cord insertion in fetus seen Fetal stomach, Renal Fossa, Bladder and Spine seen 
Three vessel umbilical cord noted Placental location: [facility] NAME: REYES,MARCI Radiology Department PHYS: French Garcia MD 
[address] : 1980 AGE: 39 SEX: F [place], [place] [address] ACCT NO: 
[id number] LOC: NATALIERAD PHONE #: [phone] EXAM DATE: 10/07/2019 STATUS: REG 
CLI FAX #: [phone] RAD NO: Page 1 Signed Report (CONTINUED) Patient Name: 
REYES,MARCI  Unit No: [id number] EXAMS: CPT CODE: [id number] US PREG AFTER 1ST 
TRI 89681 (Continued)  Anterior Placental maturity : Grade 1 There is no 
evidence of placenta previa. Amniotic fluid volume is normal. Uterus and adnexa:
No significant abnormality is seen. Thank you for allowing us to participate in 
the care of this patient. Zana Guzmán M.D. ------------------ Electronic 
Signature 10/07/2019 10:50am ** Electronically Signed by Zana Guzmán MD on 
10/07/2019 at 1050 ** Reported and signed by: Zana Guzmán MD CC: French Andrea MD  Technologist: Janet Gaona RDMS Probe: Trnscrbd D/T: 10/07/2019
(1050) t.SDR.AJ13 Orig Print D/T: S: 10/07/2019 (1050) [facility] NAME: REYES,MARCI Radiology Department  PHYS: [facility] [[email or web address]]CAROLYN French Valentine MD
[address] : 1980 AGE: 39 SEX: F [place] [address] ACCT NO: 
[id number] LOC: NATALIERAD  PHONE #: [phone] EXAM DATE: 10/07/2019 STATUS: REG
CLI FAX #: [phone] RAD NO: Page 2 Signed Report  Patient Name: REYES,MARCI 
Unit No: [id number] EXAMS: CPT CODE: [id number] US PREG AFTER 1ST TRI  [address] 
(Continued) [facility]: PAKO French Valentine MD [address] : 1980 AGE: 39 SEX: F 
[place] [address]  ACCT NO: [id number] LOC: NATALIERAD PHONE #: [phone] 
EXAM DATE: 10/07/2019 STATUS: REG CLI FAX #: [phone] RAD NO: Page 3  Signed
Report- US PREG EVAL 1ST [id number] 15:04:00Patient Name: REYES,MARCI Unit
No: [id number] EXAMS: CPT CODE: [id number] US PREG EVAL 1ST TRIMTR  [address] [facility] [address] [place], [place] [address] OBSTETRICAL ULTRASOUND REPORT
---------------------------------------------------------------------- Pat. 
Name: REYES, MARCI Pat. No: [id number] Study Date: 2019 1:53pm , Age: 
1980, 39 Pregnancies:  4, Para 3 LMP: 2019 GA by LMP: 13w1d 
GA by US: 13w2d GA Selected: 13w1d (LMP) FREDRICK: 2020 Referring MD: FRENCH ANDREA Sonographer: Aleida Mendoza RDMS, RVT CPT4: [id number] Admitting MD: 
FRENCH ANDREA/Ind: SCAN 1 VIABILITY 
---------------------------------------------------------------------- ME
ASUREMENTS FETAL AGE FETAL GROWTH EVALUATION Measurement GA Range Srce %for GA 
Ratios ----------- ----- ------------- ---- ------- ------------------------- 
CRL 7.4 cm 13w4d (64h6w-89i2l) Hadl CRL 67% FL 1.1 cm 13w1d (77u4b-54c8g) Hadl 
FL 50% GA for sonogram 13w2d (24u0k-41x2w) based on (CRL,FL) Avg Cervical 
Length: 3.7 cm Fetal Heart Rate: 162 bpm 
---------------------------------------------------------------------- MATERNAL 
ANATOMY ---------------------------------------------------------------------- 
Ovaries LxHxW (cm) Right 3.7 x 1.9 x 2.3 Vol: 8.5cc Left 4.4 x 2.4 x 2.1 Vol: 
11.6cc ---------------------------------------------------------------------- 
---------------------------------------------------------------------- CLINICAL 
SUMMARY Type of Gestation: Juárez Intrauterine pregnancy in variable 
presentation. Fetal motion and organs seen: Fetal heart motion seen Placental 
location: Forming Amniotic fluid volume is normal. Uterus and adnexa: Left 
ovarian CLC 30 x 18 x 20mm Cristine Christensen M.D. -------------------- Electronic 
Signature 2019 03:04pm [facility] NAME: REYES,MARCI 
Radiology Department PHYS: PAKO - French Andrea MD [address]  : 
1980 AGE: 39 SEX: F [place], [place] [address] ACCT NO: [id number] LOC: NATALIERAD 
PHONE #: [phone] EXAM DATE: 2019 STATUS: PRE CLI FAX #: [phone] 
RAD NO: Page 1 Signed Report (CONTINUED) Patient Name: REYES,MARCI Unit No: 
[id number] EXAMS: CPT CODE: [id number] US PREG EVAL 1ST [id number] (Continued) 
** Electronically Signed by Cristine Christensen MD on 2019 at 1504 ** Reported and
signed by: Cristine Christensen MD  CC: French Andrea MD Technologist: Aleida Mendoza 
RDMS, RVT Probe:  Trnscrbd D/T: 2019 (1504) t.SDR.NMG Orig Print D/T: S: 
2019 (1503) [facility] NAME: REYES,MARCI  Radiology 
Department PHYS: [facility] [[email or web address]]French Larry MD [address] : 1980 AGE: 
39 SEX: F [place] [address] ACCT NO: [id number] LOC: NATALIERAD PHONE #: 
[phone] EXAM DATE: 2019 STATUS: PRE CLI FAX #: [phone] RAD NO: 
Page 2 Signed Report Patient Name:REYES,MARCI Unit No: [id number] EXAMS: CPT 
CODE: [id number] US PREG EVAL 1ST TRIMTR 26104 (Continued) [facility] NAME: REYES,MARCI Radiology Department PHYS: PAKO Hoang Andrea MD [address] : 1980 AGE: 39 SEX: F [place] [address] ACCT NO: 
[id number] LOC: NATALIERAD PHONE #: [phone] EXAM DATE: 2019 STATUS: PRE 
CLI FAX #: [phone] RAD NO:  Page 3 Signed Report- US PREG UT TRANSVAGINAL
2019 15:04:00Patient Name: REYES,MARCI Unit No: [id number] EXAMS: CPT 
CODE: [id number] US PREG UT TRANSVAGINAL [address] [facility] [address] [place], [place] [address] OBSTETRICAL ULTRASOUND REPORT --------
-------------------------------------------------------------- Pat. Name: REYES,
MARCI Pat. No: [id number] Study Date: 2019 1:53pm , Age: 1980, 39
Pregnancies:  4, Para 3 LMP: 2019 GA by LMP: 13w1d GA by US: 13w2d 
GA Selected: 13w1d (LMP) FREDRICK: 2020 Referring MD: French Andrea  
Sonographer: Aleida Mendoza RDMS, RVT CPT4: [id number] Hist/Ind: SCAN 1 
VIABILITY ----------------------------------------------------------------------
MEASUREMENTS FETAL AGE FETAL GROWTH EVALUATION Measurement GA Range Srce %for GA
Ratios ----------- ----- ------------- ---- ------- ------------------------- 
CRL 7.4 cm 13w4d (96g2v-05e8v) Hadl CRL 67% FL 1.1 cm 13w1d (87m7r-06v6b) Hadl 
FL 50% GA for sonogram 13w2d (53i5z-39n7b) based on (CRL,FL) Avg Cervical 
Length: 3.7 cm Fetal Heart Rate: 162 bpm 
---------------------------------------------------------------------- MATERNAL 
ANATOMY ---------------------------------------------------------------------- 
Ovaries LxHxW (cm) Right 3.7 x 1.9 x 2.3 Vol: 8.5cc Left 4.4 x 2.4 x 2.1 Vol: 
11.6cc ---------------------------------------------------------------------- 
---------------------------------------------------------------------- CLINICAL 
SUMMARY Type of Gestation: Juárez Intrauterine pregnancy in variable 
presentation. Fetal motion and organs seen: Fetal heart motion seen Placental 
location: Forming Amniotic fluid volume is normal. Uterus and adnexa: Left 
ovarian CLC 30 x 18 x 20mm Cristine Christensen M.D. -------------------- Electronic 
Signature 2019 03:04pm [facility] NAME: REYES,MARCI 
diology Department PHYS: [email or web address] - French Andrea MD [address]  : 
1980 AGE: 39 SEX: F [place], [place] [address] ACCT NO: [id number] LOC: MARGIE 
PHONE #: [phone] EXAM DATE: 2019 STATUS: PRE CLI FAX #: [phone] 
RAD NO: Page 1 Signed Report (CONTINUED) Patient Name: REYES,MARCI Unit No: 
[id number] EXAMS: CPT CODE: [id number] [facility] TRANSVAGINAL [address] (Continued) 
** Electronically Signed by Cristine Christensen MD on 2019 at 1503 ** Reported and
signed by: Cristine Christensen MD  CC:French Andrea MD Technologist: Aleida Mendoza RDMS, RVT Probe: [id number] Trnscrbd D/T: 2019(1508) Mitesh Orig Print 
D/T: S: 2019 (1655) [facility] NAME: REYES,MARCI  
Radiology Department PHYS: [facility] [[email or web address]]RAFAEL Callahan French Andrea MD [address] : 
1980 AGE: 39 SEX:F [place] [address] ACCT NO: [id number] LOC: NATALIERAD 
PHONE #: [phone] EXAM DATE: 2019STATUS: PRE CLI FAX #: [phone] 
RAD NO: Page 2 Signed Report Patient Name: REYES,MARCI Unit No:[id number] EXAMS:
CPT CODE: [id number]  PREG UT TRANSVAGINAL [address] (Continued) [facility] NAME: REYES,MARCI Radiology Department PHYS: AMMY Callahan 
French Andrea MD [address] : 1980 AGE: 39 SEX: F [place] [address] ACCT NO: [id number] LOC: NATALIERAD PHONE #: [phone] EXAM DATE: 
2019 STATUS: PRE CLI FAX #: [phone] RAD NO: Page 3 Signed Report



Notes





                          Date/Time    Note         Provider     Source

 

                                        2024 08:51:00 [id number] [place] [address]





PATIENT NAME: REYES,MARCI KAYE ADMIT DATE: 

24

ACCOUNT NO: [id number] ROOM NO: [facility]

MEDICAL RECORD NO: [id number] AGE: 44

SEX: F



ADMITTING PHYSICIAN: Abril Wood MD

ATTENDING PHYSICIAN: Abril Wood MD





OPERATION DATE: 2024



PREOPERATIVE DIAGNOSES:

1. Menometrorrhagia.

2. Enlarged uterus.

3. Endometrial polyp.

4. Pelvic pain.



POSTOPERATIVE DIAGNOSES:

1. Menometrorrhagia.

2. Enlarged uterus.

3. Endometrial polyp.

4. Pelvic pain.

5. Endometriosis.



PROCEDURES:

1. Robotically assisted total laparoscopic 

hysterectomy, bilateral

salpingectomy.

2. Robotically assisted laparoscopic excision of 

endometriosis.

3. Robotically assisted laparoscopic lysis of 

adhesions.



SURGEON: Abril Wood MD



ASSISTANT: Mitul Esquivel



ANESTHESIA: General.



ESTIMATED BLOOD LOSS: 100 mL



INTRAVENOUS FLUIDS: 1200 mL



URINE OUTPUT: 500 mL of clear urine.



COMPLICATIONS: None.



COUNTS: Correct.



FINDINGS: A 12-week size uterus with bladder 

adherent to the mid and lower

uterine segment, evidence of bilateral tubal 

ligation, normal ovaries

bilaterally. Endometrial implants on the left 

pelvic sidewall, posterior

cul-de-sac and right pelvic sidewall, colon 

adhered to left abdominal sidewall.

Omental adhesion to the anterior abdominal wall. 

Normal-appearing upper

abdomen.



PATIENT NAME: REYES,MARCI KAYE ACCOUNT #: 

[id number]









INDICATIONS: The patient is a 43-year-old  

4, para 4, who presented with

menometrorrhagia. The patient was found on 

ultrasound to have enlarged uterus

as well as a possible endometrial polyp. The 

patient also had chronic pelvic

pain and was noted to have endometriosis. Risks, 

benefits and alternatives were

discussed with the patient. The patient agreed to 

above-named procedures.



PROCEDURE IN DETAIL: The patient was taken to the 

OR with IV in place. She was

induced under general anesthesia without 

difficulty. The patient was given

Ancef preoperatively for prophylaxis as well as 

Lovenox 40 mg subcutaneously for

DVT prophylaxis. The patient was placed in dorsal 

lithotomy position in

[facility]. She was prepped and draped in the usual 

sterile manner. Hussein

catheter was placed in bladder sterilely. 

Anterior lip of the cervix was

grasped with single tooth tenaculum. Cervix was 

gently dilated from size 9 to

size 15 using Marco Antonio dilators. Uterus was gently 

sounded to 12 cm. Stay sutures

were placed at 3 o'clock and 9 o'clock, a 12 cm 

DESTINEY uterine manipulator, 3.0

colpotomizer ring and vaginal occluder were then 

placed in the uterus, on the

cervix and in the vagina respectively. These were 

attached to stay sutures.

Vaginal occluder was filled with 60 mL of saline. 

The patient was then placed

flat. Attention was then turned to the abdomen. 

Due to prior  section

x4 with anticipation of possible intra-abdominal 

adhesions, entry was made in

Khanna's point. Approximately 3 fingerbreadths 

below the costal margin along the

left midclavicular line, 8 mm transverse incision 

was made using the AirSeal

port and 5 mm 0-degree scope direct entry into 

the abdominal cavity was

accomplished easily. CO2 gas was then used to 

insufflate the abdomen with

pressure limit set at 15 mmHg. The patient was 

then placed in Trendelenburg.

Survey of the abdomen revealed an omental 

adhesions below the umbilicus in the

midline. Uterus was noted to be enlarged. Upper 

abdomen was within normal

limits. Umbilical port site trocar was placed 

under direct visualization as

well as 2 ancillary ports, one in right lower 

quadrant, one in the left lower

quadrant, all 8 mm ports. Laparoscope was 

removed. Robot was docked.

Fenestrated bipolar instrument was placed in the 

left lower quadrant. Monopolar

instrument was placed in right lower quadrant. 

Laparoscope was placed in the

umbilical port site. Omental adhesions to 

anterior abdominal wall was carefully

lysed. At that point, colon was noted to be 

adhered to left abdominal sidewall.

These adhesions were carefully lysed with extreme 

care to avoid injuring colon.

At that point, the left adnexal area was more 

easily visualized. The patient

was pretreated with IV Benadryl. ICG dye was 

injected intravenously. Pelvis

was then thoroughly inspected and endometrial 

implants were then noted along the

left pelvic sidewall and the posterior cul-de-sac 

and along the right pelvic

sidewall. No lesions were noted anteriorly; 

however bladder was noted to be

adhered to the mid and lower uterine segment. 

Attention was first turned to the

left pelvic sidewall. Ureter was identified. 

Incision was made in the

peritoneum. Hydrodissection was performed. 

Endometrial implants were then

carefully circumscribed and excised adjacent in 

the area of the left ovarian

fossa. Another cluster of lesions were 

circumscribed and excised along the left

pelvic sidewall adjacent to the left uterosacral 

ligament. Attention was then

turned to lesions in the posterior cul-de-sac. 

Lesion in the mid posterior

cul-de-sac was carefully circumscribed and 

excised with extreme care to avoid

injuring rectum. Another cluster of lesions was 

circumscribed and excised in

the right posterior cul-de-sac. Attention was 

then turned to the right pelvic

sidewall. Again, ureter was identified. Incision 

was made in the peritoneum.

Hydrodissection was performed. The lesions along 

the right pelvic sidewall

carefully circumscribed and excised. At that 

point, no other obvious lesions

were noted. Attention was then turned to the left 

adnexal area. Left



PATIENT NAME: REYES,MARCI KAYE ACCOUNT #: 

[id number]







uteroovarian ligament was cauterized using 

bipolar instrument then divided using

monopolar scissors. Left round ligament was 

divided in similar fashion.

Bladder was then back filled. Anterior leaflet 

was incised and dense adhesions

of the bladder to the anterior uterus were 

carefully lysed with extreme care to

avoid injuring the bladder. Bladder flap was then 

developed in the midline.

Uterine vessels were then skeletonized on the 

left side by incising posterior

peritoneum. Uterine vessels were then cauterized 

at the level of the internal

cervical os. Attention was then turned to the 

right side. Right uteroovarian

ligament was cauterized using bipolar instrument 

then divided using monopolar

scissors. Right round ligament was divided in 

similar fashion.  Anterior leaf

of the peritoneum was incised and carried out 

medially to meet the other side.

Bladder was additionally dissected off lower 

uterine segment on the right side.

Uterine vessels were skeletonized on the right 

side by incising posterior

peritoneum. Uterine vessels were then cauterized 

at the level of the internal

cervical os. After assuring that the bladders 

were dissected off the lower

uterine segment, colpotomy was performed at the 

cervicovaginal junction

anteriorly from 10 o'clock to 2 o'clock. Uterine 

vessels were then again

encountered on the right side, were cauterized 

using bipolar instrument then

divided using monopolar scissors. Colpotomy was 

continued posteriorly over to

the left side. Left uterine vessels were 

cauterized using bipolar instrument

then divided using monopolar scissors. Colpotomy 

was completed. Uterus was

then brought partially into the vagina, left in 

place to maintain

pneumoperitoneum. Attention was then turned to 

the left adnexa. Left fallopian

tube was elevated and a note was noted to be 

adhered to the ovary. Adhesions

were carefully lysed. Mesosalpinx was then 

incised to excise the tube. Tube

was removed through the assistant port. Same was 

performed on the right side.

At that point, the uterus was removed through as 

0 V-Loc suture was introduced

into the pelvis. Sterile sponge and glove was 

placed in the vagina to maintain

pneumoperitoneum. Vaginal cuff was then closed in 

2 layers, first to

reapproximate the vaginal mucosa from right apex 

over to the left apex. Same

suture was used to reapproximate the endopelvic 

fascia from left apex over to

right apex. Suture was then ran back towards the 

midline. Pelvis was then

thoroughly irrigated and checked under low 

pressure. Good hemostasis was noted.

Good peristalsis was noted bilaterally. Due to 

extensive dissection, Vistaseal

was sprayed in the pelvis over the vaginal cuff 

as well as bilateral vascular

pedicles and posterior cul-de-sac. Good 

hemostasis was confirmed. All

instruments were then removed. Robot was 

undocked. Pneumoperitoneum was

evacuated. Trocars removed. Skin was closed using 

4-0 Vicryl subcuticular

stitch in all port sites and sealed with 

Dermabond. A 0.25% Marcaine was

injected in all port sites. Hussein catheter was 

removed. Speculum exam of

vagina revealed that the cuff was intact and 

hemostatic. No vaginal lacerations

were noted. The patient was removed from dorsal 

lithotomy position, awoken from

general anesthesia without difficulty. The 

patient tolerated the procedure

well.



Throughout the case, Mitul Esquivel acted as 

first assistant. He provided

protection and retraction. He performed suction 

for visualization. He

participated in the positioning of the patient as 

well as the closure of the

wounds. Without his assistance, the surgery could 

not have been performed

safely or to adequate accepted medical standards. 

Therefore his assistance was

considered medically indicated and necessary.



Dictated By: Abril Wood MD



Date Dictated: 2024 08:51:24



PATIENT NAME: REYES,MARCI KAYE ACCOUNT #: 

[id number]







Date Transcribed: 2024 09:37:34

NNT/ENRIQUE

Job #: [id number]

Receipt ID: [id number]



Authenticated and Edited by Abril Wood MD 

On 3/31/24 8:23:19 PM











Electronically Signed by Abril Wood MD on 

24 at 0824

























































































PATIENT NAME: REYES,MARCI KAYE ACCOUNT #: 

[id number]                                        [facility]

 

                                        2024 08:31:00 

[facility] ([facility])

Gynecology Post Prog Note

REPORT#:[id number] REPORT STATUS: Signed

REPORT INITIALIZATION DATE:24 TIME: 831



PATIENT: REYES,MARCI KAYE UNIT #: [id number]

ACCOUNT#: [id number] ROOM/BED: [facility]

: [birth year] AGE: 43 SEX: F ATTEND: Abril Wood MD

ADM DT: 24 AUTHOR: Abril Wood MD

REPT SERVICE DT/TIME: 24 0831

* ALL edits or amendments must be made on the 

electronic/computer document *





General

Post-op: day 1

Status post:

RATLHBS, EOE, REKHA



Subjective

Comments:

Pt doing well. Jeffry reg diet. Voiding well. Pain 

controlled.



Objective



General

VS/I O:

Last Documented:

Result Date Time

Pulse Ox 96  0446

B/P 130/83  0446

B/P Mean 98.9  0446

O2 Delivery Room air  044

Temp 98.4  0446

Pulse 98  044

Resp 17  044

O2 Flow Rate 10  1415



24 hour I O ending at 0700:

 0700  1900

Intake Total 500.00 2090.00

Output Total 400 700

Balance 100.00 1390.00



Intake, .00 1650.00

Intake, Oral 440

Output, 100

Estimated

Blood Loss

Output, Urine 400 600

Patient 99.1 kg

Weight

Weight  Standing scale

Measurement

Method



PATIENT WEIGHT:



Weight (lb): 218

Weight (oz): 7.65

Weight (kg): 99.100





Physical Exam

General appearance: alert, awake

Wound/incision:

Location:

abd

Site condition: edges approximated, incision 

intact

Abdomen: normal bowel sounds, soft, no distention



Results

Findings/Data:

Laboratory Tests



0400

Hematology

WBC (6.5 - 12.3 K/mm3) 13.4 H

RBC (3.51 - 4.69 M/mm3) 3.72

Hgb (10.1 - 13.8 g/dL) 9.9 L

Hct (32.5 - 41.8 %) 31.5 L

MCV (84.6 - 96.6 fL) 84.7

MCH (27.3 - 33.9 pg) 26.6 L

MCHC (32.0 - 34.2 gm/dL) 31.4 L

RDW (12.2 - 16.3 %) 13.3

Plt Count (134 - 363 K/mm3) 333

MPV (9.2 - 12.7 fL) 10.1

Neut % (Auto) (57.9 - 77.3 %) 78.2 H

Lymph % (Auto) (14.5 - 29.7 %) 14.4 L

Mono % (Auto) (3.6 - 10.2 %) 6.9

Eos % (Auto) (0.0 - 3.0 %) 0.0

Baso % (Auto) (0.1 - 0.9 %)  0.3

Neut # (Auto) (K/mm3) 10.5

Lymph # (Auto) (K/mm3) 1.9

Mono # (Auto) (K/mm3) 0.9

Eos # (Auto) (K/mm3) 0

Baso # (Auto) (K/mm3) 0.0









Diagnosis, Assessment Plan

Free Text A P:

Doing well. HD stable. Good U/O and GI fxn.



Plan: Cont reg diet

PO pain meds

D/C today

Precautions given





Electronically Signed by Abril Wood MD on 

24 at 0833



RPT #:[id number]

***END OF REPORT***



                                                    [facility]

 

                                        2024-03-15 16:14:00 [id number]  [place] [address]





PATIENT NAME: REYES,MARCI KAYE  ADMIT DATE:

ACCOUNT NO: [id number] ROOM NO:

MEDICAL RECORD NO: [id number] AGE: 43

SEX: F



ADMITTING PHYSICIAN:

ATTENDING PHYSICIAN: Abril Wood MD





Order:

[id number]

Test Reason : PRE- OP (2024)

Test Date/Time Stamp:

Fri Mar 15 2024 16:14:03

Blood Pressure : ***/*** mmHG

Vent. Rate : 099 BPM Atrial Rate : 099 BPM

P-R Int : 162 ms QRS Dur : 088 ms

QT Int : 354 ms P-R-T Axes : 040 -06 019 degrees

QTc Int : 454 ms



Normal sinus rhythm

Normal ECG

No previous ECGs available

Confirmed by MADDY KEITA MD ([id number]) on 

3/18/2024 6:07:33 PM



Referred By: DOES_NOT KNOW Confirmed by:MADDY KEITA MD











Electronically Signed by Maddy Keita MD on 

24 at 180











































PATIENT NAME: REYES,MARCI KAYE  ACCOUNT #: 

[id number]                                        [facility]

 

                                        2020 12:03:00 [id number] [place] [address]





PATIENT NAME: REYES,MARCI ADMIT DATE: 20

ACCOUNT NO: [id number] ROOM NO: [facility]

MEDICAL RECORD NO: [id number] AGE: 39

SEX: F



ADMITTING PHYSICIAN: French Andrea MD

ATTENDING PHYSICIAN: French Andrea MD





ADMISSION DATE: 2020

DISCHARGE DATE: 2020



ADMITTING DIAGNOSES: G4, P3 at 37 and 1/7th 

weeks; chronic hypertension;

superimposed preeclampsia; prior low transverse 

 section x3;

multiparity, desires permanent sterilization; Rh 

negative; iron deficiency

anemia; left ovarian cyst; and urinary tract 

infection.



DISCHARGE DIAGNOSES: G4, P3 at 37 and 1/7th 

weeks; chronic hypertension;

superimposed preeclampsia; prior low transverse 

 section x3;

multiparity, desires permanent sterilization; Rh 

negative; iron deficiency

anemia; left ovarian cyst; urinary tract 

infection; repeat low transverse

 section; and bilateral tubal ligation.



DISCHARGE MEDICATIONS: Norco 5, Motrin, prenatal 

vitamins, and labetalol 100 mg

p.o. b.i.d.



DISCHARGE INSTRUCTIONS: Pelvic rest, no heavy 

lifting, wound and fever

precautions, postpartum blues and depression 

precautions, and preeclampsia

precautions. Follow up with Dr. Andrea in 6 

weeks. Blood pressure checks at

home twice a day. Call for blood pressure over 

160/100 or symptoms.



HOSPITAL COURSE: Ms. Reyes was admitted on the 

 from the clinic at

which point she was having blood pressures in the 

150s over 110 at 37 and 1/7th

weeks. She had previously been scheduled for a 

repeat  on the  and underwent her  section and tubal 

ligation at the time of

admission. Per Dr. Curry's report, there was no 

left ovarian cyst present. Her

surgery was uncomplicated. On postoperative day 

#1, she was doing well with

adequate pain control and adequate blood pressure 

control on her labetalol. Her

hemoglobin and hematocrit preoperatively was 12.1 

and 38.7 and on postoperative

day #1, was 11.6 and 35.9. On postoperative day 

#2, her diet was advanced. Her

epidural was removed. She was started on oral 

pain medication. On

postoperative day #3, she was discharged home 

with the above noted prescriptions

and precautions. She was also given Macrodantin 

100 mg b.i.d. to continue for a

total of 7 days. This was cleared with the 

pediatrician.



Dictated By: French Andrea MD



WT: DS:MEL/BREE.KAYLEN

DD: 2020 12:03:56

DT: 2020 12:15:37

Conf#: [id number]/DID#: [id number]





PATIENT NAME: REYES,MARCI ACCOUNT #: [id number]









Authenticated by French Andrea MD On 2020 

12:47:03 PM











Electronically Signed by French Andrea MD on 

20 at 1247

































































































PATIENT NAME: REYES,MARCI ACCOUNT #: [id number]                           [facility]

 

                                        2020 08:36:00 

[facility] ([facility])

OB Postpart Progr Note

REPORT#:[id number] REPORT STATUS: Signed

DATE:20 TIME: 08



PATIENT: REYES,MARCI UNIT #: [id number]

ACCOUNT#: [id number] ROOM/BED: 2036-

: 82 AGE: 39 SEX: F ATTEND: French Andrea MD

ADM DT: 20 AUTHOR: French Andrea MD



* ALL edits or amendments must be made on the 

electronic/computer document *





Subjective



Subjective

Comments:

No complaints. Wants to go home. Eating. Voiding. 

Ambulating. Good pain control.

No headaches. DW patient circ care



Objective



Nursing Documentation Review

Nursing data:

The data set between the solid lines has been 

imported from nursing

documentation. Any exceptions have been noted 

below under Provider comments.

_________________________________________________

_______________________________



Feeding preference:

_________________________________________________

_______________________________



Provider comments on imported nursing data: []

_____________________________________________





General

VS:

Vital Signs

Date Temp Pulse Resp B/P B/P Mean Pulse Ox FiO2

/- 97.7-99.1  18 113-122/67-80 

90.0



Last Documented:

Result Date Time

B/P 118/73  035

Temp 98.5  0358

Pulse 88  0358

Resp 18  0358

B/P Mean 90.0  1610

Pulse Ox 92 / 2130



Patient Weight



Weight (lb): 248

Weight (oz):

Weight (kg): 112.491





Physical Exam

Incision site: well approximated edges, staples 

intact, dry, no drainage, no

inflammation

Fundus: firm, below the umbilicus, non-tender

Lower extremities:

Edema: none





Diagnosis, Assessment Plan



Diagnosis, Assessment Plan

Comments:

POD 3 Repeat LTCS/BTL, CHTN SI PE

Doing well

Continue labetolol

Rx NOrco 5, motrin

Continue macrodantin x 5 days

FU 6 weeks

Prec given



Electronically Signed by French Andrea MD on 

20 at 0838



RPT #:[id number]

***END OF REPORT***



                                                    [facility]

 

                                        2020 09:10:00 

[facility] ([facility])

OB Postpart Progr Note

REPORT#:[id number] REPORT STATUS: Signed

DATE:20 TIME: 0910



PATIENT: REYES,MARCI UNIT #: [id number]

ACCOUNT#: [id number] ROOM/BED: 2036-A

: 82 AGE: 39 SEX: F ATTEND: French Andrea MD

ADM DT: 20 AUTHOR: French Andrea MD



* ALL edits or amendments must be made on the 

electronic/computer document *





Subjective



Subjective

Comments:

When I walked in the room, patient states "I want 

to go home. I want my baby".

DW patient baby in NICU. She does not understand 

why. She is being communicated

with by Dr. Tiara Andrea and NICU staff but does 

not understand "saturation". I

dw patient reasons, ?s answered. Baby doing well 

and likely to be transferred to

general nursery today. She will want him 

circumcised.





Objective



Nursing Documentation Review

Nursing data:

The data set between the solid lines has been 

imported from nursing

documentation. Any exceptions have been noted 

below under Provider comments.

_________________________________________________

_______________________________



Feeding preference:

_________________________________________________

_______________________________



Provider comments on imported nursing data: []

_____________________________________________





General

VS:

Vital Signs

Date Temp Pulse Resp B/P  B/P Mean Pulse Ox FiO2

/- 97.6-99.1  18-20 102-121/66-75 

85.7-90.5



Last Documented:

Result Date Time

B/P 113/72  033

Temp 98.7  0339

Pulse 92  0339

Resp  18  0339

B/P Mean 85.7 / 0339

Pulse Ox 92 / 2130



Patient Weight



Weight (lb): 248

Weight (oz):

Weight (kg): 112.491





Physical Exam

Incision site: well approximated edges, staples 

intact, dry, no drainage, no

inflammation

Lower extremities:

Edema: 1+ pitting



Result

Findings/data:

Laboratory Tests



1355

Chemistry

Uric Acid (2.6 - 6.0 mg/dL) 4.7

AST (15 - 37 units/L) 33

ALT (12 - 78 units/L) 24

Lactate Dehydrogenase (81 - 234 units/L) 198



Laboratory Tests



0437 1355

Hematology

WBC (6.6 - 12.1 K/mm3) 15.5 H 8.1

RBC (3.45 - 5.01 M/mm3) 4.01 4.32

Hgb (10.7 - 13.9 g/dL) 11.6 12.1

Hct (32.1 - 42.1 %) 35.9 38.7

MCV (84.1 - 94.8 fL) 90 90

MCH (27 - 35 pg) 28.9 28.0

MCHC (32.2 - 34.1 gm/dL) 32.3 31.3 L

RDW (12.4 - 16.5 %) 19.0 H 19.4 H

Plt Count (133 - 385 K/mm3) 199 196

MPV (9.1 - 12.7 fl)  11.0 11.5

Neut % (Auto) (56.5 - 79.4 %) 85.4 H 72.7

Lymph % (Auto) (14.3 - 34.3 %) 8.2 L 18.1

Mono % (Auto) (5.1 - 10.4 %) 5.6 7.1

Eos % (Auto) (0.1 - 3.0 %) 0.1 1.0

Baso % (Auto) (0.1 - 1.0 %) 0.2 0.4

Neut # (Auto) (K/mm3) 13.2 5.9

Lymph # (Auto) (K/mm3) 1.3 1.5

Mono # (Auto) (K/mm3) 0.9 0.6

Eos # (Auto) (K/mm3) 0.01 0.08

Baso # (Auto) (K/mm3) 0.0 0.0



Laboratory Tests



1355

Serology

Treponema pallidum Ab (NONREACTIVE) NONREACTIVE

Hep Bs Antigen (NONREACTIVE) NONREACTIVE

Hepatitis C Antibody (NONREACTIVE) NONREACTIVE

Hep C Ab Signal/Cutoff (<0.80) 0.15

HIV 1 2 Antibody (NONREACTIVE) NONREACTIVE



Laboratory Tests



1355 1355

Urines

Urine Protein (NEGATIVE) NEGATIVE

Urine Glucose (UA) (NEGATIVE)  NEGATIVE

Urine Ketones (NEGATIVE) 1+

Ur Random Creatinine (mg/dL) 78.8

U Random Total Protein (mg/dL)  33.0

Protein/Creatinin Ratio (<200 mg/gcrea) 410.0 H









Diagnosis, Assessment Plan



Diagnosis, Assessment Plan

Comments:

POD 2 Repeat LTCS/BTL for superimposed pre 

eclampsia on CHTN

Doing well

Circ on baby planned

BP stable

Likely home tomorrow

Will need rx for meds

Continue macrodantin for 7 days. Will confirm no 

bilirubin elevations with

pediatrician.



Electronically Signed by French Andrea MD on 

20 at 0914



RPT #:[id number]

***END OF REPORT***



                                                    [facility]

 

                                        2020 08:52:00 

[facility] ([facility])

OB Postpart Progr Note

REPORT#:[id number] REPORT STATUS: Signed

DATE:20 TIME: 852



PATIENT: REYES,MARCI UNIT #: [id number]

ACCOUNT#: [id number] ROOM/BED: [address]

: 82 AGE: 39 SEX: F ATTEND: French Andrea MD

ADM DT: 20 AUTHOR: Bisi Ascencio MD



* ALL edits or amendments must be made on the 

electronic/computer document *





Subjective



Subjective

EGA weeks/days: 37 weeks

Status/Day: post operative, d1

Patient reports:

Patient reports:

Yes normal lochia, Yes pain management effective, 

No no complaints



Objective



Nursing Documentation Review

Nursing Data:

The data set between the solid lines has been 

imported from nursing

documentation. Any exceptions have been noted 

below under Provider comments.

_________________________________________________

_______________________________



Feeding preference:

_________________________________________________

_______________________________



Provider comments on imported nursing data: []

_____________________________________________





General

VS:

Vital Signs:

Date Time Temp Pulse Resp B/P B/P Pulse O2 O2 

Flow FiO2

Mean Ox Delivery Rate

02/04 0334 98.9 101 20 104/64

02/04 0334 99.0 101 20 104/64 77.1

02/03  98.6 101 20 130/84

02/03 2322 98.6 101 20 130/84 99.3

02/03 2130  130/78

02/03 2130 93 14 92

02/03 5 89 16 94

02/03  94.0

02/03  92 25 128/72 93

02/03  91.0

02/03 2030 95

02/03 2030 91 16 125/68  93

02/03  92 18 94

02/03  18

02/03  89.0

02/03  90 23 129/67  94

02/03 1945 89.0

02/03 1945 83 16 123/69 94

02/03 1931 96.0

02/03 1931  125/83

02/03 1931 90 18 97

02/03 1930 95 25 94

02/03 1915 81.0

02/03 1915 86 27 118/60 95

02/03 1901 90.0

02/03 1901 98.4 90 17 131/68 99

02/03 1900 92 32  94

02/03 1845 90.0

02/03 1845 82 19 119/71 95

02/03 1830 88.0

02/03 1830 83  17 119/68 95

02/03 1815 80.0

02/03 1815 83 18 115/59 95

02/03 1807 78.0

02/03 1807 98.6 82 17 106/57 96

02/03 1539 115.0

02/03 1539 86 149/91

02/03 1456 119.0

02/03 1456 90 162/93

02/03 1439 118.0

02/03 1439 92 158/92

02/03 1424 116.0

02/03 1424  84 154/93

02/03 1420 98.6 18

02/03 1324 107.0

02/03 1324 90 140/85



Patient Weight



Weight (lb): 248

Weight (oz):

Weight (kg): 112.491





Physical Exam

Abdomen: soft

Incision site: well approximated edges, staples 

intact, dry

Uterus: involution appropriate, non-tender

Fundus: below the umbilicus, non-tender

Lochia: normal



Result

Findings/Data:

Laboratory Tests:



0437 1355 1355

Chemistry

Uric Acid (2.6 - 6.0 mg/dL) 4.7

AST (15 - 37 units/L) 33

ALT (12 - 78 units/L) 24

Lactate Dehydrogenase (81 - 234 units/L) 198

Hematology

WBC (6.6 - 12.1 K/mm3) 15.5 H 8.1

RBC (3.45 - 5.01 M/mm3) 4.01  4.32

Hgb (10.7 - 13.9 g/dL) 11.6 12.1

Hct (32.1 - 42.1 %) 35.9 38.7

MCV (84.1 - 94.8 fL) 90 90

MCH (27 - 35 pg) 28.9 28.0

MCHC (32.2 - 34.1 gm/dL) 32.3 31.3 L

RDW (12.4 - 16.5 %) 19.0 H 19.4 H

Plt Count (133 - 385 K/mm3) 199 196

MPV (9.1 - 12.7 fl) 11.0 11.5

Neut % (Auto) (56.5 - 79.4 %) 85.4 H 72.7

Lymph % (Auto) (14.3 - 34.3 %)  8.2 L 18.1

Mono % (Auto) (5.1 - 10.4 %) 5.6 7.1

Eos % (Auto) (0.1 - 3.0 %) 0.1 1.0

Baso % (Auto) (0.1 - 1.0 %) 0.2 0.4

Neut # (Auto) (K/mm3) 13.2 5.9

Lymph # (Auto) (K/mm3)  1.3 1.5

Mono # (Auto) (K/mm3) 0.9 0.6

Eos # (Auto) (K/mm3) 0.01 0.08

Baso # (Auto) (K/mm3) 0.0 0.0

Serology

Treponema pallidum Ab (NONREACTIVE) NONREACTIVE

Hep Bs Antigen (NONREACTIVE) NONREACTIVE

Hepatitis C Antibody (NONREACTIVE) NONREACTIVE

Hep C Ab Signal/Cutoff (<0.80)  0.15

HIV 1 2 Antibody (NONREACTIVE) NONREACTIVE

Urines

Urine Protein (NEGATIVE) NEGATIVE

Urine Glucose (UA) (NEGATIVE) NEGATIVE

Urine Ketones (NEGATIVE) 1+

Ur Random Creatinine (mg/dL)  78.8

U Random Total Protein (mg/dL) 33.0

Protein/Creatinin Ratio (<200 mg/gcrea) 410.0 H







Diagnosis, Assessment Plan



Diagnosis, Assessment Plan

Problem List/A P:

1. UTI (urinary tract infection) in pregnancy in 

third trimester



Assessment: nml postpartum progress, chronic 

hypertension, pre-eclampsia

Plan: routine postpartum care

Plan discussed with: patient, spouse/partner



Electronically Signed by Bisi Ascencio MD on 

20 at 0854



RPT #:[id number]

***END OF REPORT***



                                                    [facility]

 

                                        2020 18:08:00 [id number] [facility]'

[place] [address]





PATIENT NAME: REYES,MARCI ADMIT DATE: 20

ACCOUNT NO: [id number] ROOM NO: [facility]

MEDICAL RECORD NO: [id number] AGE: 39

SEX: F



ADMITTING PHYSICIAN: French Andrea MD

ATTENDING PHYSICIAN: French Andrea MD





OPERATION DATE: 2020



PREOPERATIVE DIAGNOSES: Intrauterine pregnancy at 

37-1/2 weeks with chronic

hypertension, superimposed pregnancy-induced 

hypertension with some severe

features, but no proteinuria, multiparity with 

desire for permanent

sterilization.



POSTOPERATIVE DIAGNOSES: Intrauterine pregnancy 

at 37-1/2 weeks with chronic

hypertension, superimposed pregnancy-induced 

hypertension with some severe

features, but no proteinuria, multiparity with 

desire for permanent

sterilization.



OPERATIONS AND PROCEDURES: The patient underwent 

a repeat  section,

Pfannenstiel skin incision, low cervical 

transverse uterine incision, and

bilateral tubal sterilization.



SURGEON: Bisi Ascencio MD



ASSISTANT: Aaliyah Mckeon SA



ANESTHESIA: Combined epidural spinal per Dr. Cerda.



FINDINGS: Delivered a viable vigorous male infant 

at 1719 from the vertex

position. Copious clear fluid. Weight was 3760, 

Apgars were 8 and 8 for

color.Resolution previous ovarian cyst



ESTIMATED BLOOD LOSS: 700 mL.



COMPLICATIONS: None.



DETAILS OF PROCEDURE: The patient was taken to 

the operating room where she

received her epidural anesthetic per Dr. Cerda. She was placed in a supine

position on the operating table with a wedge on 

the right hip and prepped and

draped in standard fashion for  section. 

Prepped and draped in standard

fashion for  section. A time-out 

procedure was performed. After

verification of adequate anesthesia, a 

Pfannenstiel skin incision was made 2

fingerbreadths above the symphysis pubis 

utilizing a skin knife. Incision was

taken down the fascia utilizing electrocautery 

unit with hemostasis achieved

using electrocautery unit. The fascia was scored 

in the midline. Fascial

incision was extended bilaterally in a 

curvilinear fashion utilizing the Bryant

scissors. Fascia was removed from the underlying 

rectus muscle utilizing sharp

and blunt dissection, both superiorly and 

inferiorly. Rectus muscle was divided

in the midline. Peritoneum was entered bluntly 

and the incision was extended



PATIENT NAME: REYES,MARCI ACCOUNT #: [id number]







bluntly. Bladder blade was placed. Because of the 

patient's three previous

C-sections, an incision was made relatively high 

in the lower uterine segment.

The baby was encountered in the vertex position 

in clear fluid. The uterine

incision was extended bluntly. The baby was 

delivered utilizing a single blade

of a Brown's forceps as a lever after 

artificial rupture of membranes with a

clear fluid. Baby's head was delivered 

atraumatically. Nares and mouth were

suctioned. Cord was clamped and cut and the 

infant was shown to his parents and

handed to the nurse in attendance. Cord blood was 

obtained. Stem cells were

then collected for MD Cowart. Upon completion, 

the placenta was given to MD Cowart. The placenta was then manually 

extracted from the uterus. Uterus was

exteriorized and wrapped in a moist lap. The 

endometrial cavity was swabbed

utilizing a moist lap. Bladder blade was then 

placed. Uterine incision was

closed utilizing running suture of 0 Vicryl in a 

running-locking fashion.

Multiple figure-of-eight sutures were required 

for hemostasis. The area was

inspected for hemostasis, which was noted to be 

excellent. A moist lap was

placed across the lower uterine segment and the 

right tube was grasped utilizing

a Deon clamp. An avascular space was noted in 

the mesosalpinx. Incision was

made utilizing electrocautery. The 0 chromic was 

placed through the defect and

the tube was tied proximally and distally 

utilizing 0 chromic. Multiple ties

were made. The intervening segment of tube was 

resected and handed to the nurse

in attendance to be tagged as right tube. 

Procedure was repeated on the

opposite side. Uterus was then turned to the 

pelvic cavity, which was

irrigated and suctioned. A small amount of 

bleeding was noted from the

midpoint of the uterine incision and a 

figure-of-eight suture was placed with

excellent hemostasis. Both tubal sites were 

inspected for hemostasis, which

was found to be excellent. Peritoneum was grasped 

with 3 hemostats and closed

utilizing running suture of 2-0 Vicryl in a 

running fashion. Same suture was

used to loosely reapproximate the rectus muscle 

in the midline. Subfascial

area was irrigated, inspected, and hemostasis 

achieved using electrocautery

unit. Fascia was closed utilizing an 0 Vicryl 

suture ligature in a running

fashion. The [employer]'s [employer]'s were 

reapproximated utilizing 2-0 Vicryl

suture ligature. Skin was reapproximated 

utilizing skin staples and a dressing

was applied. Estimated blood loss was 700 mL. 

Mom, dad, and baby Eleno

tolerated the procedure well.



Dictated By: Bisi Ascencio MD



WT: OP:FDEBRA/WALE/KAYLEN

DD: 2020 18:08:36

DT: 2020 20:03:34

Conf#: [id number]/DID#: [id number]



Authenticated and Edited by Bisi Ascencio MD On 

20 8:55:41 AM











Electronically Signed by Bisi Ascencio MD on 

20 at 0858















PATIENT NAME: REYES,MARCI ACCOUNT #: [id number]                           [facility]

 

                                        2020 17:56:00 

[facility] ([facility])

OB Delivery Note

REPORT#:[id number] REPORT STATUS: Signed

DATE:20 TIME: 



PATIENT: REYES,MARCI UNIT #: [id number]

ACCOUNT#: [id number] ROOM/BED: [address]

: 82 AGE: 39 SEX: F ATTEND: French Andrea MD

ADM DT: 20 AUTHOR: Bisi Ascencio MD



* ALL edits or amendments must be made on the 

electronic/computer document *





OB Delivery



Pre-delivery

Meds prior to delivery: labetalol ancef

 evaluation at delivery: NRP certified 

personnel

Admission EGA (wks/days): 37 1/7 weeks

EGA at delivery (wks/days): 37 weeks



Steroids Prior to Delivery

Steroids prior to delivery: no, delivery on 

arrival



General

VS:

Last Documented:

Result Date Time

B/P Mean 115.0  1539

B/P 149/91  1539

Pulse 86  1539

Temp 98.6  1420

Resp 18  1420







Baby A Information

Baby A information

Delivery date: 20

Delivery time: 1719

Birth status: live born

Wt of baby (grams): 3760

Gender: male, Marshall



 Delivery

 section

Abdominal incision: Pfannenstiel

Primary indication: previous , Chronic 

htn with superimposed

preeclampsia

Priority: indicated (add on)

Antibiotic prior to incision: 1 dose

)(SCDs applied activated: Yes

Incision: low transverse

Hemorrhage: no

Uterine scar: intact

Consent: indication discussed, questions 

answered, pt consent to op delivery

Mother's condition: mother stable

Infant's condition: infant stable in room

Additional comments:

Surgeon Silva Norton

Repeat c/s BTL



Comp none



Blood Loss/Details

Blood loss at delivery: <1000 ml

EBL (ml's): 700



Electronically Signed by Bisi Ascencio MD on 

20 at 1800



RPT #:[id number]

***END OF REPORT***



                                                    [facility]

 

                                        2020 12:43:00 

[facility] ([facility])

OB Admission / H P

REPORT#:[id number] REPORT STATUS: Signed

DATE:20 TIME: 1243



PATIENT: REYES,MARCI  UNIT #: [id number]

ACCOUNT#: [id number] ROOM/BED:

: 82 AGE: 39 SEX: F ATTEND: French Andrea MD

ADM DT: 20 AUTHOR: French Andrea MD



* ALL edits or amendments must be made on the 

electronic/computer document *





OB Admission H P Hx

Chief complaint: elevated blood pressure

HPI:

38 yo WF  @ 37  weeks who was seen in 

the office today in [place]

with worsening BP. She has a h/o Chronic HTN and 

had been on Labetolol 100 mg

BID the entire pregnancy without problems. [place] 

[place] [facility] [place] and later placed on modified 

bedrest at home on . Since then,

her BPs have been up to 167/109. Today in the 

office her BP is 150/90 and 152/

110. She also reports decreased FM today. There 

is no HA, visual changes, RUQ

pain. No VB/LOF.



PNC: Dr. French Andrea

1. HTN-on meds before pregnancy off and on. 

Started on Labetolol 100mg BID on  @ 10 weeks and has remained on same dose 

throughout pregnancy. Took baby ASA

until 36 weeks

2. Prior C/S x 3 and MPDPS-scheduled for repeat 

c/section and bilateral

salpingectomy.

3. RH negative-s/p Rhogam @ 29  weeks

4. Third trimester anemia-placed on iron 2x/day

5. 4 cm left ovarian cyst seen on early scan but 

adnexa not seen on f/u USG. To

check left adnexa at the time of scheduled 

surgery

6. E. coli UTI (100,000) dx'd  and has not 

been treated yet. Sensitive to

all abx tested. [place] [facility] [place] [facility]



PNLabs: GBS positive, O negative, RI, HIV 

negative, HB neg, NR, GC/CH/Trich

negative, NIPT normal boy, CF/SMA/FX negative, 

One hour @ 13 1/7 weeks 137 and @

28 weeks was 128. H/H at 28 weeks 10.0/304.



POB: T C Section, Largest was 8#15oz girl in 

2009. No complications



GYN: Regular cycles. No abnormal pap smears. No 

STDs

Pregnancy history:

: 4

Term: 3

: 0

Abortus: 0

Living children: 3

Complications (prev preg): none

Previous : low uterine trans incis

Number of prev : 3

Indication for prior : arrest 

dilatation/descent

Current pregnancy:

EDC: 20

Admission EGA (wks/days): 37 1/7 weeks

EGA based on: LMP

Conditions of pregnancy: anemia, HTN-chron 

w/pre-eclampsia, kidney/bladder

infection

Labs:

Blood type: See HPI above

Past medical history: migraines, obesity, UTI, 

Migraines; HTN-dx'd .

Previously on losartan (with infrequent use); 

Vitamin D Deficiency; Obesity

Past surgical history:  Breast Implants; 

3/5/2002 c/s; 9/15/2006 c/s; 2009 c/s; 10/2018 Hysteroscopic removal of IUD; 

 Tonsillectomy; [place] [facility]

Social history: employed, , no alcohol 

use, no tobacco use, no drug use,

-Rick

Medications:

Home Medications:

IRON AG FUM/VIT C/FA/MV/CA THREONATE (FERREX 28) 

1 TAB PO DAILY

LABETALOL (TRANDATE) 100 MG PO BID

PNV WITH FE FUMARATE/FA (PRENATAL) 1 TAB PO DAILY





Allergies

Coded Allergies:

No Known Allergies (20)





Objective



General

VS:

249 lbs today in the office

BP in office 152/100, 150/90





Patient Weight



Weight (lb): 248

Weight (oz):

Weight (kg): 112.917287





Physical Exam

Abdomen: gravid, soft, no abnormal tenderness, no 

guarding, no rebound

tenderness

Pelvic exam:

Pelvis clinically adequate: cl/thick/high

Membranes:

Membranes: Intact

Lower extremities:

Edema: 1+ pitting



Diagnosis, Assessment Plan



Diagnosis, Assessment Plan

Problem List/A P:

1. UTI (urinary tract infection) in pregnancy in 

third trimester



Comments:

38 yo  at 37 1/7 weeks with prior c/s x 3 

and MPDPS.

CHTN with worsening BPs in the severe range.

Admit for deliery by Repeat LTCS/Bilateral 

Salpingectomy. I have dw Dr. Ascencio

Please check adnexa for left ovarian cyst seen on 

early USG (and adnexa not

visualized on fu scan).

Obesity

Having a boy and Dr. Andrea with do circ on 

Wednesday or Thursday

Pedi Dr. Tiara Andrea at [facility] then Dr. Stone

Breastfeeding planned

Continue labetolol 100 mg BID pp

Continue oral abx pp for UTI treatment

RH negative-s/p Rhogam on 

Flu shot done 11/10/19

TDAP done 19





Electronically Signed by French Andrea MD on 

20 at 1356



RPT #:[id number]

***END OF REPORT***



                                                    [facility]

 

                                        2020 16:45:00 [id number] [facility]

[facility]

[address]

[place], [place] [address]





PATIENT NAME: REYES,MARCI ADMIT DATE: 20

ACCOUNT NO: [id number] ROOM NO:

MEDICAL RECORD NO: [id number] AGE: 39

SEX: F



ADMITTING PHYSICIAN:

ATTENDING PHYSICIAN: French Andrea MD







TRIAGE EVALUATION: 2020



HISTORY OF PRESENT ILLNESS: The patient is a 

39-year-old G4, P3-0-0-3, with

last menstrual period in 2019, and estimated 

date of confinement 2020.

She presented at 35-5/7 weeks, complaining of 

increased blood pressures at work.

She states she noted that her fingers on her 

right hand had become numb and

purple.  A coworker suggested she check her blood 

pressure. She was using the

cuff in the office, not the usual cuff she uses 

at home and her pressure was

155/105. [facility]; however, all

blood pressures have been within normal limits 

and less than 140/90. Her

highest was 123/72 at arrival. Dr. Andrea gave 

orders for labs to be drawn and

asked that I see the patient in the 30-minute 

window for [facility], was notified at

1:53 and patient evaluation began at 2:14 p.m. 

She denies headache, vision

changes, right upper quadrant pain, or other 

preeclampsia symptoms. She denies

fever, chills, diarrhea, constipation or dysuria. 

She does report some nausea

earlier prior to arrival but no vomiting. She 

denies vaginal bleeding or

leakage of fluid. She reports occasional 

contractions every 1 to 2 hours and

reports good fetal movement. She does have 

chronic hypertension and has been

taking her labetalol as instructed with good 

control of her pressures on home

monitoring and in clinic previously. Her prenatal 

care has been with Dr. Andrea. Her next visit is scheduled for 

2020. Her care began at

approximately 9 weeks' gestation in this 

pregnancy. She states other than the

chronic hypertension well controlled with 

labetalol, she has had no

complications during the pregnancy and all labs 

and ultrasounds have been

normal.



PAST MEDICAL HISTORY: Chronic hypertension 

diagnosed in , controlled with

losartan prior to the pregnancy, although she did 

stop it prior to conception

and since pregnancy, she has used labetalol.



PAST SURGICAL HISTORY:  section x3. Also, 

breast augmentation in 

and tonsils in  and wisdom teeth in .



ALLERGIES: NO KNOWN DRUG ALLERGIES.



MEDICATIONS: Prenatal vitamins, baby aspirin 

daily, iron 2 tablets at one time

daily and labetalol 100 mg twice daily.



OBSTETRICAL HISTORY: In , full term  

section for failure to



progress, delivered of a female infant weighing 8 

pounds 12 ounces. In ,

full-term  section without trial of 

labor, delivered of a female infant



PATIENT NAME: REYES,MARCI ACCOUNT #: [id number]







weighing 7 pounds 15 ounces. In , full term 

 section, without trial

of labor, delivered of a female infant weighing 8 

pounds 15 ounces. The patient

denies complications in any of the pregnancies.



GYNECOLOGIC HISTORY: Menarche at age 15 with 

regular monthly cycles lasting 7

days, moderate in amount with minimal cramping. 

She denies prior history of

STDs and reports all Pap smears normal.



SOCIAL HISTORY: The patient denies tobacco or 

illicit drug use. She reports

occasional prepregnancy alcohol use. She lives 

with her spouse, a 39-year-old

male with hypertension and her 3 daughters.  She 

works as a District  for

[employer] in [place]. She states her job is 

stressful, working in the

[employer] for child support.



FAMILY HISTORY: Parents both with hypertension. 

Maternal grandmother with

history of breast cancer and the uterine cancer. 

Maternal grandfather with

heart disease. Paternal grandmother  with 

lung cancer, she was a

smoker, she also had hypertension. Paternal 

grandfather  with unknown

type of cancer, he was also a smoker. The patient 

has 1 sister living and

healthy and her 3 daughters, the youngest with 

asthma. Otherwise, the older two

are healthy. The patient reports her 's 

uncle has Down syndrome. He

does not think his grandmother was over 35 at the 

time of delivery. Otherwise,

no mental retardation or birth defects in either 

family. The patient did have

chromosomal testing earlier in the pregnancy and 

reports it was negative.



REVIEW OF SYSTEMS: A 10-point review of systems 

is negative except as stated

above.



PHYSICAL EXAMINATION:

GENERAL: The patient is a well-nourished, 

well-developed white female, in no

acute distress, lying on triage stretcher in 

[place].

VITAL SIGNS: Height 5 feet 1 inches, weight prior 

to the pregnancy 180 pounds

and at most recent visit to clinic 240 pounds, 

blood pressure 123/72, pulse 87,

respirations 18, and temperature 99.1.

HEAD AND NECK: Within normal limits without 

lymphadenopathy or thyromegaly.

CHEST: Clear to auscultation bilaterally with 

regular rate and rhythm.

BREASTS: Deferred.

ABDOMEN: Soft, nontender, gravid with a fundal 

height of 37 cm. No pelvic exam

was performed as the patient denied contractions 

and none were seen on ____.

EXTREMITIES: Show 1+ edema to mid calf. Bilateral 

patellar reflexes 2+.

NEUROLOGIC: Nonfocal. The patient is awake, 

alert, and oriented x3.



DIAGNOSTIC DATA: NST is category 1 with baseline 

fetal heart tones in the 130s

with multiple 15 x 15 accelerations, no decels, 

and no contractions noted.



LABORATORY DATA: Labs ordered by Dr. Andrea 

included white blood cell count

of 8.9, hemoglobin 11.8, hematocrit 37.5, and 

platelets 191,000. BUN 9,

creatinine 0.6, ALT 21, AST 21, uric acid 4.7, 

and . Urinalysis with 2+

ketones, positive for nitrites, otherwise 

negative with 3-5 rbc's, 6-10 wbc's,

few epithelial cells, and moderate amount of 

bacteria consistent with urinary

tract infection. Urine culture has been sent, but 

results will not be available

for approximately 48 hours.



ASSESSMENT AND PLAN: This is a 39-year-old G4, P3 

at 35-5/7 weeks with history

of chronic hypertension, normally well controlled 

on labetalol, but with



PATIENT NAME: REYES,MARCI ACCOUNT #: [id number]







reported elevated blood pressure at work earlier 

today. On workup in [place] [facility], all

blood pressures are normal and no evidence of 

superimposed preeclampsia on her

labs. The patient does report excess maternal 

weight gain. She is now noted

to have a urinary tract infection and ketonuria. 

She is discharged home in

stable condition with reassuring fetal status. 

She is encouraged to drink at

least 100 ounces of water daily and eat frequent 

small healthy meals. At work,

she should avoid sitting for prolonged periods of 

time and when she is sitting,

she should prop her feet and walk around the bed 

every few hours. She is given

a script for Macrobid for the urinary tract 

infection. She is to start her

script today and take one pill every 12 hours 

until finished. She is encouraged

to perform kick counts nightly. Instructions and 

handout are given. The above

information was discussed with Dr. Andrea and she 

agreed with the plan.



Dictated By: Amy Krause MD



WT: [id number]:[id number]/[email or web address]

DD: 2020 16:45:13

DT: 2020 17:32:06

Conf#: [id number]/DID#: [id number]

Authenticated and Edited by Amy Krause MD On 

20 7:58:29 AM











Electronically Signed by Amy Krause MD on 

20 at 0800





























































PATIENT NAME: REYES,MARCI ACCOUNT #: [id number]                           [facility]

## 2024-10-13 NOTE — ER
Nurse's Notes                                                                                     

 [facility]                                                                 

Name: Marci Reyes                                                                                 

Age: 44 yrs                                                                                       

Sex: Female                                                                                       

: 1980                                                                                   

MRN: [id number]                                                                                   

Arrival Date: 10/13/2024                                                                          

Time: 08:07                                                                                       

Account#: [id number]                                                                            

Bed 19                                                                                            

Private MD:                                                                                       

Diagnosis: UTI/ Urinary tract infection, site not specified;Hypokalemia                           

                                                                                                  

Presentation:                                                                                     

10/13                                                                                             

08:21 Coronavirus screen: Client denies travel out of the [place] in the last 14 days. At this   ll1 

      time, the client does not indicate any symptoms associated with coronavirus-19. Ebola       

      Screen: Patient denies travel to an Ebola-affected area in the 21 days before illness       

      onset. Initial Sepsis Screen: Does the patient meet any 2 criteria? No. Patient's           

      initial sepsis screen is negative. Does the patient have a suspected source of              

      infection? No. Patient's initial sepsis screen is negative. Risk Assessment: Do you         

      want to hurt yourself or someone else? Patient reports no desire to harm self or            

      others. Onset of symptoms was October 3, 2024.                                              

08:21 Method Of Arrival: Ambulatory                                                           ll1 

08:21 Acuity: GINGER 3                                                                           ll1 

08:24 Chief complaint: Patient states: Dysuria, frequency, nausea, HA, back pain for 7-10     ll1 

      days. No fever.                                                                             

                                                                                                  

Triage Assessment:                                                                                

08:15 General: Appears uncomfortable, Behavior is calm, cooperative, appropriate for age.     ll1 

      Pain: Complains of pain in back Pain currently is 8 out of 10 on a pain scale. Quality      

      of pain is described as aching. Neuro: Reports headache. GI: Reports nausea. :            

      Reports burning with urination, urinary frequency, oliguria. Musculoskeletal: Reports       

      pain in back.                                                                               

                                                                                                  

Historical:                                                                                       

- Allergies:                                                                                      

08:20 No Known Allergies;                                                                     ll1 

- PMHx:                                                                                           

08:20 Hypertension;                                                                           ll1 

- PSHx:                                                                                           

08:20  section; hysterectomy;                                                         ll1 

                                                                                                  

- Immunization history:: Adult Immunizations up to date.                                          

- Infectious Disease History:: Denies.                                                            

- Social history:: Smoking status: Patient denies any tobacco usage or history of.                

                                                                                                  

                                                                                                  

Screenin:54 [facility] ED Fall Risk Assessment (Adult) History of falling in the last 3 months,       kc6 

      including since admission No falls in past 3 months (0 pts) Confusion or Disorientation     

      No (0 pts) Intoxicated or Sedated No (0 pts) Impaired Gait No (0 pts) Mobility Assist       

      Device Used No (0 pt) Altered Elimination No (0 pt) Score/Fall Risk Level 0 - 2 = Low       

      Risk Oriented to surroundings. Abuse screen: Denies threats or abuse. Denies injuries       

      from another. Nutritional screening: No deficits noted. Tuberculosis screening: No          

      symptoms or risk factors identified.                                                        

                                                                                                  

Assessment:                                                                                       

08:54 General: Appears in no apparent distress. uncomfortable, well groomed, well developed,  kc6 

      Behavior is calm, cooperative, appropriate for age. Pain: Complains of pain in back.        

      Neuro: Level of Consciousness is awake, alert, obeys commands, Oriented to person,          

      place, time, situation, Appropriate for age. Cardiovascular: Capillary refill < 3           

      seconds. Respiratory: Airway is patent Trachea midline Respiratory effort is even,          

      unlabored, Respiratory pattern is regular, symmetrical. GI: Reports nausea, Patient         

      currently denies abdominal pain, diarrhea, vomiting. : Urine is blood tinged, Reports     

      burning with urination, cramping. EENT: No signs and/or symptoms were reported              

      regarding the EENT system. Derm: No signs and/or symptoms reported regarding the            

      dermatologic system. Skin is intact, is healthy with good turgor, Skin is pink, warm \T\    

      dry. Musculoskeletal: No signs and/or symptoms reported regarding the musculoskeletal       

      system. Circulation, motion, and sensation intact. Capillary refill < 3 seconds, Range      

      of motion: intact in all extremities.                                                       

09:33 Reassessment: Patient appears in no apparent distress at this time. No changes from     kc6 

      previously documented assessment. Patient and/or family updated on plan of care and         

      expected duration. Pain level reassessed. Patient is alert, oriented x 3, equal             

      unlabored respirations, skin warm/dry/pink. Patient states feeling better. Patient          

      states symptoms have improved.                                                              

                                                                                                  

Vital Signs:                                                                                      

08:21  / 106; Pulse 103; Resp 18; Temp 98.4; Pulse Ox 93% on R/A; Weight 83.91 kg;      ll1 

      Height 5 ft. 1 in. ; Pain 8/10;                                                             

09:14  / 87; Pulse 94; Resp 18 S; Pulse Ox 95% on R/A; Pain 0/10;                       kc6 

09:22  / 87; Pulse 89;                                                                  ec2 

08:21 Body Mass Index 34.96 (83.91 kg, 154.94 cm)                                             ll1 

08:21 Pain Scale: Adult                                                                       ll1 

09:14 Pain Scale: Adult                                                                       kc6 

                                                                                                  

ED Course:                                                                                        

08:10 Patient arrived in ED.                                                                  mg5 

08:10 Akin De La Torre MD is Attending Physician.                                               ec2 

08:14 Arm band placed on Patient placed in an exam room, on a stretcher.                      ll1 

08:21 Triage completed.                                                                       ll1 

08:26 Dee Barnes, RN is Primary Nurse.                                                 kc6 

08:41 Inserted saline lock: 20 gauge in right antecubital area, using aseptic technique.      kc6 

      Blood collected. Flushed with 10 mL NS. Patient maintains SpO2 saturation greater than      

      95% on room air.                                                                            

08:53 Patient has correct armband on for positive identification. Bed in low position. Call   kc6 

      light in reach. Side rails up X 1. Adult w/ patient. Pulse ox on. NIBP on. Door closed.     

      Noise minimized. Lights dimmed. Pillow given.                                               

09:33 No provider procedures requiring assistance completed. IV discontinued, intact,         kc6 

      bleeding controlled, No redness/swelling at site. Pressure dressing applied.                

                                                                                                  

Administered Medications:                                                                         

08:41 Drug: TORadol - Ketorolac IVP 15 mg IVP once Route: IVP; Site: right antecubital;       kc6 

09:14 Follow up: Response: No adverse reaction; Pain is decreased                             kc6 

08:41 Drug: Ondansetron IVP 4 mg IVP once; over 2 minutes Route: IVP; Site: right antecubital;kc6 

09:14 Follow up: Response: No adverse reaction; Nausea is decreased                           kc6 

08:41 Drug: NS 0.9% IV 1000 ml IV at 1 bolus Per protocol; to be given as a bolus over 60     kc6 

      minutes Route: IV; Rate: 1 bolus; Site: right antecubital;                                  

09:33 Follow up: Response: No adverse reaction; IV Status: Completed infusion; IV Intake:     kc6 

      1000ml                                                                                      

09:33 Drug: Trimethoprim-Sulfamethoxazole PO (160 mg-800 mg (DS) 1 tablet PO once Route: PO;  kc6 

09:33 Follow up: Response: No adverse reaction                                                kc6 

                                                                                                  

                                                                                                  

Medication:                                                                                       

09:34 VIS not applicable for this client.                                                     kc6 

                                                                                                  

Intake:                                                                                           

09:33 IV: 1000ml; Total: 1000ml.                                                              kc6 

                                                                                                  

Outcome:                                                                                          

09:22 Discharge ordered by MD.                                                                ec2 

09:33 Discharged to home ambulatory, with significant other,                                  kc6 

09:33 Condition: improved                                                                         

09:33 Discharge instructions given to patient, significant other, Instructed on discharge         

      instructions, follow up and referral plans. medication usage, Demonstrated                  

      understanding of instructions, follow-up care, medications, Prescriptions given X 2,        

09:34 Patient left the ED.                                                                    kc6 

                                                                                                  

Signatures:                                                                                       

Celine Pal RN                       RN   ll1                                                  

Dee Barnes RN                   RN   kc6                                                  

Chel Camilo                             5                                                  

Akin De La Torre MD MD   ec2                                                  

                                                                                                  

**************************************************************************************************

## 2024-10-13 NOTE — EDPHYS
Physician Documentation                                                                           

 [facility]                                                                 

Name: Marci Reyes                                                                                 

Age: 44 yrs                                                                                       

Sex: Female                                                                                       

: 1980                                                                                   

MRN: [id number]                                                                                   

Arrival Date: 10/13/2024                                                                          

Time: 08:07                                                                                       

Account#: [id number]                                                                            

Bed 19                                                                                            

Private MD:                                                                                       

ED Physician Akin De La Torre                                                                        

HPI:                                                                                              

10/13                                                                                             

08:35 This 44 yrs old  Female presents to ER via Ambulatory with complaints of       ec2 

      Urinary Problem.                                                                            

08:35 Patient arrives today for evaluation of dysuria as well as increased urinary frequency, ec2 

      generalized body pain as well as feeling unwell. Patient reports associated nausea, no      

      vomiting, no diarrhea. Patient reports that she is taking Azo as well as Tylenol for        

      symptoms. Reports some improvement.                                                         

                                                                                                  

Historical:                                                                                       

- Allergies:                                                                                      

08:20 No Known Allergies;                                                                     ll1 

- PMHx:                                                                                           

08:20 Hypertension;                                                                           ll1 

- PSHx:                                                                                           

08:20  section; hysterectomy;                                                         ll1 

                                                                                                  

- Immunization history:: Adult Immunizations up to date.                                          

- Infectious Disease History:: Denies.                                                            

- Social history:: Smoking status: Patient denies any tobacco usage or history of.                

                                                                                                  

                                                                                                  

ROS:                                                                                              

08:35 Constitutional: as per hpi                                                              ec2 

                                                                                                  

Exam:                                                                                             

08:35 Constitutional:  GEN: NAD                                                                   

Head: atraumatic                                                                                  

Eyes: EOMI                                                                                        

Ears: External ears are          ec2                                                              

      normal.                                                                                     

CV: Tachycardia                                                                                   

LUNGS: no respiratory distress                                                                    

ABD: non-distended                                                                                

SKIN: no                                                                                          

      evidence of rashes                                                                          

MSK: no evidence of trauma                                                                        

                                                                                                  

Vital Signs:                                                                                      

08:21  / 106; Pulse 103; Resp 18; Temp 98.4; Pulse Ox 93% on R/A; Weight 83.91 kg;      ll1 

      Height 5 ft. 1 in. ; Pain 8/10;                                                             

09:14  / 87; Pulse 94; Resp 18 S; Pulse Ox 95% on R/A; Pain 0/10;                       kc6 

09:22  / 87; Pulse 89;                                                                  ec2 

08:21 Body Mass Index 34.96 (83.91 kg, 154.94 cm)                                             ll1 

08:21 Pain Scale: Adult                                                                       ll1 

09:14 Pain Scale: Adult                                                                       kc6 

                                                                                                  

MDM:                                                                                              

08:35 Data reviewed: vital signs. ED course: Patient arrives today for evaluation of urinary  ec2 

      complaints. Examination remarkable for slightly tachycardic individuals otherwise in no     

      acute distress with a reassuring examination. Will obtain lab work as well as urine         

      studies..                                                                                   

09:22 ED course: Metabolic profile shows slight hypokalemia. Urine with bacteria as well as   ec2 

      WBC clumps. Lipase within normal ranges. Presentation consistent with UTI. Will             

      discharge home. Some antibiotics. Return precautions given. .                               

09:22 Patient medically screened.                                                             ec2 

                                                                                                  

10/13                                                                                             

08:16 Order name: CBC with Diff; Complete Time: 08:57                                         ec2 

10/13                                                                                             

08:16 Order name: CMP; Complete Time: 09:21                                                   ec2 

10/13                                                                                             

08:16 Order name: Lipase; Complete Time: 09:21                                                ec2 

10/13                                                                                             

08:16 Order name: Pregnancy Test, Urine; Complete Time: 08:57                                 ec2 

10/13                                                                                             

08:47 Order name: Urine Microscopic Only; Complete Time: 09:21                                EDMS

10/13                                                                                             

09:04 Order name: Urine Culture                                                               EDMS

10/13                                                                                             

08:16 Order name: IV Saline Lock; Complete Time: 08:41                                        ec2 

10/13                                                                                             

08:16 Order name: Labs collected and sent; Complete Time: 08:41                               ec2 

                                                                                                  

Administered Medications:                                                                         

08:41 Drug: TORadol - Ketorolac IVP 15 mg IVP once Route: IVP; Site: right antecubital;       kc6 

09:14 Follow up: Response: No adverse reaction; Pain is decreased                             kc6 

08:41 Drug: Ondansetron IVP 4 mg IVP once; over 2 minutes Route: IVP; Site: right antecubital;kc6 

09:14 Follow up: Response: No adverse reaction; Nausea is decreased                           kc6 

08:41 Drug: NS 0.9% IV 1000 ml IV at 1 bolus Per protocol; to be given as a bolus over 60     kc6 

      minutes Route: IV; Rate: 1 bolus; Site: right antecubital;                                  

09:33 Follow up: Response: No adverse reaction; IV Status: Completed infusion; IV Intake:     kc6 

      1000ml                                                                                      

09:33 Drug: Trimethoprim-Sulfamethoxazole PO (160 mg-800 mg (DS) 1 tablet PO once Route: PO;  kc6 

09:33 Follow up: Response: No adverse reaction                                                kc6 

                                                                                                  

                                                                                                  

Disposition Summary:                                                                              

10/13/24 09:22                                                                                    

Discharge Ordered                                                                                 

 Notes:       Location: Home                                                                        
  ec2

      Condition: Stable                                                                       ec2 

      Diagnosis                                                                                   

        - UTI/ Urinary tract infection, site not specified                                    ec2 

        - Hypokalemia                                                                         ec2 

      Followup:                                                                               ec2 

        - With: Private Physician                                                                  

        - When:                                                                                    

        - Reason: Re-evaluation by your physician                                                  

      Discharge Instructions:                                                                     

        - Discharge Summary Sheet                                                             ec2 

        - Urinary Tract Infection, Adult                                                      ec2 

      Forms:                                                                                      

        - Medication Reconciliation Form                                                      ec2 

        - Antibiotic Education                                                                ec2 

        - Prescription Opioid Use                                                             ec2 

        - Patient Portal Instructions                                                         ec2 

        - Leadership Thank You Letter                                                         ec2 

      Prescriptions:                                                                              

        - Zofran 4 mg Oral Tablet                                                                  

            - take 1 tablet ORAL route every 12 hours As needed; 20 tablet; Refills: 0,       ec2 

      Product Selection Permitted                                                                 

        - Bactrim -160 mg Oral Tablet                                                        

            - take 1 tablet ORAL route every 12 hours for 7 days; 14 tablet; Refills: 0,      ec2 

      Product Selection Permitted                                                                 

Signatures:                                                                                       

Dispatcher [employer]                           Celine Weaver RN                       RN   ll1                                                  

Dee Branes RN                   RN   kc6                                                  

Akin De La Torre MD MD   ec2                                                  

                                                                                                  

Corrections: (The following items were deleted from the chart)                                    

08:47 08:16 Urinalysis+U.LAB.BRZ ordered. WILDER HDZ

                                                                                                  

**************************************************************************************************

## 2024-12-04 NOTE — EDPHYS
Physician Documentation                                                                           

 [facility]                                                                 

Name: Marci Reyes                                                                                 

Age: 44 yrs                                                                                       

Sex: Female                                                                                       

: 1980                                                                                   

MRN: [id number]                                                                                   

Arrival Date: 2024                                                                          

Time: 18:21                                                                                       

Account#: [id number]                                                                            

Bed IW3                                                                                           

Private MD:                                                                                       

ED Physician Rayray Cowart                                                                      

HPI:                                                                                              

                                                                                             

18:27 This 44 yrs old Female presents to ER via Un[facility] with complaints of Poison Ivy.     kb  

18:27 Pt is a 44 year old female who presents for rash and swelling after contact with poison kb  

      ivy on . States rash is spreading and getting worse so that is what made her come     

      in today. Denies shortness of breath.                                                       

                                                                                                  

OB/GYN:                                                                                           

18:31 LMP N/A - Hysterectomy, Not pregnant                                                    jb4 

                                                                                                  

Historical:                                                                                       

- Allergies:                                                                                      

18:30 No Known Allergies;                                                                     jb4 

- PMHx:                                                                                           

18:30 Hypertension;                                                                           jb4 

- PSHx:                                                                                           

18:30  section; hysterectomy;                                                         jb4 

                                                                                                  

- Immunization history:: Adult Immunizations unknown.                                             

- Infectious Disease History:: Denies.                                                            

- Social history:: Smoking status: Patient denies any tobacco usage or history of.                

                                                                                                  

                                                                                                  

ROS:                                                                                              

18:29 Constitutional: As per HPI                                                              kb  

                                                                                                  

Exam:                                                                                             

18:29 Constitutional:  This is a well developed, well nourished patient who is awake, alert,  kb  

      and in no acute distress. Head/Face:  Normocephalic, atraumatic. ENT:  Moist Mucous         

      membranes Cardiovascular:  Regular rate  Respiratory:  Respirations even and unlabored.     

      No increased work of breathing. Talking in full sentences MS/ Extremity:  Pulses equal,     

      no cyanosis.  Neurovascular intact.  Full, normal range of motion. Neuro:  Awake and        

      alert, GCS 15, oriented to person, place, time, and situation.                              

18:29 Skin: consistent with  contact dermatitis, on the face, right arm, left arm, left leg       

      and neck,                                                                                   

                                                                                                  

Vital Signs:                                                                                      

18:31  / 106; Pulse 98; Resp 19; Temp 98.7(O); Pulse Ox 98% on R/A; Weight 83.91 kg     jb4 

      (R); Height 5 ft. 1 in. (R);                                                                

18:31 Body Mass Index 34.96 (83.91 kg, 154.94 cm)                                             jb4 

                                                                                                  

MDM:                                                                                              

18:25 Medical Screening Exam initiated                                                        kb  

18:29 Differential diagnosis: contact dermatitis, parasitic infection. Data reviewed: vital   kb  

      signs, nurses notes. Counseling: I had a detailed discussion with the patient and/or        

      guardian regarding the historical points, exam findings, and any diagnostic results         

      supporting the discharge/admit diagnosis, the need for outpatient follow up, a family       

      practitioner, to return to the emergency department if symptoms worsen or persist or if     

      there are any questions or concerns that arise at home.                                     

                                                                                                  

Administered Medications:                                                                         

18:43 Drug: Dexamethasone IM 10 mg IM once Route: IM; Site: right deltoid;                    jb4 

19:10 Follow up: Response: No adverse reaction; Marked relief of symptoms                     jb4 

18:43 Drug: Famotidine PO 20 mg PO once Route: PO;                                            jb4 

19:10 Follow up: Response: No adverse reaction; Marked relief of symptoms                     jb4 

                                                                                                  

                                                                                                  

Disposition Summary:                                                                              

24 18:46                                                                                    

Discharge Ordered                                                                                 

 Notes:       Location: Home                                                                        
  kb

      Condition: Stable                                                                       kb  

      Diagnosis                                                                                   

        - Allergic contact dermatitis due to plants, except food                              kb  

      Followup:                                                                               kb  

        - With: Emergency Department                                                               

        - When: As needed                                                                          

        - Reason: Worsening of condition                                                           

      Followup:                                                                               kb  

        - With: Private Physician                                                                  

        - When: 2 - 3 days                                                                         

        - Reason: Recheck today's complaints, Continuance of care, Re-evaluation by your           

      physician                                                                                   

      Discharge Instructions:                                                                     

        - Discharge Summary Sheet                                                             kb  

        - Poison Ivy Dermatitis, Easy-to-Read                                                 kb  

      Forms:                                                                                      

        - Medication Reconciliation Form                                                      kb  

        - Antibiotic Education                                                                kb  

        - Prescription Opioid Use                                                             kb  

        - Patient Portal Instructions                                                         kb  

        - Leadership Thank You Letter                                                         kb  

      Prescriptions:                                                                              

        - Pepcid 20 mg Oral Tablet                                                                 

            - take 1 tablet ORAL route every 12 hours for 5 days; 10 tablet; Refills: 0,      kb  

      Product Selection Permitted                                                                 

        - Prednisone 20 mg Oral Tablet                                                             

            - take 1 tablet ORAL route once daily for 5 days; 5 tablet; Refills: 0, Product   kb  

      Selection Permitted                                                                         

Signatures:                                                                                       

Helga Larkin, SOFIEC                 FNP-Osmani Sagastume, RN                       RN   jb4                                                  

                                                                                                  

**************************************************************************************************

## 2024-12-04 NOTE — ER
Nurse's Notes                                                                                     

 [facility]                                                                 

Name: Marci Reyes                                                                                 

Age: 44 yrs                                                                                       

Sex: Female                                                                                       

: 1980                                                                                   

MRN: [id number]                                                                                   

Arrival Date: 2024                                                                          

Time: 18:21                                                                                       

Account#: [id number]                                                                            

Bed IW3                                                                                           

Private MD:                                                                                       

Diagnosis: Allergic contact dermatitis due to plants, except food                                 

                                                                                                  

Presentation:                                                                                     

                                                                                             

18:31 Chief complaint: Patient states: I was doing yard work and got into poison IVY. I have  jb4 

      a rash on my face, arms, legs, and neck. I have been taking Benadryl at home with no        

      relief. Coronavirus screen: At this time, the client does not indicate any symptoms         

      associated with coronavirus-19. Ebola Screen: No symptoms or risks identified at this       

      time. Initial Sepsis Screen: Does the patient meet any 2 criteria? HR > 90 bpm. Yes         

      Does the patient have a suspected source of infection? No. Patient's initial sepsis         

      screen is negative. Risk Assessment: Do you want to hurt yourself or someone else?          

      Patient reports no desire to harm self or others. Onset of symptoms was 2024. Transition of care: patient was not received from another setting of care.            

18:31 Method Of Arrival: Ambulatory                                                           jb4 

18:31 Acuity: GINGER 4                                                                           jb4 

                                                                                                  

Triage Assessment:                                                                                

18:31 General: Appears in no apparent distress. comfortable, Behavior is calm, cooperative,   jb4 

      appropriate for age. Pain: Complains of pain in face, right arm, left arm, right leg,       

      left leg and neck Pain currently is 7 out of 10 on a pain scale. Neuro: Level of            

      Consciousness is awake, alert, obeys commands, Oriented to person, place, time,             

      situation. Cardiovascular: Patient's skin is warm and dry. Respiratory: Airway is           

      patent Respiratory effort is even, unlabored, Respiratory pattern is regular,               

      symmetrical. Derm: Skin is intact, Skin is pink, warm \T\ dry. Rash noted that is           

      urticaria, on left side of head, right bicep, dorsal aspect of right forearm, left          

      bicep, dorsal aspect of left forearm, right hamstring and left hamstring.                   

      Musculoskeletal: Circulation, motion, and sensation intact. Range of motion: intact in      

      all extremities.                                                                            

                                                                                                  

OB/GYN:                                                                                           

18:31 LMP N/A - Hysterectomy, Not pregnant                                                    jb4 

                                                                                                  

Historical:                                                                                       

- Allergies:                                                                                      

18:30 No Known Allergies;                                                                     jb4 

- PMHx:                                                                                           

18:30 Hypertension;                                                                           jb4 

- PSHx:                                                                                           

18:30  section; hysterectomy;                                                         jb4 

                                                                                                  

- Immunization history:: Adult Immunizations unknown.                                             

- Infectious Disease History:: Denies.                                                            

- Social history:: Smoking status: Patient denies any tobacco usage or history of.                

                                                                                                  

                                                                                                  

Screenin:10 [facility] ED Fall Risk Assessment (Adult) History of falling in the last 3 months,       jb4 

      including since admission No falls in past 3 months (0 pts) Confusion or Disorientation     

      No (0 pts) Intoxicated or Sedated No (0 pts) Impaired Gait No (0 pts) Mobility Assist       

      Device Used No (0 pt) Altered Elimination No (0 pt) Score/Fall Risk Level 0 - 2 = Low       

      Risk Oriented to surroundings, Maintained a safe environment. Abuse screen: Denies          

      threats or abuse. Nutritional screening: No deficits noted. Tuberculosis screening: No      

      symptoms or risk factors identified.                                                        

                                                                                                  

Assessment:                                                                                       

19:10 Reassessment: Patient appears in no apparent distress at this time. Patient and/or      jb4 

      family updated on plan of care and expected duration. Pain level reassessed. Patient is     

      alert, oriented x 3, equal unlabored respirations, skin warm/dry/pink.                      

                                                                                                  

Vital Signs:                                                                                      

18:31  / 106; Pulse 98; Resp 19; Temp 98.7(O); Pulse Ox 98% on R/A; Weight 83.91 kg     jb4 

      (R); Height 5 ft. 1 in. (R);                                                                

18:31 Body Mass Index 34.96 (83.91 kg, 154.94 cm)                                             jb4 

                                                                                                  

ED Course:                                                                                        

18:23 Patient arrived in ED.                                                                  mr  

18:24 [facility]in [facility]C is [facility]                                                        kb  

18:25 Rayray Cowart MD is Attending Physician.                                             kb  

18:31 Arm band placed on right wrist.                                                         jb4 

18:35 Triage completed.                                                                       jb4 

18:40 Christa Guzman, RN is Primary Nurse.                                                   iw  

19:10 Patient has correct armband on for positive identification. Bed in low position. Call   jb4 

      light in reach. Side rails up X 1. Provided Education on: discharge instructions..          

19:10 No provider procedures requiring assistance completed. Patient did not have IV access   jb4 

      during this emergency room visit.                                                           

                                                                                                  

Administered Medications:                                                                         

18:43 Drug: Dexamethasone IM 10 mg IM once Route: IM; Site: right deltoid;                    jb4 

19:10 Follow up: Response: No adverse reaction; Marked relief of symptoms                     jb4 

18:43 Drug: Famotidine PO 20 mg PO once Route: PO;                                            jb4 

19:10 Follow up: Response: No adverse reaction; Marked relief of symptoms                     jb4 

                                                                                                  

                                                                                                  

Medication:                                                                                       

19:10 VIS not applicable for this client.                                                     jb4 

                                                                                                  

Outcome:                                                                                          

18:46 Discharge ordered by MD.                                                                heriberto  

19:10 Discharged to home ambulatory, with family,                                             jb4 

19:10 Condition: stable                                                                           

19:10 Discharge instructions given to patient, Instructed on discharge instructions, follow       

      up and referral plans. medication usage, Demonstrated understanding of instructions,        

      follow-up care, medications, Prescriptions given X 2,                                       

19:15 Patient left the ED.                                                                    jb4 

                                                                                                  

Signatures:                                                                                       

Helga Larkin, ANNA-C                 JUVEP-Yisel [facility]  mr                                                   

Christa Guzman, RN                     RN   iw                                                   

Osmani Gustafson RN                       RN   jb4                                                  

                                                                                                  

**************************************************************************************************

## 2024-12-04 NOTE — XMS REPORT
Continuity of Care Document



                          Created on: 2024





REYES, MARCI REJI

External Reference #: [id number]

: 1980

Sex: Female



Demographics





                                        Address             [address] [place] [address]  [address]

 

                                        Home Phone          [phone]

 

                                        Work Phone          [phone]

 

                                        Email Address       [email or web address]

 

                                        Preferred Language  English

 

                                        Marital Status      Unknown

 

                                        Yarsanism Affiliation Unknown

 

                                        Race                Unknown

 

                                        Additional Race(s)  Unavailable

 

                                        Ethnic Group        Unknown





Author





                                        Name                Unknown

 

                                        Address             [address] [facility] [address]

[place]  [address]

 

                                        [facility]

[employer]

 

                                        Address             [address] [facility]

[place]  [address]

 

                                        Phone               [phone]





Support





                          Name         Relationship Address      Phone

 

                                REYES, ELPIDIO J SP              [address] [place]  [address]                 [phone]

 

                                REYES, ELPIDIO J SP              [address] [place]  [address]                 [phone]

 

                                REYES, ELPIDIO J Personal Relationship [address] [place]  [address]                 [phone]





Care Team Providers





                                Care Team Member Name Role            Phone

 

                                Rochelle Quinn Attending Clinician Unavailable

 

                                Namita Andrea Attending Clinician Unavailable

 

                                Abril Wood  Attending Clinician Unavailable

 

                                Elizabet Hearn Attending Clinician UnavailNamita Carnes Admitting Clinician Unavailable

 

                                Abril Wood  Admitting Clinician Unavailable

 

                                Agus Perez Admitting Clinician Unavailable







Payers





                    Payer Name Policy Type Policy Number Effective Date Expirati

on Date Source







Allergies, Adverse Reactions, Alerts





                                                    Allergy 

Name                                    Allergy 

Type            Status          Severity        Reaction(s)     Onset 

Date                                    Inactive 

Date                                    Treating 

Clinician                 Comments                  Source

 

                                                    No Known 

Allergie

s            DA           Active       U                         2024-0

3- 

00:00:

00                                                              [facility]

 

                                                    No Known 

Allergie

s            DA           Active       U                         - 

00:00:

00                                                              [facility]

 

                                                    No Known 

Allergie

s            DA           Active       U                         - 

00:00:

00                                                              [facility]







Procedures





                          Procedure    Date / Time Performed Performing Clinicia

n Source

 

                          [id number]      2020 00:00:00 [facility]

 

                          [id number]      2020 00:00:00 [facility]







Encounters





                                                    Start 

Date/Time                               End 

Date/Time                               Encounter 

Type                                    Admission 

Type                                    Attending 

Clinicians                              Care 

Facility                                Care 

Department                              Encounter 

ID                                      Source

 

                                                    2023 

16:07:00                        Outpatient                      Rochelle Quinn             [facility]              [id number]

[id number]                                   [facility]

 

                                                    2020 

07:15:00                        Inpatient       Namita Boyd               [facility]               [place]                  [id number]

2002                                  [facility]

l [facility]

 

                                                    2020 

13:30:00                        Inpatient       Namita Boyd               [facility]               [place]                  [id number]

                                  [facility]

[facility] 

[place]

 

                                                    2020 

13:07:00                        Inpatient                       Lisa Andreanna               [facility]               ROBERTO CARLOS                [id number]

2001                                  [facility] 

[facility]

l [facility]

 

                                                    2024 

08:28:00                                2024 

10:38:00            Inpatient           Abril Nath              [facility]               MEDI.01             [id number]

74                                      [facility] 

[facility]

[facility]

 

                                                    2023-10-23 

10:44:00                                2023-10-23 

10:44:00            Outpatient          Elizabet Alexis           [facility]               RADI                [id number]

19                                      [facility]

 

                                                    2020 

11:00:00                                2020 

11:00:00            Outpatient                              Namita Andrea               [facility]               RADI                [id number]

2001                                  [facility] 

[facility]

[facility]







Results





                    Test Description Test Time Test Comments Results   Result Co

mments Source







                                        

 

                                        

 

                                        





CBC W/AUTO [id number] 05:33:00* 



                      Test Item  Value      Reference Range Interpretation Comme

nts

 

                      WHITE BLOOD CELL (test code = WBC) 13.4 K/mm3 6.5-12.3   H

          

 

                      RED BLOOD CELL (test code = RBC) 3.72 M/mm3 3.51-4.69  N  

        

 

                      HEMOGLOBIN (test code = HGB) 9.9 g/dL   10.1-13.8  L      

    

 

                      HEMATOCRIT (test code = HCT) 31.5 %     32.5-41.8  L      

    

 

                      MEAN CELL VOLUME (test code = MCV) 84.7 fL    84.6-96.6  N

          

 

                      MEAN CELL HGB (test code = MCH) 26.6 pg    27.3-33.9  L   

       

 

                                                    MEAN CELL HGB CONCETRATION (

test 

code = MCHC)    31.4 gm/dL      32.0-34.2       L               

 

                                                    RED CELL DISTRIBUTION WIDTH 

(test 

code = RDW)     13.3 %          12.2-16.3       N               

 

                      PLATELET COUNT (test code = PLT) 333 K/mm3  134-363    N  

        

 

                                                    MEAN PLATELET VOLUME (test c

ode = 

MPV)            10.1 fL         9.2-12.7        N               

 

                      NEUTROPHIL % (test code = NT%) 78.2 %     57.9-77.3  H    

      

 

                      LYMPHOCYTE % (test code = LY%) 14.4 %     14.5-29.7  L    

      

 

                      MONOCYTE % (test code = MO%) 6.9 %      3.6-10.2   N      

    

 

                      EOSINOPHIL % (test code = EO%) 0.0 %      0.0-3.0    N    

      

 

                      BASOPHIL % (test code = BA%) 0.3 %      0.1-0.9    N      

    

 

                      NEUTROPHIL # (test code = NT#) 10.5 K/mm3                 

      

 

                      LYMPHOCYTE # (test code = LY#) 1.9 K/mm3                  

      

 

                      MONOCYTE # (test code = MO#) 0.9 K/mm3                    

    

 

                      EOSINOPHIL # (test code = EO#) 0 K/mm3                    

      

 

                      BASOPHIL # (test code = BA#) 0.0 K/mm3                    

    





AG HEPATITIS B SURFACE2024-03-15 17:34:00* 



                      Test Item  Value      Reference Range Interpretation Comme

nts

 

                                                    AG HEPATITIS B SURFACE (test

 code 

= HBSAG)        NONREACTIVE     NONREACTIVE                     





AB HEPATITIS C PROFILE2024-03-15 17:34:00* 



                      Test Item  Value      Reference Range Interpretation Comme

nts

 

                                                    AB HEPATITIS C (test code = 

HCVAB)          NONREACTIVE     NONREACTIVE                     

 

                                                    SIGNAL TO CUTOFF (test code 

= 

CUTOFF)         0.20            <0.80           N               





AB HIV 1 22024-03-15 17:34:00* 



                      Test Item  Value      Reference Range Interpretation Comme

nts

 

                                                    AB HIV 1 2 (test 

code = [id number]) NONREACTIVE     NONREACTIVE                     Done by [employer] 

[employer] 

4th Gen HIV Ag/Ab Combo 

Screen





BASIC METABOLIC PANEL2024-03-15 17:32:00* 



                      Test Item  Value      Reference Range Interpretation Comme

nts

 

                                                    SODIUM (test code = 

NA)             142 mEq/L       135-145         N               

 

                                                    POTASSIUM (test code 

= K)            3.7 mEq/L       3.5-5.0         N               

 

                                                    CHLORIDE (test code 

= CL)           106 mEq/L       100-115         N               

 

                                                    CARBON DIOXIDE (test 

code = CO2)     23 mEq/L        22-31           N               

 

                                                    ANION GAP (test code 

= GAP)          17.10           10-20           N               

 

                                                    GLUCOSE (test code = 

GLU)            135 mg/dL                 H               

 

                                                    BLOOD UREA NITROGEN 

(test code = BUN) 9 mg/dL         7-18            N               

 

                                                    CREATININE (test 

code = CREAT)   0.7 mg/dL       0.5-1.0         N               

 

                                                    CALCIUM (test code = 

CA)             8.6 mg/dL       8.4-10.2        N               

 

                                                    GLOMERULAR 

FILTRATION RATE 

(test code = GFR) 111 ml/min      >60             N               The Glomerular

 

Filtration Rate is a 

calculated 

parameterbased on serum 

Creatinine, patient age 

and sex. GFR valuesless 

than 60 mL/min/1.73 

square meters are 

indicative ofChronic 

Kidney Disease. Values 

less than 15 

mL/min/1.73square 

meters indicate Kidney 

failure. The 

calculation forGFR is 

based on the CKD-EPI 

(202) calculation. 

This formulais race 

indifferent and is the 

recommended formula for 

GFRby the National 

Kidney Foundation for 

Adults.The GFR will not 

calculate if the sex is 

unknown or if 

thepatient's age is <18 

years.





HCG SERUM QUAL2024-03-15 17:12:00* 



                      Test Item  Value      Reference Range Interpretation Comme

nts

 

                      HCG SERUM QUAL (test code = HCGQL) NEGATIVE               

          





COVID 19 Asymptomatic IH AG2024-03-15 16:25:00* 



                      Test Item  Value      Reference Range Interpretation Comme

nts

 

                                                    COVID 19 

Asymptomatic IH AG 

(test code = 

COVNONPUIAG)    NEGATIVE        NEGATIVE                        This test has be

en 

authorized only for the 

detection ofproteins from 

SARS-CoV-2, not for any 

other viruses 

orpathogens. Negative 

results should be treated 

as presumptive 

andconfirmed with a 

molecular assay, if 

necessary for 

patientmanagement. 

Negative results do not 

rule out COVID-19 

andshould not be used as 

the sole basis for 

treatment orpatient 

management decisions, 

including infection 

controldecisions. 

Negative results should 

be considered in 

thecontext of a patient's 

recent exposures, history 

and thepresence of 

clinical signs and 

symptoms consistent 

withCOVID-19. This test 

has not been FDA cleared 

or approved; the test 

hasbeen authorized by FDA 

under an Emergency Use 

Authorization(EUA) for 

use by laboratories 

certified under the CLIA 

thatmeet the requirements 

to perform moderate, high 

or waivedcomplexity 

tests. This test is 

authorized for use at 

thePoint of Care (POC), 

i.e., in patient care 

settingsoperating under a 

CLIA Certificate of 

Waiver, Certificate 

ofCompliance, or 

Certificate of 

Accreditation. This test 

is only authorized for 

the duration of 

thedeclaration that 

circumstances exist 

justifying 

theauthorization of 

emergency use of in vitro 

diagnostic testsfor 

detection and/or 

diagnosis of COVID-19 

under Zlzcwgr010(b)(1) of 

the Act, 21 U.S.C. 

360bbb-3(b)(1), unless 

theauthorization is 

terminated or revoked 

sooner.





URINALYSIS COMPLETE2024-03-15 16:14:00* 



                      Test Item  Value      Reference Range Interpretation Comme

nts

 

                      UA COLOR (test code = COLU) YELLOW     YELLOW             

   

 

                                                    UA APPEARANCE (test code = 

APPU)           Slightly-Cloudy CLEAR                           

 

                                                    UA GLUCOSE DIPSTICK (test 

code = DGLUU)   NEGATIVE        NEG                             

 

                                                    UA BILIRUBIN DIPSTICK (test 

code = BILU)    NEGATIVE        NEG                             

 

                                                    UA KETONE DIPSTICK (test cod

e 

= KETU)         NEGATIVE        NEG                             

 

                                                    UA SPECIFIC GRAVITY (test 

code = SGU)     1.021           1.001-1.035     N               

 

                                                    UA BLOOD DIPSTICK (test code

 

= JANES)          NEG             NEG                             

 

                                                    UA PH DIPSTICK (test code = 

GABRIELA)            5.0             5-9                             

 

                                                    UA PROTEIN DIPSTICK (test 

code = PROU)    NEGATIVE        NEG                             

 

                                                    UA UROBILINIOGEN DIPSTICK 

(test code = URO) NEGATIVE mg/dL  NEG                             

 

                                                    UA NITRITE DIPSTICK (test 

code = MAGALY)    NEG             NEG                             

 

                                                    UA LEUKOCYTE ESTERASE 

DIPSTICK (test code = LEUU) NEG             NEG                             

 

                      UA WBC (test code = WBCU) 0-2 #/hpf  NONE SEEN            

 

 

                      UA RBC (test code = RBCU) 0-2 #/hpf  NONE SEEN            

 

 

                                                    UA EPITHELIAL CELLS (test 

code = EPIU)    RARE #/HPF      RARE-FEW                        

 

                      UA MUCUS (test code = MUCU) 2+         NONE SEEN  A       

   





URINE SAMPLE: CLEAN CATCHCBC W/AUTO DIFF2024-03-15 16:05:00* 



                      Test Item  Value      Reference Range Interpretation Comme

nts

 

                      WHITE BLOOD CELL (test code = WBC) 8.5 K/mm3  6.5-12.3   N

          

 

                      RED BLOOD CELL (test code = RBC) 4.10 M/mm3 3.51-4.69  N  

        

 

                      HEMOGLOBIN (test code = HGB) 11.2 g/dL  10.1-13.8  N      

    

 

                      HEMATOCRIT (test code = HCT) 35.3 %     32.5-41.8  N      

    

 

                      MEAN CELL VOLUME (test code = MCV) 86.1 fL    84.6-96.6  N

          

 

                      MEAN CELL HGB (test code = MCH) 27.3 pg    27.3-33.9  N   

       

 

                                                    MEAN CELL HGB CONCETRATION (

test 

code = MCHC)    31.7 gm/dL      32.0-34.2       L               

 

                                                    RED CELL DISTRIBUTION WIDTH 

(test 

code = RDW)     13.1 %          12.2-16.3       N               

 

                      PLATELET COUNT (test code = PLT) 364 K/mm3  134-363    H  

        

 

                                                    MEAN PLATELET VOLUME (test c

ode = 

MPV)            10.0 fL         9.2-12.7        N               

 

                      NEUTROPHIL % (test code = NT%) 66.1 %     57.9-77.3  N    

      

 

                      LYMPHOCYTE % (test code = LY%) 25.4 %     14.5-29.7  N    

      

 

                      MONOCYTE % (test code = MO%) 5.2 %      3.6-10.2   N      

    

 

                      EOSINOPHIL % (test code = EO%) 2.5 %      0.0-3.0    N    

      

 

                      BASOPHIL % (test code = BA%) 0.7 %      0.1-0.9    N      

    

 

                      NEUTROPHIL # (test code = NT#) 5.6 K/mm3                  

      

 

                      LYMPHOCYTE # (test code = LY#) 2.2 K/mm3                  

      

 

                      MONOCYTE # (test code = MO#) 0.4 K/mm3                    

    

 

                      EOSINOPHIL # (test code = EO#) 0.21 K/mm3                 

      

 

                      BASOPHIL # (test code = BA#) 0.1 K/mm3                    

    





- DUP AB/PEL/SC COMP2023-10-23 13:45:00
************************************************************[facility] 1980 Sex: 
F************************************************************ Name: REYES,MARCI 
[facility] : 1980 Age/S: 43 / F [id number] [facility] Unit #: 
[id number] Loc: [place] [address] Phys: Elizabet Hearn MD Acct: [id number] 
Dis Date:  Status: REG CLI PHONE #: [phone] Exam Date: 10/23/2023 1139 FAX 
#: Reason: EXCESSIVE MENSTRUATIONS  EXAMS: CPT: [id number] AB/PEL/SC COMP 
22515 CLINICAL INFORMATION: Menorrhagia. Irregular cycles. Dictation location: A
1 Comparison: No recent similar prior. Technique: Transabdominal and/or 
transvaginal study was done. Duplex color and spectral Doppler analysis of 
ovarian flow. FINDINGS: The uterus measures 12.4 x 6.2 x 7.2 cm. The endometrial
echo measured 1.3cm. The ovaries were normal in size and appearance with no 
adnexal mass or fluid identified. Normal blood flow was visualized by Doppler. 
Minimal free fluid was seen in the cul-de-sac. No significant cervical cyst 
formation. IMPRESSION: Slight thickeningof the endometrial echo could be due to 
the phase of menstruation. Otherwise no uterine or adnexal mass identified. ** 
Electronically Signed by FERNANDO Alvarado ** ** on 10/23/2023 at 1345 ** 
Reported and signed by: Christopher Alvarado M.D. CC: Agus Perez DO; Elizabet Hearn MD  Technologist: Dalila Hall Trnscb Date/Time: 10/23/2023 (1345) 
JoseAGV PAGE 1 Signed Report  Name: REYES,MARCI [facility] : 1980 
Age/S: 43 / F [id number] [facility] Unit #: [id number]  Loc: [place] [address] 
Phys: Elizabet Hearn MD Acct: [id number] Dis Date: Status: REG CLI  PHONE #:
[phone] Exam Date: 10/23/2023 1139 FAX #: Reason: EXCESSIVE MENSTRUATIONS 
EXAMS:  CPT: [id number] DUP AB/PEL/SC COMP 89774 (Continued) Orig Print D/T: S: 
10/23/2023 (1348) Probe:  PAGE 2 Signed Report- US PELVIC COMPLETE2023-10-23 
13:45:00************************************************************[facility] : 1980 Sex: 
F************************************************************ Name: REYES,MARCI 
[facility]  : 1980 Age/S: 43 / F [address] [address] Unit #: 
[id number] Loc: [place] [address] Phys: Elizabet Hearn MD  Acct: [id number]
Dis Date: Status: REG CLI PHONE #: [phone] Exam Date: 10/23/2023 1139  FAX 
#: Reason: MENORRHAGIA WITH REGULAR CYCLE EXAMS: CPT: [id number] US PELVIC 
COMPLETE  [address] CLINICAL INFORMATION: Menorrhagia. Irregular cycles. Dictation 
location: A 1 Comparison: No recent similar prior.  Technique: Transabdominal 
and/or transvaginal study was done. Duplex color and spectral Doppler analysis 
of ovarian flow. FINDINGS: The uterus measures 12.4 x 6.2 x 7.2 cm. The 
endometrial echo measured 1.3cm. The ovaries were normal in size and appearance 
with no adnexal mass or fluid identified. Normal blood flow was visualized by 
Doppler. Minimal free fluid was seen in the cul-de-sac. No significant cervical 
cyst formation. IMPRESSION: Slight thickening of the endometrial echo could be 
due to the phase of menstruation. Otherwise no uterine or adnexal mass 
identified. ** Electronically Signed by FERNANDO Alvarado ** **  on 
10/23/2023 at 1345 ** Reported and signed by: Christopher Alvarado M.D. CC: Agus Hearn MD  Technologist: Dalila Hall Trnscb Date/Time: 
10/23/2023 (9442) [email or web address]  PAGE 1 Signed Report Name:REYES,MARCI [facility]
: 1980 Age/S: 43 / F [id number] [facility] Unit #: [id number] Loc: [place] [address] Phys: Elizabet Hearn MD  Acct: [id number] Dis Date: 
Status: REG CLI PHONE #: [phone] Exam Date: 10/23/2023 1139 FAX #: Reason: 
MENORRHAGIA WITH REGULAR CYCLE EXAMS: CPT: [id number] US PELVIC COMPLETE [address]605  
(Continued) Orig Print D/T: S: 10/23/2023 (6686) Probe: PAGE 2  Signed Report- 
US TRANSVAGINAL NON OB2023-10-23 13:45:00
************************************************************[facility] 1980 Sex: 
F************************************************************ Name: REYES,MARCI 
[facility] : 1980 Age/S: 43 / F [address] [facility] Unit #: 
[id number] Loc:  [place] [address] Phys: Elizabet Hearn MD Acct: [id number]
Dis Date: Status: REG CLI  PHONE #: [phone] Exam Date: 10/23/2023 1132 FAX 
#: Reason: MENORRHAGIA WITH REGULAR CYCLE EXAMS:  CPT: [id number] US TRANSVAGINAL
NON OB 66[address] CLINICAL INFORMATION: Menorrhagia. Irregular cycles. Dictation
location: A 1 Comparison: No recent similar prior. Technique: Transabdominal 
and/or transvaginal study was done. Duplex color and spectral Doppler analysis 
of ovarian flow. FINDINGS: The uterus measures 12.4 x 6.2 x 7.2 cm. The 
endometrial echo measured 1.3cm. The ovaries were normal in size and appearance 
with no adnexal mass or fluid identified. Normal blood flow was visualized by 
Doppler. Minimal free fluid was seen in the cul-de-sac. No significant cervical 
cyst formation.  IMPRESSION: Slight thickening of the endometrial echo could be 
due to the phase of menstruation. Otherwise no uterine or adnexal mass 
identified. ** Electronically Signed by FERNANDO Alvarado ** ** on 10/23/2023
at 1345 ** Reported and signed by: Christopher Alvarado M.D.  CC: Agus Perez DO; Elizabet Hearn MDTechnologist: Dalila Hall  Trnscb Date/Time: 10/23/2023
(1468) JoseAGV PAGE 1 Signed Report Name: REYES,MARCI  [facility] : 
1980 Age/S: 43 / F [id number] Unit #: [id number] Loc: [place]
[address]  Phys: Elizabet Hearn MD Acct: [id number] Dis Date: Status: REG CLI 
PHONE #: [phone] Exam Date: 10/23/2023 1138 FAX #: Reason: MENORRHAGIA WITH
REGULAR CYCLE EXAMS: CPT: [id number] [place] [address] (Continued) 
Orig Print D/T: S: 10/23/2023 (2802) Probe: [id number]  PAGE 2 Signed Report
FALLOPIAN TUBE,[id number] 15:04:00
--------------------------------------------------------------------------------

------------RUN DATE: 20  [facility] PAGE 1 RUN TIME:  
Specimen Inquiry RUN USER: INTERFACE  -------
--------------------------------------------------------------------------------

-----PATIENT: REYES,MARCI ACCT #: [id number] LOC: [facility]ELI U #: [id number] 
AGE/SX: 39/F ROOM: [facility] [id number] DR: Namita Andrea MD : 84 
BED: A DIS:  STATUS: ADM IN TLOC: --------------------------------
------------------------------------------------------------ SPEC #: 
[id number] RECD:  STATUS: YASMANI CESPEDES #: [id number] STEFANIE: 20- 
SUBM DR: Namita Andrea MD ENTERED:  SP TYPE: [facility] OT DR: 
Bisi Ascencio MD ORDERED: LEVEL II SURGIC/2 CODES: [id number] - FALLOPIAN TUBE
COPIES TO: Bisi Ascencio MD [address] [place] [address] [phone] 
[email or web address] Namita Andrea MD [address] [place] 
[address] [phone] PROCEDURES: LEVEL II SURGIC (Incomplete) TISSUES: 
FALLOPIAN TUBE, NOS - BILATERAL FALLOPIAN TUBES CLINICAL HISTORY 39 year old, 
 @ 37.1 weeks, CHTN (kr) FINAL DIAGNOSIS Specimen #1 right fallopian tube, 
segmental resection: - histologic - complete lumen demonstrated Specimen #2 left
fallopian tube, brennen resection: - histologic  - complete lumen demonstrated
CPT code(s): 88302 x2 cds/wpd 20 ** CONTINUED ON NEXT PAGE ** 
--------------------------------------------------------------------------------

------------RUN DATE: 20 [facility] PAGE 2 RUN TIME:  
Specimen Inquiry RUN USER: INTERFACE 
--------------------------------------------------------------------------------

------------SPEC #: [id number] PATIENT: REYES,MARCI #[id number] 
(Continued)-------------------------------
------------------------------------------------------------- GROSS DESCRIPTION 
ANATOMIC SOURCE OF TISSUE (per Requisition): Fallopian right and left tube 
segments (two containers) Each specimen is labeled with the patient's name and 
medical record number. Specimen #1 is designated "right fallopiantube" and 
consists of a 1.0 cm in length and 0.4 cm in diameter pink-purple and hyperemic 
segment of fallopian tube. The lumen is pinpoint. Representative sections are 
submitted labeled A1. Specimen #2 is designated "left fallopian tube" and 
consists of a 0.8 cm in length and 0.6 cm in diameter pink-purple and hyperemic 
segment of fallopian tube. The lumen is pinpoint. Entirely submitted as B1. da
/denzel 20 MICROSCOPIC DESCRIPTION Cross-sections of fallopian tubes are 
histologic and demonstratecompletely transected lumens. errol/wpprincess 
20------------------------------------------------------
-------------------------------------- Signed ________________________________ 
Miky Ward 20 1504 
--------------------------------------------------------------------------------

------------  ** END OF REPORT **CBC W/AUTO [id number] 05:36:00* 



                      Test Item  Value      Reference Range Interpretation Comme

nts

 

                                                    WHITE BLOOD CELL (test 

code = WBC)     15.5 K/mm3      6.6-12.1        H               Results verified

 by 

repeat analysis

 

                                                    RED BLOOD CELL (test 

code = RBC)     4.01 M/mm3      3.45-5.01       N               

 

                                                    HEMOGLOBIN (test code = 

HGB)            11.6 g/dL       10.7-13.9       N               

 

                                                    HEMATOCRIT (test code = 

HCT)            35.9 %          32.1-42.1       N               

 

                                                    MEAN CELL VOLUME (test 

code = MCV)     90 fL           84.1-94.8       N               

 

                                                    MEAN CELL HGB (test code 

= MCH)          28.9 pg         27-35           N               

 

                                                    MEAN CELL HGB 

CONCETRATION (test code 

= MCHC)         32.3 gm/dL      32.2-34.1       N               

 

                                                    RED CELL DISTRIBUTION 

WIDTH (test code = RDW) 19.0 %          12.4-16.5       H               

 

                                                    PLATELET COUNT (test 

code = PLT)     199 K/mm3       133-385         N               

 

                                                    MEAN PLATELET VOLUME 

(test code = MPV) 11.0 fl         9.1-12.7        N               

 

                                                    NEUTROPHIL % (test code 

= NT%)          85.4 %          56.5-79.4       H               

 

                                                    LYMPHOCYTE % (test code 

= LY%)          8.2 %           14.3-34.3       L               

 

                                                    MONOCYTE % (test code = 

MO%)            5.6 %           5.1-10.4        N               

 

                                                    EOSINOPHIL % (test code 

= EO%)          0.1 %           0.1-3.0         N               

 

                                                    BASOPHIL % (test code = 

BA%)            0.2 %           0.1-1.0         N               

 

                                                    NEUTROPHIL # (test code 

= NT#)          13.2 K/mm3                                      

 

                                                    LYMPHOCYTE # (test code 

= LY#)          1.3 K/mm3                                       

 

                                                    MONOCYTE # (test code = 

MO#)            0.9 K/mm3                                       

 

                                                    EOSINOPHIL # (test code 

= EO#)          0.01 K/mm3                                      

 

                                                    BASOPHIL # (test code = 

BA#)            0.0 K/mm3                                       

 

                                                    RBC MORPHOLOGY REQUIRED 

(test code = RBCM) NORMAL          NORMAL                          

 

                                                    PLATELET MORPHOLOGY 

REQUIRED (test code = 

PLTMR)          NORMAL          NORMAL                          





AG HEPATITIS B [id number] 16:21:00* 



                      Test Item  Value      Reference Range Interpretation Comme

nts

 

                                                    AG HEPATITIS B SURFACE (test

 code 

= HBSAG)        NONREACTIVE     NONREACTIVE                     





IS CONSENT FORM SIGNED FOR HIV TESTING? YAB HEPATITIS C [id number] 
16:21:00* 



                      Test Item  Value      Reference Range Interpretation Comme

nts

 

                                                    AB HEPATITIS C (test code = 

HCVAB)          NONREACTIVE     NONREACTIVE                     

 

                                                    SIGNAL TO CUTOFF (test code 

= 

CUTOFF)         0.15            <0.80           N               





IS CONSENT FORM SIGNED FOR HIV TESTING? YAB [id number] 16:21:00* 



                      Test Item  Value      Reference Range Interpretation Comme

nts

 

                      AB TREPONEMA (test code = TREPAB) NONREACTIVE NONREACTIVE 

           





IS CONSENT FORM SIGNED FOR HIV TESTING? YAB HIV 1  16:21:00* 



                      Test Item  Value      Reference Range Interpretation Comme

nts

 

                                                    AB HIV 1 2 (test 

code = VBF90YI) NONREACTIVE     NONREACTIVE                     Done by [employer] 

[employer] 

4th Gen HIV Ag/Ab Combo 

Screen





IS CONSENT FORM SIGNED FOR HIV TESTING? YAG HEPATITIS B [id number] 
15:52:00* 



                      Test Item  Value      Reference Range Interpretation Comme

nts

 

                                                    AG HEPATITIS B SURFACE (test

 code 

= HBSAG)        NONREACTIVE     NONREACTIVE                     





IS CONSENT FORM SIGNED FOR HIV TESTING? YAB HEPATITIS C [id number] 
15:52:00* 



                      Test Item  Value      Reference Range Interpretation Comme

nts

 

                      AB HEPATITIS C (test code = HCVAB)            NONREACTIVE 

           

 

                      SIGNAL TO CUTOFF (test code = CUTOFF)            <0.80    

             





IS CONSENT FORM SIGNED FOR HIV TESTING? CRISTIB [id number] 15:52:00* 



                      Test Item  Value      Reference Range Interpretation Comme

nts

 

                      AB TREPONEMA (test code = TREPAB) NONREACTIVE NONREACTIVE 

           





IS CONSENT FORM SIGNED FOR HIV TESTING? NOELLE HIV 1  15:52:00* 



                      Test Item  Value      Reference Range Interpretation Comme

nts

 

                      AB HIV 1 2 (test code = HMG24YN)            NONREACTIVE   

         





IS CONSENT FORM SIGNED FOR HIV TESTING? YUR PROTEIN/CREATININE [id number] 
15:35:00* 



                      Test Item  Value      Reference Range Interpretation Comme

nts

 

                                                    UR PROTEIN RANDOM (test code

 = 

PROTU)          33.0 mg/dL                                      

 

                                                    UR CREATININE RANDOM (test 

code = CREATU)  78.8 mg/dL                                      

 

                                                    PROTEIN/CREATININE RATIO (te

st 

code = P/CRATIO) 410.0 mg/gcrea  <200            H               





URIC [id number] 15:02:00* 



                      Test Item  Value      Reference Range Interpretation Comme

nts

 

                      URIC ACID (test code = URIC) 4.7 mg/dL  2.6-6.0    N      

    





[id number] 15:02:00* 



                      Test Item  Value      Reference Range Interpretation Comme

nts

 

                      SGOT/AST (test code = AST) 33 units/L 15-37               

  





SGPT/[id number] 15:02:00* 



                      Test Item  Value      Reference Range Interpretation Comme

nts

 

                      SGPT/ALT (test code = ALT) 24 units/L 12-78      N        

  





LACTIC DEHYDROGENASE(LDH)2020 15:02:00* 



                      Test Item  Value      Reference Range Interpretation Comme

nts

 

                                                    LACTIC DEHYDROGENASE(LDH) (t

est 

code = LDH)     198 units/L               N               





URINALYSIS W/O [id number] 14:30:00* 



                      Test Item  Value      Reference Range Interpretation Comme

nts

 

                                                    UA GLUCOSE DIPSTICK (test co

de = 

DGLUU)          NEGATIVE        NEGATIVE                        

 

                                                    UA KETONE DIPSTICK (test cod

e = 

KETU)           1+              NEGATIVE                        

 

                                                    UA PROTEIN DIPSTICK (test co

de = 

PROU)           NEGATIVE        NEGATIVE                        





IS NURSE PERFORMING TEST? NCBC W/AUTO [id number] 14:28:00* 



                      Test Item  Value      Reference Range Interpretation Comme

nts

 

                      WHITE BLOOD CELL (test code = WBC) 8.1 K/mm3  6.6-12.1   N

          

 

                      RED BLOOD CELL (test code = RBC) 4.32 M/mm3 3.45-5.01  N  

        

 

                      HEMOGLOBIN (test code = HGB) 12.1 g/dL  10.7-13.9  N      

    

 

                      HEMATOCRIT (test code = HCT) 38.7 %     32.1-42.1  N      

    

 

                      MEAN CELL VOLUME (test code = MCV) 90 fL      84.1-94.8  N

          

 

                      MEAN CELL HGB (test code = MCH) 28.0 pg    27-35      N   

       

 

                                                    MEAN CELL HGB CONCETRATION (

test 

code = MCHC)    31.3 gm/dL      32.2-34.1       L               

 

                                                    RED CELL DISTRIBUTION WIDTH 

(test 

code = RDW)     19.4 %          12.4-16.5       H               

 

                      PLATELET COUNT (test code = PLT) 196 K/mm3  133-385    N  

        

 

                                                    MEAN PLATELET VOLUME (test c

ode = 

MPV)            11.5 fl         9.1-12.7        N               

 

                      NEUTROPHIL % (test code = NT%) 72.7 %     56.5-79.4  N    

      

 

                      LYMPHOCYTE % (test code = LY%) 18.1 %     14.3-34.3  N    

      

 

                      MONOCYTE % (test code = MO%) 7.1 %      5.1-10.4   N      

    

 

                      EOSINOPHIL % (test code = EO%) 1.0 %      0.1-3.0    N    

      

 

                      BASOPHIL % (test code = BA%) 0.4 %      0.1-1.0    N      

    

 

                      NEUTROPHIL # (test code = NT#) 5.9 K/mm3                  

      

 

                      LYMPHOCYTE # (test code = LY#) 1.5 K/mm3                  

      

 

                      MONOCYTE # (test code = MO#) 0.6 K/mm3                    

    

 

                      EOSINOPHIL # (test code = EO#) 0.08 K/mm3                 

      

 

                      BASOPHIL # (test code = BA#) 0.0 K/mm3                    

    

 

                                                    RBC MORPHOLOGY REQUIRED (mita

t code 

= RBCM)         NORMAL          NORMAL                          

 

                                                    PLATELET MORPHOLOGY REQUIRED

 (test 

code = PLTMR)   NORMAL          NORMAL                          





AG HEPATITIS B [id number] 15:57:00* 



                      Test Item  Value      Reference Range Interpretation Comme

nts

 

                                                    AG HEPATITIS B SURFACE (test

 code 

= HBSAG)        NONREACTIVE     NONREACTIVE                     





AB HEPATITIS C [id number] 15:57:00* 



                      Test Item  Value      Reference Range Interpretation Comme

nts

 

                                                    AB HEPATITIS C (test code = 

HCVAB)          NONREACTIVE     NONREACTIVE                     

 

                                                    SIGNAL TO CUTOFF (test code 

= 

CUTOFF)         0.15            <0.80           N               





AB [id number] 15:57:00* 



                      Test Item  Value      Reference Range Interpretation Comme

nts

 

                      AB TREPONEMA (test code = TREPAB) NONREACTIVE NONREACTIVE 

           





AG HEPATITIS B [id number] 15:22:00* 



                      Test Item  Value      Reference Range Interpretation Comme

nts

 

                                                    AG HEPATITIS B SURFACE (test

 code 

= HBSAG)        NONREACTIVE     NONREACTIVE                     





AB HEPATITIS C [id number] 15:22:00* 



                      Test Item  Value      Reference Range Interpretation Comme

nts

 

                      AB HEPATITIS C (test code = HCVAB)            NONREACTIVE 

           

 

                      SIGNAL TO CUTOFF (test code = CUTOFF)            <0.80    

             





AB [id number] 15:22:00* 



                      Test Item  Value      Reference Range Interpretation Comme

nts

 

                      AB TREPONEMA (test code = TREPAB) NONREACTIVE NONREACTIVE 

           





BLOOD UREA [id number] 14:50:00* 



                      Test Item  Value      Reference Range Interpretation Comme

nts

 

                      BLOOD UREA NITROGEN (test code = BUN) 9 mg/dL    7-18     

  N          





[id number] 14:50:00* 



                      Test Item  Value      Reference Range Interpretation Comme

nts

 

                      CREATININE (test code = CREAT) 0.6 mg/dL  0.5-1.0    N    

      





URIC [id number] 14:50:00* 



                      Test Item  Value      Reference Range Interpretation Comme

nts

 

                      URIC ACID (test code = URIC) 4.7 mg/dL  2.6-6.0    N      

    





SGOT/[id number] 14:50:00* 



                      Test Item  Value      Reference Range Interpretation Comme

nts

 

                      SGOT/AST (test code = AST) 21 units/L 15-37      N        

  





SGPT/[id number] 14:50:00* 



                      Test Item  Value      Reference Range Interpretation Comme

nts

 

                      SGPT/ALT (test code = ALT) 21 units/L 12-78      N        

  





LACTIC DEHYDROGENASE(LDH)2020 14:50:00* 



                      Test Item  Value      Reference Range Interpretation Comme

nts

 

                                                    LACTIC DEHYDROGENASE(LDH) (t

est 

code = LDH)     176 units/L               N               





URINALYSIS [id number] 14:45:00* 



                      Test Item  Value      Reference Range Interpretation Comme

nts

 

                      UA COLOR (test code = COLU) YELLOW     YELLOW             

   

 

                                                    UA APPEARANCE (test code = 

APPU)           Slightly-Cloudy CLEAR                           

 

                                                    UA GLUCOSE DIPSTICK (test 

code = DGLUU)   NEGATIVE        NEG                             

 

                                                    UA BILIRUBIN DIPSTICK (test 

code = BILU)    NEGATIVE        NEG                             

 

                                                    UA KETONE DIPSTICK (test cod

e 

= KETU)         2+              NEG             A               

 

                                                    UA SPECIFIC GRAVITY (test 

code = SGU)     1.021           1.001-1.035     N               

 

                                                    UA BLOOD DIPSTICK (test code

 

= JANES)          NEG             NEG                             

 

                                                    UA PH DIPSTICK (test code = 

GABRIELA)            5.0             5-9                             

 

                                                    UA PROTEIN DIPSTICK (test 

code = PROU)    NEGATIVE        NEG                             

 

                                                    UA UROBILINIOGEN DIPSTICK 

(test code = URO) NEGATIVE mg/dL  NEG                             

 

                                                    UA NITRITE DIPSTICK (test 

code = MAGALY)    POSITIVE        NEG             A               

 

                                                    UA LEUKOCYTE ESTERASE 

DIPSTICK (test code = LEUU) NEG             NEG                             

 

                      UA WBC (test code = WBCU) 6-10 #/hpf NONE SEEN  A         

 

 

                      UA RBC (test code = RBCU) 3-5 #/hpf  NONE SEEN  A         

 

 

                                                    UA EPITHELIAL CELLS (test 

code = EPIU)    FEW #/HPF       RARE-FEW                        

 

                                                    UA BACTERIA (test code = 

BACU)           MODERATE /HPF   RARE-FEW        A               

 

                      UA MUCUS (test code = MUCU) 3+         NONE SEEN          

   





URINE SAMPLE: CLEAN CATCHCBC W/AUTO [id number] 14:37:00* 



                      Test Item  Value      Reference Range Interpretation Comme

nts

 

                      WHITE BLOOD CELL (test code = WBC) 8.9 K/mm3  6.6-12.1   N

          

 

                      RED BLOOD CELL (test code = RBC) 4.22 M/mm3 3.45-5.01  N  

        

 

                      HEMOGLOBIN (test code = HGB) 11.8 g/dL  10.7-13.9  N      

    

 

                      HEMATOCRIT (test code = HCT) 37.5 %     32.1-42.1  N      

    

 

                      MEAN CELL VOLUME (test code = MCV) 89 fL      84.1-94.8  N

          

 

                      MEAN CELL HGB (test code = MCH) 28.0 pg    27-35      N   

       

 

                                                    MEAN CELL HGB CONCETRATION (

test 

code = MCHC)    31.5 gm/dL      32.2-34.1       L               

 

                                                    RED CELL DISTRIBUTION WIDTH 

(test 

code = RDW)     19.3 %          12.4-16.5       H               

 

                      PLATELET COUNT (test code = PLT) 191 K/mm3  133-385    N  

        

 

                                                    MEAN PLATELET VOLUME (test c

ode = 

MPV)            10.7 fl         9.1-12.7        N               

 

                      NEUTROPHIL % (test code = NT%) 73.5 %     56.5-79.4  N    

      

 

                      LYMPHOCYTE % (test code = LY%) 18.9 %     14.3-34.3  N    

      

 

                      MONOCYTE % (test code = MO%) 5.6 %      5.1-10.4   N      

    

 

                      EOSINOPHIL % (test code = EO%) 0.9 %      0.1-3.0    N    

      

 

                      BASOPHIL % (test code = BA%) 0.3 %      0.1-1.0    N      

    

 

                      NEUTROPHIL # (test code = NT#) 6.5 K/mm3                  

      

 

                      LYMPHOCYTE # (test code = LY#) 1.7 K/mm3                  

      

 

                      MONOCYTE # (test code = MO#) 0.5 K/mm3                    

    

 

                      EOSINOPHIL # (test code = EO#) 0.08 K/mm3                 

      

 

                      BASOPHIL # (test code = BA#) 0.0 K/mm3                    

    

 

                                                    RBC MORPHOLOGY REQUIRED (mita

t code 

= RBCM)         NORMAL          NORMAL                          

 

                                                    PLATELET MORPHOLOGY REQUIRED

 (test 

code = PLTMR)   NORMAL          NORMAL                          





- US PREGNANCY [facility] [id number] 12:29:00Patient Name: REYES,MARCI Unit No: 
[id number] EXAMS: CPT CODE: [id number] [place] [address] [facility] [address] OBSTETRICAL ULTRASOUND REPORT 
---------------------------------------------------------------------- Pat. 
Name: REYES, MARCI Pat. No: [id number] Study Date: 2020 11:28am , Age: 
1980, 39 Pregnancies:  4, Para 3 LMP: 2019 GA by LMP: 34w2d 
GA by 1st: 34w2d GA by US: 35w2d GA Selected: 34w2d (LMP) FREDRICK: 2020 Junie osborn MD: Namita Andrea Sonographer: Aleida Mendoza RDMS, RVT CPT4: 
USPREGFU Admitting MD: Namita Andrea Hist/Ind: SCAN 3 F/U GROWTH HTN 
---------------------------------------------------------------------- 
MEASUREMENTS FETAL AGE FETAL GROWTH EVALUATION Measurement GA Range Srce %for GA
Ratios ----------- ----- ------------- ---- ------- ------------------------- 
BPD 8.9 cm 36w4d (50r2d-19h2k) Hadl BPD 83% FL/BPD 0.73 (0.71 - 0.87) HC 32.4 cm
36w1d (27g0c-79o5a) Hadl HC 82% FL/AC 0.19 (0.20 - 0.24* APD 11.2 cm APD HC/AC 
0.94 (0.94 - 1.13* TAD 10.8 cm TAD CI 0.81 (0.70 - 0.86) AC 34.6 cm 38w5d 
(36y8l-71j9k) Hadl AC >95 FL 6.5 cm 33w2d (93a7b-16n8f) Hadl FL 34% HL 6.0 cm 
34w5d (14x5u-75z8j) Logan HL 58% GA for sonogram 35w2d (24d6x-67u8v) Fetal Weight
Estimate: based on (BPD,HC,AC,FL) Hadlock Weight: 3045 gm (5691-7134) Hadlo : 
6lbs, 11oz Normal: 2283 gm (4188-9461) Brenn  Wt% &gt;90 for 34.3 wks Cervical 
Length: 4.8 cm Fetal Heart Rate: 147 bpm Amniotic Fluid Index: 08.9cm (08.0-
24.8) Q1: 1.7cm Q2: 4.2cm Q3: 0.0cm Q4: 3.0cm 
---------------------------------------------------------------------- MATERNAL 
ANATOMY ---------------------------------------------------------------------- 
Ovaries LxHxW (cm) Right 3.7 x 1.9 x 2.0 Vol: 7.4cc Left 3.7 x 2.1 x 2.1 Vol: 
8.5cc ---------------------------------------------------------------------- 
---------------------------------------------------------------------- CLINICAL 
SUMMARY  Type of Gestation: Juárez Intrauterine pregnancy in vertex 
presentation. Fetal size is large for gestational age. [facility] NAME: REYES,MARCI  Radiology Department PHYS: Namita Garcia MD
[address] : 1980 AGE: 39 SEX: F [place], [place] [address]  ACCT NO: 
[id number] LOC: NATALIERAD PHONE #: [phone] EXAM DATE: 2020 STATUS: REG 
CLI FAX #: [phone] RAD NO: Page 1  Signed Report (CONTINUED) Patient Name: 
REYES,MARCI Unit No: [id number] EXAMS: CPT CODE: [id number] US PREGNANCY FLW UP 
90288 (Continued) Fetal growth: Suspect LGA fetus (>90%) Fetal motion and organs
seen: Fetal heart motion seen Fetal body and limb movements observed Placental 
location: Anterior Placental maturity : Grade 2 There is no evidence of placenta
previa. Amniotic fluid volume is normal. Uterus and adnexa: No significant 
abnormality is seen. Thank you for allowing us to participate in the care of 
this patient. Mack Blunt M.D. ----------------- Electronic Signature 
2020 12:29pm ** Electronically Signed by Mack Blunt MD on 2020 at 
1229 ** Reported and signed by: Mack Blunt MD  CC: Namita AndreaMD 
Technologist: Aleida Mendoza RDMS, RVT Probe: Trnscrbd D/T: 2020 (1229) 
t.SDR.YOS Orig Print D/T: S: 2020 (1229) [facility] 
NAME: REYESGOPAL Radiology Department PHYS: Namita Garcia MD [address] : 1980 AGE: 39 SEX: F [place] [address] ACCT NO: [id number]
LOC: CHELITA.RAD PHONE #: [phone] EXAM DATE: 2020 STATUS: REG CLI FAX #: 
[phone] RAD NO: Page 2 Signed Report Patient Name: REYES,MARCI Unit No: 
[id number] EXAMS:  CPT CODE: [id number] US PREGNANCY FLW UP 96877 (Continued)  
[facility] NAME: REYES,MARCIRadiology Department PHYS: 
Namita Garcia MD [address] : 1980 AGE: 39 SEX: F [place] [address] ACCT NO: [id number] LOC: CHELITA.RAD PHONE #: [phone] EXAM DATE: 
2020 STATUS: REG CLI FAX #: [phone] RAD NO: Page 3 Signed Report- US 
PREG AFTER  TRI2019-10-07 10:50:00Patient Name: REYES,MARCI Unit No: 
[id number] EXAMS: CPT CODE: [id number] US PREG AFTER  TRI [address] [facility] [address] [place], [place] [address]  OBSTETRICAL ULTRASOUND 
REPORT ---------------------------------------------------------------------- 
Pat. Name: REYES, MARCI Pat. No: [id number] Study Date: 10/07/2019 9:50am , 
Age: 1980, 39 Pregnancies:  4, Para 3 LMP: 2019 GA by LMP: 
20w1d GA by 1st: 20w1d GA by US: 21w2d GA Selected: 20w1d (LMP) FREDRICK:  2020
Referring MD: Namita Andrea Sonographer: Janet Gaona RDMS CPT4: 
[id number] Admitting MD: Namita Andrea Hist/Ind: ANATOMY SCAN 2 
---------------------------------------------------------------------- 
MEASUREMENTS FETAL AGE FETAL GROWTH EVALUATION Measurement GA Range Srce %for GA
Ratios ----------- ----- ------------- ---- ------- ------------------------- 
BPD 4.9 cm 20w6d (87c9y-70r5i) Hadl BPD 72% FL/BPD 0.65 HC 18.2 cm 20w3d (18w6d-
22w1d) Hadl HC 61% FL/AC 0.19 APD 5.4 cm  APD HC/AC1.08 (1.06 - 1.24) TAD 5.3 cm
TAD CI 0.80 (0.70 - 0.86) AC 16.8 cm 21w4d (54s2y-59e9s) Hadl AC 83% FL 3.2 cm 
19w4d (87a2n-17o7a) Hadl FL 39% HL 3.0 cm 19w6d (99h6i-54s5f) Logan HL 46% GA for
sonogram 21w2d (76j7y-28l8t) Fetal Weight Estimate: based on (BPD,AC) Hadlock 
Weight: 387 gm (331-444) Hadlock  : 0lbs, 13oz Cervical Length: 4.9 cm Fetal 
Heart Rate: 168 bpm -----------------------------------
----------------------------------- CLINICAL SUMMARY Type of Gestation: 
Juárez Intrauterine pregnancy in vertex presentation. Fetal size is 
appropriate for gestational age. Fetal motion and organsseen: Fetal heart motion
seen Fetal somatic activity observed Fetal body and limb movements seen Four 
chamber fetal heart observed Left ventricular outflow tract (LVOT) seen Right 
ventricular outflowtract (RVOT) seen Normal intracranial anatomy seen Umbilical 
cord insertion in fetus seen Fetal stomach, Renal Fossa, Bladder and Spine seen 
Three vessel umbilical cord noted Placental location: [facility] NAME: REYES,MARCI Radiology Department PHYS: Namita Garcia MD
[address] : 1980 AGE: 39 SEX: F [place], [place] [address] ACCT NO: 
[id number] LOC: NATALIERAD PHONE #: [phone] EXAM DATE: 10/07/2019 STATUS: REG 
CLI FAX #: [phone] RAD NO: Page 1 Signed Report (CONTINUED) Patient Name: 
REYES,MARCI  Unit No: [id number] EXAMS: CPT CODE: [id number] US PREGAFTER 1ST TRI
[address]342 (Continued)  Anterior Placental maturity : Grade 1 There is no evidence of
placenta previa. Amniotic fluid volume is normal. Uterus and adnexa: No 
significant abnormality is seen.Thank you for allowing us to participate in the 
care of this patient. Zana Guzmán M.D. ------------------ Electronic Signature
10/07/2019 10:50am ** Electronically Signed by Zana Guzmán MD on 10/07/2019 at 
1050 ** Reported and signed by: Zana Guzmán MD CC: Namita Andrea MD  
Technologist: Janet Gaona RDMS Probe: Trnscrbd D/T: 10/07/2019 (1050) 
[email or web address] Orig Print D/T: S: 10/07/2019 (1050) [facility] 
NAME: REYES,MARCI Radiology Department  PHYS: [facility]. - Namita Andrea MD [address] : 1980 AGE: 39 SEX: F [place] [address] ACCT NO: [id number]
LOC: NATALIERAD  PHONE #: [phone] EXAM DATE: 10/07/2019 STATUS: REG CLI FAX #: 
[phone] RAD NO: Page 2 Signed Report  Patient Name: REYES,MARCI Unit No: 
[id number] EXAMS: CPT CODE: [id number] US PREGAFTER 1ST TRI  [address] (Continued) 
[facility] NAME: REYES,MARCI  Radiology Department PHYS: 
[facility] [address] : 1980 AGE: 39 SEX: F [place] [address]  ACCT NO: [id number] LOC: NATALIERAD PHONE #: [phone] EXAM DATE: 
10/07/2019 STATUS: REG CLI FAX#: [phone] RAD NO: Page 3  Signed Report- US 
PREG EVAL 1ST [id number] 15:04:00Patient Name: REYES,MARCI Unit No: 
[id number] EXAMS: CPT CODE: [id number] US PREG EVAL 1ST TRIMTR  [address] [place] [address] [place], [place] [address] OBSTETRICAL ULTRASOUND REPORT
---------------------------------------------------------------------- Pat. 
Name: REYES, MARCI Pat. No: [id number] Study Date: 2019 1:53pm , Age: 
1980, 39 Pregnancies:  4, Para 3 LMP: 2019 GA by LMP: 13w1d 
GA by US: 13w2d GA Selected: 13w1d (LMP) FREDRICK: 2020 Referring MD: NAMITA ANDREA Sonographer: Aleida Mendoza RDMS, RVT CPT4: [id number] Admitting MD: 
NAMITA ANDREA/Ind: SCAN 1 VIABILITY 
---------------------------------------------------------------------- ME
ASUREMENTS FETAL AGE FETAL GROWTH EVALUATION Measurement GA Range Srce %for GA 
Ratios ----------- ----- ------------- ---- ------- ------------------------- 
CRL 7.4 cm 13w4d (86q2f-41j6j) Hadl CRL 67% FL 1.1 cm 13w1d (59x5e-03l6a) Hadl 
FL 50% GA for sonogram 13w2d (96o1e-04e9f) based on (CRL,FL) Avg Cervical 
Length: 3.7 cm Fetal Heart Rate: 162 bpm 
---------------------------------------------------------------------- MATERNAL 
ANATOMY ---------------------------------------------------------------------- 
Ovaries LxHxW (cm) Right 3.7 x 1.9 x 2.3 Vol: 8.5cc Left 4.4 x 2.4 x 2.1 Vol: 
11.6cc ---------------------------------------------------------------------- 
---------------------------------------------------------------------- CLINICAL 
SUMMARY Type of Gestation: Juárez Intrauterine pregnancy in variable 
presentation. Fetal motion and organs seen: Fetal heart motion seen Placental 
location: Forming Amniotic fluid volume is normal. Uterus and adnexa: Left 
ovarian CLC 30 x 18 x 20mm Cristine Christensen M.D. -------------------- Electronic 
Signature 2019 03:04pm [facility] NAME: REYES,MARCI 
Radiology Department PHYS: [id number] - Namita Andrea MD [address]  : 
1980 AGE: 39 SEX: F [place], [place] [address] ACCT NO: [id number] LOC: NATALIERAD 
PHONE #: [phone] EXAM DATE: 2019 STATUS: PRE CLI FAX #: [phone] 
RAD NO: Page 1 Signed Report (CONTINUED) Patient Name: REYES,MARCI Unit No: 
[id number] EXAMS: CPT CODE: [id number] US PREG EVAL 1ST [id number] (Continued) 
** Electronically Signed by Cristine Christensen MD on 2019 at 1504 ** Reported and
signed by: Cristine Christensen MD  CC: Namita Andrea MD Technologist: Aleida Mendoza RDMS, RVT Probe: Trnscrbd D/T: 2019 (1504) t.SDR.NMG Orig Print D/T: S: 
2019 (1503) [facility] NAME: REYES,MARCI  Radiology 
Department PHYS: [facility] Namita Valentine MD [address] : 1980 AGE: 
39 SEX: F [place] [address] ACCT NO: [id number] LOC: NATALIERAD PHONE #: 
[phone] EXAM DATE: 2019 STATUS: PRE CLI FAX #: [phone] RAD NO: 
Page 2 Signed Report Patient Name: REYES,MARCI Unit No: [id number] EXAMS: CPT 
CODE: [id number] US PREG EVAL 1ST TRIMTR [address]2982 (Continued)[facility] NAME: REYES,MARCI Radiology Department PHYS: [facility] Nam[facility] Andrea MD 
[address] : 1980 AGE: 39 SEX: F  [place] [address] ACCT NO: 
[id number] LOC: NATALIERAD PHONE #: [phone] EXAM DATE: 2019 STATUS: PRE 
CLI FAX #: [phone] RAD NO:  Page 3 Signed Report- US PREG UT TRANSVAGINAL
2019 15:04:00Patient Name: REYES,MARCI Unit No: [id number] EXAMS: CPT 
CODE: [id number] US PREG UT TRANSVAGINAL [address] [facility] [address] 
[place], [place] [address] OBSTETRICAL ULTRASOUND REPORT --------
-------------------------------------------------------------- Pat. Name: REYES,
MARCI Pat. No: [id number] Study Date: 2019 1:53pm , Age: 1980, 39
Pregnancies:  4, Para 3 LMP: 2019 GA by LMP: 13w1d GA by US: 13w2d 
GA Selected: 13w1d (LMP) FREDRICK: 2020 Referring MD: Namita Andrea 
Sonographer: Aleida Mendoza RDMS, RVT CPT4: [id number] Hist/Ind: SCAN 1 
VIABILITY ----------------------------------------------------------------------
MEASUREMENTS FETAL AGE FETAL GROWTH EVALUATION Measurement GA Range Srce %for GA
Ratios ----------- ----- ------------- ---- -------------------------------- CRL
7.4 cm 13w4d (05y3t-25t1j) Hadl CRL 67% FL 1.1 cm 13w1d (41t9g-31p1e)Hadl FL 50%
GA for sonogram 13w2d (63o2s-63o7t) based on (CRL,FL) Avg Cervical Length: 3.7 
cm FetalHeart Rate: 162 bpm 
---------------------------------------------------------------------- MATERNAL
ANATOMY ---------------------------------------------------------------------- 
Ovaries LxHxW (cm) Right 3.7 x 1.9 x 2.3 Vol: 8.5cc Left 4.4 x 2.4 x 2.1 Vol: 
11.6cc ---------------------------------------------------------------------- 
---------------------------------------------------------------------- CLINICAL 
SUMMARY Type of Gestation: Juárez Intrauterine pregnancy in variable 
presentation. Fetal motion and organs seen: Fetal heart motion seen Placental 
location: Forming Amniotic fluid volume is normal. Uterus and adnexa: Left 
ovarian CLC 30 x 18 x 20mm Cristine Christensen M.D. -------------------- Electronic 
Signature 2019 03:04pm [facility] NAME: REYES,MARCI [facility]
iology Department PHYS: BREE.01 - Namita Andrea MD [address]  : 
1980 AGE: 39 SEX: F [place], [place] [address] ACCT NO: [id number] LOC: MARGIE 
PHONE #: [phone] EXAM DATE: 2019 STATUS: PRE CLI FAX #: [phone] 
RAD NO: Page 1 Signed Report (CONTINUED) Patient Name: REYES,MARCI Unit No: 
[id number] EXAMS: CPT CODE: [id number] [facility] TRANSVAGINAL [address] (Continued) 
** Electronically Signed by Cristine Christensen MD on 2019 at 1502 ** Reported and
signed by: Cristine Christensen MD  CC: Namita Andrea MD Technologist: Aleida Mendoza RDMS, RVT Probe: [id number] Trnscrbd D/T: 2019 (1507) Mitesh Orig Print 
D/T: S: 2019 (1940) [facility] NAME: REYES,MARCI  
Radiology Department PHYS: [facility] Namita Andrea MD [address] : 
1980 AGE: 39 SEX: F [place] [address] ACCT NO: [id number] LOC: NATALIERAD 
PHONE #: [phone] EXAM DATE: 2019 STATUS: PRE CLI FAX #: [phone] 
RAD NO: Page 2  Signed Report Patient Name: REYES,MARCI Unit No:[id number] 
EXAMS: CPT CODE: [id number]  PREG UT TRANSVAGINAL [address]362 (Continued) [facility] NAME: REYES,MARCI Radiology Department PHYS: AMMY Callahan 
Namita Andrea MD [address] : 1980 AGE: 39 SEX: F [place] [address] ACCT NO: [id number] LOC: NATALIERAD PHONE #: [phone] EXAM DATE: 
2019 STATUS: PRE CLI FAX #: [phone] RAD NO: Page 3 Signed Report



Notes





                          Date/Time    Note         Provider     Source

 

                                        2024 08:51:00 7701[address]67 [place] [address]





PATIENT NAME: REYES,MARCI KAYE ADMIT DATE: 

24

ACCOUNT NO: [id number] ROOM NO: [facility]

MEDICAL RECORD NO: [id number] AGE: 44

SEX: F



ADMITTING PHYSICIAN: Abril Wood MD

ATTENDING PHYSICIAN: Abril Wood MD





OPERATION DATE: 2024



PREOPERATIVE DIAGNOSES:

1. Menometrorrhagia.

2. Enlarged uterus.

3. Endometrial polyp.

4. Pelvic pain.



POSTOPERATIVE DIAGNOSES:

1. Menometrorrhagia.

2. Enlarged uterus.

3. Endometrial polyp.

4. Pelvic pain.

5. Endometriosis.



PROCEDURES:

1. Robotically assisted total laparoscopic 

hysterectomy, bilateral

salpingectomy.

2. Robotically assisted laparoscopic excision of 

endometriosis.

3. Robotically assisted laparoscopic lysis of 

adhesions.



SURGEON: Abril Wood MD



ASSISTANT: Mitul Esquivel



ANESTHESIA: General.



ESTIMATED BLOOD LOSS: 100 mL



INTRAVENOUS FLUIDS: 1200 mL



URINE OUTPUT: 500 mL of clear urine.



COMPLICATIONS: None.



COUNTS: Correct.



FINDINGS: A 12-week size uterus with bladder 

adherent to the mid and lower

uterine segment, evidence of bilateral tubal 

ligation, normal ovaries

bilaterally. Endometrial implants on the left 

pelvic sidewall, posterior

cul-de-sac and right pelvic sidewall, colon 

adhered to left abdominal sidewall.

Omental adhesion to the anterior abdominal wall. 

Normal-appearing upper

abdomen.



PATIENT NAME: REYES,MARCI KAYE ACCOUNT #: 

[id number]









INDICATIONS: The patient is a 43-year-old  

4, para 4, who presented with

menometrorrhagia. The patient was found on 

ultrasound to have enlarged uterus

as well as a possible endometrial polyp. The 

patient also had chronic pelvic

pain and was noted to have endometriosis. Risks, 

benefits and alternatives were

discussed with the patient. The patient agreed to 

above-named procedures.



PROCEDURE IN DETAIL: The patient was taken to the 

OR with IV in place. She was

induced under general anesthesia without 

difficulty. The patient was given

Ancef preoperatively for prophylaxis as well as 

Lovenox 40 mg subcutaneously for

DVT prophylaxis. The patient was placed in dorsal 

lithotomy position in

[facility]. She was prepped and draped in the usual 

sterile manner. Hussein

catheter was placed in bladder sterilely. 

Anterior lip of the cervix was

grasped with single tooth tenaculum. Cervix was 

gently dilated from size 9 to

size 15 using Marco Antonio dilators. Uterus was gently 

sounded to 12 cm. Stay sutures

were placed at 3 o'clock and 9 o'clock, a 12 cm 

DESTINEY uterine manipulator, 3.0

colpotomizer ring and vaginal occluder were then 

placed in the uterus, on the

cervix and in the vagina respectively. These were 

attached to stay sutures.

Vaginal occluder was filled with 60 mL of saline. 

The patient was then placed

flat. Attention was then turned to the abdomen. 

Due to prior  section

x4 with anticipation of possible intra-abdominal 

adhesions, entry was made in

Khnana's point. Approximately 3 fingerbreadths 

below the costal margin along the

left midclavicular line, 8 mm transverse incision 

was made using the AirSeal

port and 5 mm 0-degree scope direct entry into 

the abdominal cavity was

accomplished easily. CO2 gas was then used to 

insufflate the abdomen with

pressure limit set at 15 mmHg. The patient was 

then placed in Trendelenburg.

Survey of the abdomen revealed an omental 

adhesions below the umbilicus in the

midline. Uterus was noted to be enlarged. Upper 

abdomen was within normal

limits. Umbilical port site trocar was placed 

under direct visualization as

well as 2 ancillary ports, one in right lower 

quadrant, one in the left lower

quadrant, all 8 mm ports. Laparoscope was 

removed. Robot was docked.

Fenestrated bipolar instrument was placed in the 

left lower quadrant. Monopolar

instrument was placed in right lower quadrant. 

Laparoscope was placed in the

umbilical port site. Omental adhesions to 

anterior abdominal wall was carefully

lysed. At that point, colon was noted to be 

adhered to left abdominal sidewall.

These adhesions were carefully lysed with extreme 

care to avoid injuring colon.

At that point, the left adnexal area was more 

easily visualized. The patient

was pretreated with IV Benadryl. ICG dye was 

injected intravenously. Pelvis

was then thoroughly inspected and endometrial 

implants were then noted along the

left pelvic sidewall and the posterior cul-de-sac 

and along the right pelvic

sidewall. No lesions were noted anteriorly; 

however bladder was noted to be

adhered to the mid and lower uterine segment. 

Attention was first turned to the

left pelvic sidewall. Ureter was identified. 

Incision was made in the

peritoneum. Hydrodissection was performed. 

Endometrial implants were then

carefully circumscribed and excised adjacent in 

the area of the left ovarian

fossa. Another cluster of lesions were 

circumscribed and excised along the left

pelvic sidewall adjacent to the left uterosacral 

ligament. Attention was then

turned to lesions in the posterior cul-de-sac. 

Lesion in the mid posterior

cul-de-sac was carefully circumscribed and 

excised with extreme care to avoid

injuring rectum. Another cluster of lesions was 

circumscribed and excised in

the right posterior cul-de-sac. Attention was 

then turned to the right pelvic

sidewall. Again, ureter was identified. Incision 

was made in the peritoneum.

Hydrodissection was performed. The lesions along 

the right pelvic sidewall

carefully circumscribed and excised. At that 

point, no other obvious lesions

were noted. Attention was then turned to the left 

adnexal area. Left



PATIENT NAME: REYES,MARCI KAYE ACCOUNT #: 

[id number]







uteroovarian ligament was cauterized using 

bipolar instrument then divided using

monopolar scissors. Left round ligament was 

divided in similar fashion.

Bladder was then back filled. Anterior leaflet 

was incised and dense adhesions

of the bladder to the anterior uterus were 

carefully lysed with extreme care to

avoid injuring the bladder. Bladder flap was then 

developed in the midline.

Uterine vessels were then skeletonized on the 

left side by incising posterior

peritoneum. Uterine vessels were then cauterized 

at the level of the internal

cervical os. Attention was then turned to the 

right side. Right uteroovarian

ligament was cauterized using bipolar instrument 

then divided using monopolar

scissors. Right round ligament was divided in 

similar fashion. Anterior leaf

of the peritoneum was incised and carried out 

medially to meet the other side.

Bladder was additionally dissected off lower 

uterine segment on the right side.

Uterine vessels were skeletonized on the right 

side by incising posterior

peritoneum. Uterine vessels were then cauterized 

at the level of the internal

cervical os. After assuring that the bladders 

were dissected off the lower

uterine segment, colpotomy was performed at the 

cervicovaginal junction

anteriorly from 10 o'clock to 2 o'clock. Uterine 

vessels were then again

encountered on the right side, were cauterized 

using bipolar instrument then

divided using monopolar scissors. Colpotomy was 

continued posteriorly over to

the left side. Left uterine vessels were 

cauterized using bipolar instrument

then divided using monopolar scissors. Colpotomy 

was completed. Uterus was

then brought partially into the vagina, left in 

place to maintain

pneumoperitoneum. Attention was then turned to 

the left adnexa. Left fallopian

tube was elevated and a note was noted to be 

adhered to the ovary. Adhesions

were carefully lysed. Mesosalpinx was then 

incised to excise the tube. Tube

was removed through the assistant port. Same was 

performed on the right side.

At that point, the uterus was removed through as 

0 V-Loc suture was introduced

into the pelvis. Sterile sponge and glove was 

placed in the vagina to maintain

pneumoperitoneum. Vaginal cuff was then closed in 

2 layers, first to

reapproximate the vaginal mucosa from right apex 

over to the left apex. Same

suture was used to reapproximate the endopelvic 

fascia from left apex over to

right apex. Suture was then ran back towards the 

midline. Pelvis was then

thoroughly irrigated and checked under low 

pressure. Good hemostasis was noted.

Good peristalsis was noted bilaterally. Due to 

extensive dissection, Vistaseal

was sprayed in the pelvis over the vaginal cuff 

as well as bilateral vascular

pedicles and posterior cul-de-sac. Good 

hemostasis was confirmed. All

instruments were then removed. Robot was 

undocked. Pneumoperitoneum was

evacuated. Trocars removed. Skin was closed using 

4-0 Vicryl subcuticular

stitch in all port sites and sealed with 

Dermabond. A 0.25% Marcaine was

injected in all port sites. Hussein catheter was 

removed. Speculum exam of

vagina revealed that the cuff was intact and 

hemostatic. No vaginal lacerations

were noted. The patient was removed from dorsal 

lithotomy position, awoken from

general anesthesia without difficulty. The 

patient tolerated the procedure

well.



Throughout the case, Mitul Esquivel acted as 

first assistant. He provided

protection and retraction. He performed suction 

for visualization. He

participated in the positioning of the patient as 

well as the closure of the

wounds. Without his assistance, the surgery could 

not have been performed

safely or to adequate accepted medical standards. 

Therefore his assistance was

considered medically indicated and necessary.



Dictated By: Abril Wood MD



Date Dictated: 2024 08:51:24



PATIENT NAME: REYES,MARCI KAYE ACCOUNT #: 

[id number]







Date Transcribed: 2024 09:37:34

NNT/ENRIQUE

Job #: [id number]

Receipt ID: [id number]



Authenticated and Edited by Abril Wood MD 

On 3/31/24 8:23:19 PM











Electronically Signed by Abril Wood MD on 

24 at 0824

























































































PATIENT NAME: REYES,MARCI KAYE ACCOUNT #: 

[id number]                                        [facility]

 

                                        2024 08:31:00 

[facility] ([facility])

Gynecology Post Prog Note

REPORT#:[id number] REPORT STATUS: Signed

REPORT INITIALIZATION DATE:24 TIME: 831



PATIENT: REYES,MARCI KAYE UNIT #: [id number]

ACCOUNT#: [id number] ROOM/BED: 2662-A

: [birth year] AGE: 43 SEX: F  ATTEND: Abril Wood MD

ADM DT: 24 AUTHOR: bAril Wood MD

REPT SERVICE DT/TIME: 24 0831

* ALL edits or amendments must be made on the 

electronic/computer document *





General

Post-op: day 1

Status post:

RATLHBS, EOE, REKHA



Subjective

Comments:

Pt doing well. Jeffry reg diet. Voiding well. Pain 

controlled.



Objective



General

VS/I O:

Last Documented:

Result Date Time

Pulse Ox 96  0446

B/P 130/83  0446

B/P Mean 98.9  0446

O2 Delivery Room air  044

Temp 98.4  044

Pulse 98  044

Resp 17  044

O2 Flow Rate 10  1415



24 hour I O ending at 0700:

 0700  1900

Intake Total 500.00 2090.00

Output Total 400 700

Balance 100.00 1390.00



Intake, .00 1650.00

Intake, Oral 440

Output, 100

Estimated

Blood Loss

Output, Urine 400 600

Patient 99.1 kg

Weight

Weight  Standing scale

Measurement

Method



PATIENT WEIGHT:



Weight (lb): 218

Weight (oz): 7.65

Weight (kg): 99.100





Physical Exam

General appearance: alert, awake

Wound/incision:

Location:

abd

Site condition: edges approximated, incision 

intact

Abdomen: normal bowel sounds, soft, no distention



Results

Findings/Data:

Laboratory Tests



0400

Hematology

WBC (6.5 - 12.3 K/mm3) 13.4 H

RBC (3.51 - 4.69 M/mm3) 3.72

Hgb (10.1 - 13.8 g/dL) 9.9 L

Hct (32.5 - 41.8 %) 31.5 L

MCV (84.6 - 96.6 fL) 84.7

MCH (27.3 - 33.9 pg) 26.6 L

MCHC (32.0 - 34.2 gm/dL) 31.4 L

RDW (12.2 - 16.3 %) 13.3

Plt Count (134 - 363 K/mm3) 333

MPV (9.2 - 12.7 fL) 10.1

Neut % (Auto) (57.9 - 77.3 %) 78.2 H

Lymph % (Auto) (14.5 - 29.7 %) 14.4 L

Mono % (Auto) (3.6 - 10.2 %) 6.9

Eos % (Auto) (0.0 - 3.0 %) 0.0

Baso % (Auto) (0.1 - 0.9 %)  0.3

Neut # (Auto) (K/mm3) 10.5

Lymph # (Auto) (K/mm3) 1.9

Mono # (Auto) (K/mm3)  0.9

Eos # (Auto) (K/mm3) 0

Baso # (Auto) (K/mm3) 0.0









Diagnosis, Assessment Plan

Free Text A P:

Doing well. HD stable. Good U/O and GI fxn.



Plan: Cont reg diet

PO pain meds

D/C today

Precautions given





Electronically Signed by Abril Wood MD on 

24 at 0833



RPT #:[id number]

***END OF REPORT***



                                                    [facility]

 

                                        2024-03-15 16:14:00 [id number]  [place] [address]





PATIENT NAME: REYES,MARCI KAYE  ADMIT DATE:

ACCOUNT NO: [id number] ROOM NO:

MEDICAL RECORD NO: [id number] AGE: 43

SEX: F



ADMITTING PHYSICIAN:

ATTENDING PHYSICIAN: Abril Wood MD





Order:

[id number]

Test Reason : PRE- OP (2024)

Test Date/Time Stamp:

Fri Mar 15 2024 16:14:03

Blood Pressure : ***/*** mmHG

Vent. Rate : 099 BPM Atrial Rate : 099 BPM

P-R Int : 162 ms QRS Dur : 088 ms

QT Int : 354 ms P-R-T Axes : 040 -06 019 degrees

QTc Int : 454 ms



Normal sinus rhythm

Normal ECG

No previous ECGs available

Confirmed by MADDY KEITA MD ([id number]) on 

3/18/2024 6:07:33 PM



Referred By: DOES_NOT KNOW Confirmed by:MADDY KEITA MD











Electronically Signed by Maddy Keita MD on 

24 at 1801











































PATIENT NAME: REYES,MARCI KAYE  ACCOUNT #: 

[id number]                                        [facility]

 

                                        2020 12:03:00 [id number] [place] [address]





PATIENT NAME: REYES,MARCI ADMIT DATE: 20

ACCOUNT NO: [id number] ROOM NO: 

MEDICAL RECORD NO: [id number] AGE: 39

SEX: F



ADMITTING PHYSICIAN: Namita Andrea MD

ATTENDING PHYSICIAN: Namita Andrea MD





ADMISSION DATE: 2020

DISCHARGE DATE: 2020



ADMITTING DIAGNOSES: G4, P3 at 37 and 1/7th 

weeks; chronic hypertension;

superimposed preeclampsia; prior low transverse 

 section x3;

multiparity, desires permanent sterilization; Rh 

negative; iron deficiency

anemia; left ovarian cyst; and urinary tract 

infection.



DISCHARGE DIAGNOSES: G4, P3 at 37 and 1/7th 

weeks; chronic hypertension;

superimposed preeclampsia; prior low transverse 

 section x3;

multiparity, desires permanent sterilization; Rh 

negative; iron deficiency

anemia; left ovarian cyst; urinary tract 

infection; repeat low transverse

 section; and bilateral tubal ligation.



DISCHARGE MEDICATIONS: Norco 5, Motrin, prenatal 

vitamins, and labetalol 100 mg

p.o. b.i.d.



DISCHARGE INSTRUCTIONS: Pelvic rest, no heavy 

lifting, wound and fever

precautions, postpartum blues and depression 

precautions, and preeclampsia

precautions. Follow up with Dr. Andrea in 6 

weeks. Blood pressure checks at

home twice a day. Call for blood pressure over 

160/100 or symptoms.



HOSPITAL COURSE: Ms. Reyes was admitted on the 

 from the clinic at

which point she was having blood pressures in the 

150s over 110 at 37 and 1/7th

weeks. She had previously been scheduled for a 

repeat  on the  and underwent her  section and tubal 

ligation at the time of

admission. Per Dr. Curry's report, there was no 

left ovarian cyst present. Her

surgery was uncomplicated. On postoperative day 

#1, she was doing well with

adequate pain control and adequate blood pressure 

control on her labetalol. Her

hemoglobin and hematocrit preoperatively was 12.1 

and 38.7 and on postoperative

day #1, was 11.6 and 35.9. On postoperative day 

#2, her diet was advanced. Her

epidural was removed. She was started on oral 

pain medication. On

postoperative day #3, she was discharged home 

with the above noted prescriptions

and precautions. She was also given Macrodantin 

100 mg b.i.d. to continue for a

total of 7 days. This was cleared with the 

pediatrician.



Dictated By: Namita Andrea MD



WT: DS:MEL/BREE.KAYLEN

DD: 2020 12:03:56

DT: 2020 12:15:37

Conf#: [id number]/DID#: [id number]





PATIENT NAME: REYES,MARCI ACCOUNT #: [id number]









Authenticated by Namita Andrea MD On 2020 

12:47:03 PM











Electronically Signed by Namita Andrea MD on 

20 at 1247

































































































PATIENT NAME: REYES,MARCI ACCOUNT #: [id number]                           [facility]

 

                                        2020 08:36:00 

[facility] ([facility])

OB Postpart Progr Note

REPORT#:[id number] REPORT STATUS: Signed

DATE:20 TIME: 08



PATIENT: REYES,MARCI UNIT #: [id number]

ACCOUNT#: [id number] ROOM/BED: 2036-A

: 82 AGE: 39 SEX: F ATTEND: Namita Andrea MD

ADM DT: 20 AUTHOR: Namita Andrea MD



* ALL edits or amendments must be made on the 

electronic/computer document *





Subjective



Subjective

Comments:

No complaints. Wants to go home. Eating. Voiding. 

Ambulating. Good pain control.

No headaches. DW patient circ care



Objective



Nursing Documentation Review

Nursing data:

The data set between the solid lines has been 

imported from nursing

documentation. Any exceptions have been noted 

below under Provider comments.

_________________________________________________

_______________________________



Feeding preference:

_________________________________________________

_______________________________



Provider comments on imported nursing data: []

_____________________________________________





General

VS:

Vital Signs

Date Temp Pulse Resp B/P B/P Mean Pulse Ox FiO2

/- 97.7-99.1  18 113-122/67-80 

90.0



Last Documented:

Result Date Time

B/P 118/73 358

Temp 98.5  0358

Pulse 88  0358

Resp 18  0358

B/P Mean 90.0  1610

Pulse Ox 92 / 2130



Patient Weight



Weight (lb): 248

Weight (oz):

Weight (kg): 112.491





Physical Exam

Incision site: well approximated edges, staples 

intact, dry, no drainage, no

inflammation

Fundus: firm, below the umbilicus, non-tender

Lower extremities:

Edema: none





Diagnosis, Assessment Plan



Diagnosis, Assessment Plan

Comments:

POD 3 Repeat LTCS/BTL, CHTN SI PE

Doing well

Continue labetolol

Rx NOrco 5, motrin

Continue macrodantin x 5 days

FU 6 weeks

Prec given



Electronically Signed by Namita Andrea MD on 

20 at 0838



RPT #:[id number]

***END OF REPORT***



                                                    [facility]

 

                                        2020 09:10:00 

[facility] ([facility])

OB Postpart Progr Note

REPORT#:[id number] REPORT STATUS: Signed

DATE:20 TIME: 0910



PATIENT: REYES,MARCI UNIT #: [id number]

ACCOUNT#: [id number] ROOM/BED: 2036-A

: 82 AGE: 39 SEX: F ATTEND: Namita Andrea MD

ADM DT: 20 AUTHOR: Namita Andrea MD



* ALL edits or amendments must be made on the 

electronic/computer document *





Subjective



Subjective

Comments:

When I walked in the room, patient states "I want 

to go home. I want my baby".

DW patient baby in NICU. She does not understand 

why. She is being communicated

with by Dr. Tiara Andrea and NICU staff but does 

not understand "saturation". I

dw patient reasons, ?s answered. Baby doing well 

and likely to be transferred to

general nursery today. She will want him 

circumcised.





Objective



Nursing Documentation Review

Nursing data:

The data set between the solid lines has been 

imported from nursing

documentation. Any exceptions have been noted 

below under Provider comments.

_________________________________________________

_______________________________



Feeding preference:

_________________________________________________

_______________________________



Provider comments on imported nursing data: []

_____________________________________________





General

VS:

Vital Signs

Date Temp Pulse Resp B/P  B/P Mean Pulse Ox FiO2

- 97.6-99.1  18-20 102-121/66-75 

85.7-90.5



Last Documented:

Result Date Time

B/P 113/72  033

Temp 98.7  0339

Pulse 92  0339

Resp 18  0339

B/P Mean 85.7 / 0339

Pulse Ox 92 / 2130



Patient Weight



Weight (lb): 248

Weight (oz):

Weight (kg): 112.491





Physical Exam

Incision site: well approximated edges, staples 

intact, dry, no drainage, no

inflammation

Lower extremities:

Edema: 1+ pitting



Result

Findings/data:

Laboratory Tests



1355

Chemistry

Uric Acid (2.6 - 6.0 mg/dL) 4.7

AST (15 - 37 units/L) 33

ALT (12 - 78 units/L) 24

Lactate Dehydrogenase (81 - 234 units/L) 198



Laboratory Tests



0437 1355

Hematology

WBC (6.6 - 12.1 K/mm3) 15.5 H 8.1

RBC (3.45 - 5.01 M/mm3) 4.01 4.32

Hgb (10.7 - 13.9 g/dL) 11.6 12.1

Hct (32.1 - 42.1 %) 35.9 38.7

MCV (84.1 - 94.8 fL) 90 90

MCH (27 - 35 pg) 28.9 28.0

MCHC (32.2 - 34.1 gm/dL) 32.3 31.3 L

RDW (12.4 - 16.5 %) 19.0 H 19.4 H

Plt Count (133 - 385 K/mm3) 199 196

MPV (9.1 - 12.7 fl)  11.0 11.5

Neut % (Auto) (56.5 - 79.4 %) 85.4 H 72.7

Lymph % (Auto) (14.3 - 34.3 %) 8.2 L 18.1

Mono % (Auto) (5.1 - 10.4 %) 5.6 7.1

Eos % (Auto) (0.1 - 3.0 %) 0.1 1.0

Baso % (Auto) (0.1 - 1.0 %) 0.2 0.4

Neut # (Auto) (K/mm3) 13.2 5.9

Lymph # (Auto) (K/mm3) 1.3 1.5

Mono # (Auto) (K/mm3) 0.9 0.6

Eos # (Auto) (K/mm3) 0.01 0.08

Baso # (Auto) (K/mm3) 0.0 0.0



Laboratory Tests



1355

Serology

Treponema pallidum Ab (NONREACTIVE) NONREACTIVE

Hep Bs Antigen (NONREACTIVE) NONREACTIVE

Hepatitis C Antibody (NONREACTIVE) NONREACTIVE

Hep C Ab Signal/Cutoff (<0.80) 0.15

HIV 1 2 Antibody (NONREACTIVE) NONREACTIVE



Laboratory Tests



1355 1355

Urines

Urine Protein (NEGATIVE) NEGATIVE

Urine Glucose (UA) (NEGATIVE)  NEGATIVE

Urine Ketones (NEGATIVE) 1+

Ur Random Creatinine (mg/dL) 78.8

U Random Total Protein (mg/dL)  33.0

Protein/Creatinin Ratio (<200 mg/gcrea) 410.0 H









Diagnosis, Assessment Plan



Diagnosis, Assessment Plan

Comments:

POD 2 Repeat LTCS/BTL for superimposed pre 

eclampsia on CHTN

Doing well

Circ on baby planned

BP stable

Likely home tomorrow

Will need rx for meds

Continue macrodantin for 7 days. Will confirm no 

bilirubin elevations with

pediatrician.



Electronically Signed by Namita Andrea MD on 

20 at 0914



RPT #:[id number]

***END OF REPORT***



                                                    [facility]

 

                                        2020 08:52:00 

[facility] ([facility])

OB Postpart Progr Note

REPORT#:[id number] REPORT STATUS: Signed

DATE:20 TIME: 852



PATIENT: REYES,MARCI UNIT #: [id number]

ACCOUNT#: [id number] ROOM/BED: [address]

: 82 AGE: 39 SEX: F ATTEND: Namita Andrea MD

ADM DT: 20 AUTHOR: Bisi Ascencio MD



* ALL edits or amendments must be made on the 

electronic/computer document *





Subjective



Subjective

EGA weeks/days: 37 weeks

Status/Day: post operative, d1

Patient reports:

Patient reports:

Yes normal lochia, Yes pain management effective, 

No no complaints



Objective



Nursing Documentation Review

Nursing Data:

The data set between the solid lines has been 

imported from nursing

documentation. Any exceptions have been noted 

below under Provider comments.

_________________________________________________

_______________________________



Feeding preference:

_________________________________________________

_______________________________



Provider comments on imported nursing data: []

_____________________________________________





General

VS:

Vital Signs:

Date Time Temp Pulse Resp B/P B/P Pulse O2 O2 

Flow FiO2

Mean Ox Delivery Rate

02/04 0334 98.9 101 20 104/64

02/04 0334 99.0 101 20 104/64 77.1

02/03  98.6 101 20 130/84

02/03 2322 98.6 101 20 130/84 99.3

02/03 2130  130/78

02/03 2130 93 14 92

02/03 2115 89 16 94

02/03 2100 94.0

02/03 2100 92 25 128/72 93

02/03 2030 91.0

02/03 2030 95

02/03 2030 91 16 125/68  93

02/03  92 18 94

02/03  18

02/03  89.0

02/03  90 23 129/67 94

02/03 1945 89.0

02/03 1945 83 16 123/69 94

02/03 1931 96.0

02/03 1931  125/83

02/03 1931 90 18 97

02/03 1930 95 25 94

02/03 1915 81.0

02/03 1915 86 27 118/60 95

02/03 1901 90.0

02/03 1901 98.4 90 17 131/68 99

02/03 1900 92 32  94

02/03 1845 90.0

02/03 1845 82 19 119/71 95

02/03 1830 88.0

02/03 1830 83 17 119/68 95

02/03 1815 80.0

02/03 1815 83 18 115/59 95

02/03 1807 78.0

02/03 1807 98.6 82 17 106/57 96

02/03 1539 115.0

02/03 1539 86 149/91

02/03 1456 119.0

02/03 1456 90 162/93

02/03 1439 118.0

02/03 1439 92 158/92

02/03 1424 116.0

02/03 1424  84 154/93

02/03 1420 98.6 18

02/03 1324 107.0

02/03 1324 90 140/85



Patient Weight



Weight (lb): 248

Weight (oz):

Weight (kg): 112.491





Physical Exam

Abdomen: soft

Incision site: well approximated edges, staples 

intact, dry

Uterus: involution appropriate, non-tender

Fundus: below the umbilicus, non-tender

Lochia: normal



Result

Findings/Data:

Laboratory Tests:



0437 1355 1355

Chemistry

Uric Acid (2.6 - 6.0 mg/dL) 4.7

AST (15 - 37 units/L) 33

ALT (12 - 78 units/L) 24

Lactate Dehydrogenase (81 - 234 units/L) 198

Hematology

WBC (6.6 - 12.1 K/mm3) 15.5 H 8.1

RBC (3.45 - 5.01 M/mm3) 4.01  4.32

Hgb (10.7 - 13.9 g/dL) 11.6 12.1

Hct (32.1 - 42.1 %) 35.9 38.7

MCV (84.1 - 94.8 fL) 90 90

MCH (27 - 35 pg) 28.9 28.0

MCHC (32.2 - 34.1 gm/dL) 32.3 31.3 L

RDW (12.4 - 16.5 %) 19.0 H 19.4 H

Plt Count (133 - 385 K/mm3) 199 196

MPV (9.1 - 12.7 fl) 11.0 11.5

Neut % (Auto) (56.5 - 79.4 %) 85.4 H 72.7

Lymph % (Auto) (14.3 - 34.3 %)  8.2 L 18.1

Mono % (Auto) (5.1 - 10.4 %) 5.6 7.1

Eos % (Auto) (0.1 - 3.0 %) 0.1 1.0

Baso % (Auto) (0.1 - 1.0 %) 0.2 0.4

Neut # (Auto) (K/mm3) 13.2 5.9

Lymph # (Auto) (K/mm3)  1.3 1.5

Mono # (Auto) (K/mm3) 0.9 0.6

Eos # (Auto) (K/mm3) 0.01 0.08

Baso # (Auto) (K/mm3) 0.0 0.0

Serology

Treponema pallidum Ab (NONREACTIVE) NONREACTIVE

Hep Bs Antigen (NONREACTIVE) NONREACTIVE

Hepatitis C Antibody (NONREACTIVE) NONREACTIVE

Hep C Ab Signal/Cutoff (<0.80)  0.15

HIV 1 2 Antibody (NONREACTIVE) NONREACTIVE

Urines

Urine Protein (NEGATIVE) NEGATIVE

Urine Glucose (UA) (NEGATIVE) NEGATIVE

Urine Ketones (NEGATIVE) 1+

Ur Random Creatinine (mg/dL)  78.8

U Random Total Protein (mg/dL) 33.0

Protein/Creatinin Ratio (<200 mg/gcrea) 410.0 H







Diagnosis, Assessment Plan



Diagnosis, Assessment Plan

Problem List/A P:

1. UTI (urinary tract infection) in pregnancy in 

third trimester



Assessment: nml postpartum progress, chronic 

hypertension, pre-eclampsia

Plan: routine postpartum care

Plan discussed with: patient, spouse/partner



Electronically Signed by Bisi Ascencio MD on 

20 at 0854



RPT #:[id number]

***END OF REPORT***



                                                    [facility]

 

                                        2020 18:08:00 [id number] [facility]

[address] [place] [address]





PATIENT NAME: REYES,MARCI ADMIT DATE: 20

ACCOUNT NO: [id number] ROOM NO: 2036

MEDICAL RECORD NO: [id number] AGE: 39

SEX: F



ADMITTING PHYSICIAN: Namita Andrea MD

ATTENDING PHYSICIAN: Namita Andrea MD





OPERATION DATE: 2020



PREOPERATIVE DIAGNOSES: Intrauterine pregnancy at 

37-1/2 weeks with chronic

hypertension, superimposed pregnancy-induced 

hypertension with some severe

features, but no proteinuria, multiparity with 

desire for permanent

sterilization.



POSTOPERATIVE DIAGNOSES: Intrauterine pregnancy 

at 37-1/2 weeks with chronic

hypertension, superimposed pregnancy-induced 

hypertension with some severe

features, but no proteinuria, multiparity with 

desire for permanent

sterilization.



OPERATIONS AND PROCEDURES: The patient underwent 

a repeat  section,

Pfannenstiel skin incision, low cervical 

transverse uterine incision, and

bilateral tubal sterilization.



SURGEON: Bisi Ascencio MD



ASSISTANT: Aaliyah Mckeon SA



ANESTHESIA: Combined epidural spinal per Dr. Cerda.



FINDINGS: Delivered a viable vigorous male infant 

at 1719 from the vertex

position. Copious clear fluid. Weight was 3760, 

Apgars were 8 and 8 for

color.Resolution previous ovarian cyst



ESTIMATED BLOOD LOSS: 700 mL.



COMPLICATIONS: None.



DETAILS OF PROCEDURE: The patient was taken to 

the operating room where she

received her epidural anesthetic per Dr. Cerda. She was placed in a supine

position on the operating table with a wedge on 

the right hip and prepped and

draped in standard fashion for  section. 

Prepped and draped in standard

fashion for  section. A time-out 

procedure was performed. After

verification of adequate anesthesia, a 

Pfannenstiel skin incision was made 2

fingerbreadths above the symphysis pubis 

utilizing a skin knife. Incision was

taken down the fascia utilizing electrocautery 

unit with hemostasis achieved

using electrocautery unit.  The fascia was scored 

in the midline. Fascial

incision was extended bilaterally in a 

curvilinear fashion utilizing the Bryant

scissors. Fascia was removed from the underlying 

rectus muscle utilizing sharp

and blunt dissection, both superiorly and 

inferiorly. Rectus muscle was divided

in the midline. Peritoneum was entered bluntly 

and the incision was extended



PATIENT NAME: REYES,MARCI ACCOUNT #: [id number]







bluntly. Bladder blade was placed. Because of the 

patient's three previous

C-sections, an incision was made relatively high 

in the lower uterine segment.

The baby was encountered in the vertex position 

in clear fluid. The uterine

incision was extended bluntly. The baby was 

delivered utilizing a single blade

of a Brown's forceps as a lever after 

artificial rupture of membranes with a

clear fluid. Baby's head was delivered 

atraumatically. Nares and mouth were

suctioned. Cord was clamped and cut and the 

infant was shown to his parents and

handed to the nurse in attendance. Cord blood was 

obtained. Stem cells were

then collected for MD Cowart. Upon completion, 

the placenta was given to MD Cowart. The placenta was then manually 

extracted from the uterus. Uterus was

exteriorized and wrapped in a moist lap. The 

endometrial cavity was swabbed

utilizing a moist lap. Bladder blade was then 

placed. Uterine incision was

closed utilizing running suture of 0 Vicryl in a 

running-locking fashion.

Multiple figure-of-eight sutures were required 

for hemostasis. The area was

inspected for hemostasis, which was noted to be 

excellent. A moist lap was

placed across the lower uterine segment and the 

right tube was grasped utilizing

a Deon clamp. An avascular space was noted in 

the mesosalpinx. Incision was

made utilizing electrocautery. The 0 chromic was 

placed through the defect and

the tube was tied proximally and distally 

utilizing 0 chromic. Multiple ties

were made. The intervening segment of tube was 

resected and handed to the nurse

in attendance to be tagged as right tube. 

Procedure was repeated on the

opposite side. Uterus was then turned to the 

pelvic cavity, which was

irrigated and suctioned. A small amount of 

bleeding was noted from the

midpoint of the uterine incision and a 

figure-of-eight suture was placed with

excellent hemostasis. Both tubal sites were 

inspected for hemostasis, which

was found to be excellent. Peritoneum was grasped 

with 3 hemostats and closed

utilizing running suture of 2-0 Vicryl in a 

running fashion. Same suture was

used to loosely reapproximate the rectus muscle 

in the midline. Subfascial

area was irrigated, inspected, and hemostasis 

achieved using electrocautery

unit. Fascia was closed utilizing an 0 Vicryl 

suture ligature in a running

fashion. The [employer]'s [employer] Mary's were 

reapproximated utilizing 2-0 Vicryl

suture ligature. Skin was reapproximated 

utilizing skin staples and a dressing

was applied. Estimated blood loss was 700 mL. 

Mom, dad, and baby Eleno

tolerated the procedure well.



Dictated By: Bisi Ascencio MD



WT: OP:FDEBRA/WALE/KAYLEN

DD: 2020 18:08:36

DT: 2020 20:03:34

Conf#: [id number]/DID#: [id number]



Authenticated and Edited by Bisi Ascencio MD On 

20 8:55:41 AM











Electronically Signed by Bisi Ascencio MD on 

20 at 0858















PATIENT NAME: REYES,MARCI ACCOUNT #: [id number]                           [facility]

 

                                        2020 17:56:00 

[facility] ([facility])

OB Delivery Note

REPORT#:[id number] REPORT STATUS: Signed

DATE:20 TIME: 



PATIENT: REYES,MARCI UNIT #: [id number]

ACCOUNT#: [id number] ROOM/BED: [address]

: 82 AGE: 39 SEX: F ATTEND: Namita Andrea MD

ADM DT: 20 AUTHOR: Bisi Ascencio MD



* ALL edits or amendments must be made on the 

electronic/computer document *





OB Delivery



Pre-delivery

Meds prior to delivery: labetalol ancef

 evaluation at delivery: NRP certified 

personnel

Admission EGA (wks/days): 37 1/7 weeks

EGA at delivery (wks/days): 37 weeks



Steroids Prior to Delivery

Steroids prior to delivery: no, delivery on 

arrival



General

VS:

Last Documented:

Result Date Time

B/P Mean 115.0  1539

B/P 149/91  1539

Pulse 86  1539

Temp 98.6  1420

Resp 18   1420







Baby A Information

Baby A information

Delivery date: 20

Delivery time: 1719

Birth status: live born

Wt of baby (grams): 3760

Gender: male, [place]



 Delivery

 section

Abdominal incision: Pfannenstiel

Primary indication: previous , Chronic 

htn with superimposed

preeclampsia

Priority: indicated (add on)

Antibiotic prior to incision: 1 dose

)(SCDs applied activated: Yes

Incision: low transverse

Hemorrhage: no

Uterine scar: intact

Consent: indication discussed, questions 

answered, pt consent to op delivery

Mother's condition: mother stable

Infant's condition: infant stable in room

Additional comments:

Surgeon Silva Norton

Repeat c/s BTL



Comp none



Blood Loss/Details

Blood loss at delivery: <1000 ml

EBL (ml's): 700



Electronically Signed by Bisi Ascencio MD on 

20 at 1800



RPT #:[id number]

***END OF REPORT***



                                                    [facility]

 

                                        2020 12:43:00 

[facility] ([facility])

OB Admission / H P

REPORT#:[id number] REPORT STATUS: Signed

DATE:20 TIME: 1243



PATIENT: REYES,MARCI  UNIT #: [id number]

ACCOUNT#: [id number] ROOM/BED:

: 82 AGE: 39 SEX: F ATTEND: Namita Andrea MD

ADM DT: 20 AUTHOR: Namita Andrea MD



* ALL edits or amendments must be made on the 

electronic/computer document *





OB Admission H P Hx

Chief complaint: elevated blood pressure

HPI:

40 yo WF  @ 37 1/7 weeks who was seen in 

the office today in [place]

with worsening BP. She has a h/o Chronic HTN and 

had been on Labetolol 100 mg

BID the entire pregnancy without problems. She 

was seen recently in [facility] with

increasing BPs and later placed on modified 

bedrest at home on . Since then,

her BPs have been up to 167/109. Today in the 

office her BP is 150/90 and 152/

110. She also reports decreased FM today. There 

is no HA, visual changes, RUQ

pain. No VB/LOF.



PNC: Dr. Namita Andrea

1. HTN-on meds before pregnancy off and on. 

Started on Labetolol 100mg BID on  @ 10 weeks and has remained on same dose 

throughout pregnancy. Took baby ASA

until 36 weeks

2. Prior C/S x 3 and MPDPS-scheduled for repeat 

c/section and bilateral

salpingectomy.

3. RH negative-s/p Rhogam @ 29 1 weeks

4. Third trimester anemia-placed on iron 2x/day

5. 4 cm left ovarian cyst seen on early scan but 

adnexa not seen on f/u USG. To

check left adnexa at the time of scheduled 

surgery

6. E. coli UTI (100,000) dx'd  and has not 

been treated yet. Sensitive to

all abx tested. Culture done from [facility] at [facility]



PNLabs: GBS positive, O negative, RI, HIV 

negative, HB neg, NR, GC/CH/Trich

negative, NIPT normal boy, CF/SMA/FX negative, 

One hour @ 13 1/7 weeks 137 and @

28 weeks was 128. H/H at 28 weeks 10.0/304.



POB: T C Section, Largest was 8#15oz girl in 

2009. No complications



GYN: Regular cycles. No abnormal pap smears. No 

STDs

Pregnancy history:

: 4

Term: 3

: 0

Abortus: 0

Living children: 3

Complications (prev preg): none

Previous : low uterine trans incis

Number of prev : 3

Indication for prior : arrest 

dilatation/descent

Current pregnancy:

EDC: 20

Admission EGA (wks/days): 37 1/7 weeks

EGA based on: LMP

Conditions of pregnancy: anemia, HTN-chron 

w/pre-eclampsia, kidney/bladder

infection

Labs:

Blood type: See HPI above

Past medical history: migraines, obesity, UTI, 

Migraines;  HTN-dx'd .

Previously on losartan (with infrequent use); 

Vitamin D Deficiency; Obesity

Past surgical history:  Breast Implants; 

3/5/2002 c/s; 9/15/2006 c/s; 2009 c/s; 10/2018 Hysteroscopic removal of IUD; 

 Tonsillectomy; [place] [facility]

Social history: employed, , no alcohol 

use, no tobacco use, no drug use,

-Rick

Medications:

Home Medications:

IRON AG FUM/VIT C/FA/MV/CA THREONATE (FERREX 28) 

1 TAB PO DAILY

LABETALOL (TRANDATE) 100 MG PO BID

PNV WITH FE FUMARATE/FA (PRENATAL) 1 TAB PO DAILY





Allergies

Coded Allergies:

No Known Allergies (20)





Objective



General

VS:

249 lbs today in the office

BP in office 152/100, 150/90





Patient Weight



Weight (lb): 248

Weight (oz):

Weight (kg): 112.439679





Physical Exam

Abdomen: gravid, soft, no abnormal tenderness, no 

guarding, no rebound

tenderness

Pelvic exam:

Pelvis clinically adequate: cl/thick/high

Membranes:

Membranes: Intact

Lower extremities:

Edema: 1+ pitting



Diagnosis, Assessment Plan



Diagnosis, Assessment Plan

Problem List/A P:

1. UTI (urinary tract infection) in pregnancy in 

third trimester



Comments:

40 yo  at 37 1/7 weeks with prior c/s x 3 

and MPDPS.

CHTN with worsening BPs in the severe range.

Admit for deliery by Repeat LTCS/Bilateral 

Salpingectomy. I have dw Dr. Ascencio

Please check adnexa for left ovarian cyst seen on 

early USG (and adnexa not

visualized on fu scan).

Obesity

Having a boy and Dr. Andrea with do circ on 

Wednesday or Thursday

Pedi Dr. Tiara Andrea at [facility] then Dr. Stone

Breastfeeding planned

Continue labetolol 100 mg BID pp

Continue oral abx pp for UTI treatment

RH negative-s/p Rhogam on 

Flu shot done 11/10/19

TDAP done 19





Electronically Signed by Namita Andrea MD on 

20 at 1356



RPT #:[id number]

***END OF REPORT***



                                                    [facility]

 

                                        2020 16:45:00 [id number] THE [facility]

[address]

[place], [place] [address]





PATIENT NAME: REYES,MARCI ADMIT DATE: 20

ACCOUNT NO: [id number] ROOM NO:

MEDICAL RECORD NO: [id number]  AGE: 39

SEX: F



ADMITTING PHYSICIAN:

ATTENDING PHYSICIAN: Namita Andrea MD







TRIAGE EVALUATION: 2020



HISTORY OF PRESENT ILLNESS: The patient is a 

39-year-old G4, P3-0-0-3, with

last menstrual period in 2019, and estimated 

date of confinement 2020.

She presented at 35-5/7 weeks, complaining of 

increased blood pressures at work.

She states she noted that her fingers on her 

right hand had become numb and

purple. A coworker suggested she check her blood 

pressure. She was using the

cuff in the office, not the usual cuff she uses 

at home and her pressure was

155/105. [facility]; however, all

blood pressures have been within normal limits 

and less than 140/90. Her

highest was 123/72 at arrival. Dr. Andrea gave 

orders for labs to be drawn and

asked that I see the patient in the 30-minute 

window for [facility], was notified at

1:53 and patient evaluation began at 2:14 p.m. 

She denies headache, vision

changes, right upper quadrant pain, or other 

preeclampsia symptoms. She denies

fever, chills, diarrhea, constipation or dysuria. 

She does report some nausea

earlier prior to arrival but no vomiting. She 

denies vaginal bleeding or

leakage of fluid. She reports occasional 

contractions every 1 to 2 hours and

reports good fetal movement. She does have 

chronic hypertension and has been

taking her labetalol as instructed with good 

control of her pressures on home

monitoring and in clinic previously. Her prenatal 

care has been with Dr. Andrea. Her next visit is scheduled for 

2020. Her care began at

approximately 9 weeks' gestation in this 

pregnancy. She states other than the

chronic hypertension well controlled with 

labetalol, she has had no

complications during the pregnancy and all labs 

and ultrasounds have been

normal.



PAST MEDICAL HISTORY: Chronic hypertension 

diagnosed in , controlled with

losartan prior to the pregnancy, although she did 

stop it prior to conception

and since pregnancy, she has used labetalol.



PAST SURGICAL HISTORY:  section x3. Also, 

breast augmentation in 

and tonsils in  and wisdom teeth in .



ALLERGIES: NO KNOWN DRUG ALLERGIES.



MEDICATIONS: Prenatal vitamins, baby aspirin 

daily, iron 2 tablets at one time

daily and labetalol 100 mg twice daily.



OBSTETRICAL HISTORY: In , full term  

section for failure to



progress, delivered of a female infant weighing 8 

pounds 12 ounces. In ,

full-term  section without trial of 

labor, delivered of a female infant



PATIENT NAME: REYES,MARCI ACCOUNT #: [id number]







weighing 7 pounds 15 ounces. In , full term 

 section, without trial

of labor, delivered of a female infant weighing 8 

pounds 15 ounces. The patient

denies complications in any of the pregnancies.



GYNECOLOGIC HISTORY: Menarche at age 15 with 

regular monthly cycles lasting 7

days, moderate in amount with minimal cramping. 

She denies prior history of

STDs and reports all Pap smears normal.



SOCIAL HISTORY: The patient denies tobacco or 

illicit drug use. She reports

occasional prepregnancy alcohol use. She lives 

with her spouse, a 39-year-old

male with hypertension and her 3 daughters. She 

works as a District  for

[employer] in [place]. She states her job is 

stressful, working in the

[email or web address] for child support.



FAMILY HISTORY: Parents both with hypertension. 

Maternal grandmother with

history of breast cancer and the uterine cancer. 

Maternal grandfather with

heart disease. Paternal grandmother  with 

lung cancer, she was a

smoker, she also had hypertension. Paternal 

grandfather  with unknown

type of cancer, he was also a smoker. The patient 

has 1 sister living and

healthy and her 3 daughters, the youngest with 

asthma. Otherwise, the older two

are healthy. The patient reports her 's 

uncle has Down syndrome. He

does not think his grandmother was over 35 at the 

time of delivery. Otherwise,

no mental retardation or birth defects in either 

family. The patient did have

chromosomal testing earlier in the pregnancy and 

reports it was negative.



REVIEW OF SYSTEMS: A 10-point review of systems 

is negative except as stated

above.



PHYSICAL EXAMINATION:

GENERAL: The patient is a well-nourished, 

well-developed white female, in no

acute distress, lying on triage stretcher in 

[place].

VITAL SIGNS: Height 5 feet 1 inches, weight prior 

to the pregnancy 180 pounds

and at most recent visit to clinic 240 pounds, 

blood pressure 123/72, pulse 87,

respirations 18, and temperature 99.1.

HEAD AND NECK: Within normal limits without 

lymphadenopathy or thyromegaly.

CHEST: Clear to auscultation bilaterally with 

regular rate and rhythm.

BREASTS: Deferred.

ABDOMEN: Soft, nontender, gravid with a fundal 

height of 37 cm. No pelvic exam

was performed as the patient denied contractions 

and none were seen on ____.

EXTREMITIES: Show 1+ edema to mid calf. Bilateral 

patellar reflexes 2+.

NEUROLOGIC: Nonfocal. The patient is awake, 

alert, and oriented x3.



DIAGNOSTIC DATA: NST is category 1 with baseline 

fetal heart tones in the 130s

with multiple 15 x 15 accelerations, no decels, 

and no contractions noted.



LABORATORY DATA: Labs ordered by Dr. Andrea 

included white blood cell count

of 8.9, hemoglobin 11.8, hematocrit 37.5, and 

platelets 191,000. BUN 9,

creatinine 0.6, ALT 21, AST 21, uric acid 4.7, 

and . Urinalysis with 2+

ketones, positive for nitrites, otherwise 

negative with 3-5 rbc's, 6-10 wbc's,

few epithelial cells, and moderate amount of 

bacteria consistent with urinary

tract infection. Urine culture has been sent, but 

results will not be available

for approximately 48 hours.



ASSESSMENT AND PLAN: This is a 39-year-old G4, P3 

at 35-5/7 weeks with history

of chronic hypertension, normally well controlled 

on labetalol, but with



PATIENT NAME: REYES,MARCI ACCOUNT #: [id number]







reported elevated blood pressure at work earlier 

today. On workup in [place], all

blood pressures are normal and no evidence of 

superimposed preeclampsia on her

labs. The patient does report excess maternal 

weight gain. She is now noted

to have a urinary tract infection and ketonuria. 

She is discharged home in

stable condition with reassuring fetal status. 

She is encouraged to drink at

least 100 ounces of water daily and eat frequent 

small healthy meals. At work,

she should avoid sitting for prolonged periods of 

time and when she is sitting,

she should prop her feet and walk around the bed 

every few hours. She is given

a script for Macrobid for the urinary tract 

infection. She is to start her

script today and take one pill every 12 hours 

until finished. She is encouraged

to perform kick counts nightly. Instructions and 

handout are given. The above

information was discussed with Dr. Andrea and she 

agreed with the plan.



Dictated By: Amy Krause MD



WT: [id number]:[id number]/[email or web address]

DD: 2020 16:45:13

DT: 2020 17:32:06

Conf#: [id number]/DID#: [id number]

Authenticated and Edited by Amy Krause MD On 

20 7:58:29 AM











Electronically Signed by Amy Krause MD on 

20 at 0800





























































PATIENT NAME: REYES,MARCI ACCOUNT #: [id number]                           [facility]